# Patient Record
Sex: FEMALE | Race: WHITE | NOT HISPANIC OR LATINO
[De-identification: names, ages, dates, MRNs, and addresses within clinical notes are randomized per-mention and may not be internally consistent; named-entity substitution may affect disease eponyms.]

---

## 2017-09-07 ENCOUNTER — APPOINTMENT (OUTPATIENT)
Dept: PULMONOLOGY | Facility: CLINIC | Age: 82
End: 2017-09-07

## 2017-09-15 ENCOUNTER — EMERGENCY (EMERGENCY)
Facility: HOSPITAL | Age: 82
LOS: 1 days | Discharge: HOME | End: 2017-09-15
Attending: EMERGENCY MEDICINE | Admitting: EMERGENCY MEDICINE
Payer: MEDICARE

## 2017-09-15 VITALS
SYSTOLIC BLOOD PRESSURE: 151 MMHG | DIASTOLIC BLOOD PRESSURE: 73 MMHG | OXYGEN SATURATION: 100 % | HEART RATE: 59 BPM | RESPIRATION RATE: 16 BRPM

## 2017-09-15 VITALS
TEMPERATURE: 98 F | HEART RATE: 78 BPM | DIASTOLIC BLOOD PRESSURE: 80 MMHG | WEIGHT: 128.53 LBS | OXYGEN SATURATION: 99 % | SYSTOLIC BLOOD PRESSURE: 114 MMHG | RESPIRATION RATE: 20 BRPM
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
## 2017-09-15 DIAGNOSIS — M25.511 PAIN IN RIGHT SHOULDER: ICD-10-CM

## 2017-09-15 DIAGNOSIS — S40.021A CONTUSION OF RIGHT UPPER ARM, INITIAL ENCOUNTER: ICD-10-CM
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
## 2017-09-15 DIAGNOSIS — I10 ESSENTIAL (PRIMARY) HYPERTENSION: ICD-10-CM

## 2017-09-15 DIAGNOSIS — N39.0 URINARY TRACT INFECTION, SITE NOT SPECIFIED: ICD-10-CM

## 2017-09-15 DIAGNOSIS — E78.5 HYPERLIPIDEMIA, UNSPECIFIED: ICD-10-CM

## 2017-09-15 LAB
ALBUMIN SERPL ELPH-MCNC: 4.3 G/DL — SIGNIFICANT CHANGE UP (ref 3.3–5)
ALP SERPL-CCNC: 74 U/L — SIGNIFICANT CHANGE UP (ref 40–120)
ALT FLD-CCNC: 15 U/L — SIGNIFICANT CHANGE UP (ref 10–45)
ANION GAP SERPL CALC-SCNC: 13 MMOL/L — SIGNIFICANT CHANGE UP (ref 5–17)
APPEARANCE UR: CLEAR — SIGNIFICANT CHANGE UP
APTT BLD: 30.5 SEC — SIGNIFICANT CHANGE UP (ref 27.5–37.4)
AST SERPL-CCNC: 23 U/L — SIGNIFICANT CHANGE UP (ref 10–40)
BACTERIA # UR AUTO: PRESENT /HPF
BASOPHILS NFR BLD AUTO: 1.5 % — SIGNIFICANT CHANGE UP (ref 0–2)
BILIRUB SERPL-MCNC: 0.4 MG/DL — SIGNIFICANT CHANGE UP (ref 0.2–1.2)
BILIRUB UR-MCNC: NEGATIVE — SIGNIFICANT CHANGE UP
BUN SERPL-MCNC: 24 MG/DL — HIGH (ref 7–23)
CALCIUM SERPL-MCNC: 9.4 MG/DL — SIGNIFICANT CHANGE UP (ref 8.4–10.5)
CHLORIDE SERPL-SCNC: 95 MMOL/L — LOW (ref 96–108)
CK MB CFR SERPL CALC: 1.7 NG/ML — SIGNIFICANT CHANGE UP (ref 0–6.7)
CK SERPL-CCNC: 64 U/L — SIGNIFICANT CHANGE UP (ref 25–170)
CO2 SERPL-SCNC: 28 MMOL/L — SIGNIFICANT CHANGE UP (ref 22–31)
COLOR SPEC: YELLOW — SIGNIFICANT CHANGE UP
CREAT SERPL-MCNC: 0.97 MG/DL — SIGNIFICANT CHANGE UP (ref 0.5–1.3)
DIFF PNL FLD: NEGATIVE — SIGNIFICANT CHANGE UP
EOSINOPHIL NFR BLD AUTO: 5 % — SIGNIFICANT CHANGE UP (ref 0–6)
EPI CELLS # UR: SIGNIFICANT CHANGE UP /HPF
GLUCOSE SERPL-MCNC: 106 MG/DL — HIGH (ref 70–99)
GLUCOSE UR QL: NEGATIVE — SIGNIFICANT CHANGE UP
HCT VFR BLD CALC: 32.9 % — LOW (ref 34.5–45)
HGB BLD-MCNC: 11.2 G/DL — LOW (ref 11.5–15.5)
INR BLD: 1.08 — SIGNIFICANT CHANGE UP (ref 0.88–1.16)
KETONES UR-MCNC: NEGATIVE — SIGNIFICANT CHANGE UP
LEUKOCYTE ESTERASE UR-ACNC: (no result)
LYMPHOCYTES # BLD AUTO: 27.7 % — SIGNIFICANT CHANGE UP (ref 13–44)
MCHC RBC-ENTMCNC: 31.2 PG — SIGNIFICANT CHANGE UP (ref 27–34)
MCHC RBC-ENTMCNC: 34 G/DL — SIGNIFICANT CHANGE UP (ref 32–36)
MCV RBC AUTO: 91.6 FL — SIGNIFICANT CHANGE UP (ref 80–100)
MONOCYTES NFR BLD AUTO: 11.1 % — SIGNIFICANT CHANGE UP (ref 2–14)
NEUTROPHILS NFR BLD AUTO: 54.7 % — SIGNIFICANT CHANGE UP (ref 43–77)
NITRITE UR-MCNC: NEGATIVE — SIGNIFICANT CHANGE UP
PH UR: 6.5 — SIGNIFICANT CHANGE UP (ref 5–8)
PLATELET # BLD AUTO: 247 K/UL — SIGNIFICANT CHANGE UP (ref 150–400)
POTASSIUM SERPL-MCNC: 3.7 MMOL/L — SIGNIFICANT CHANGE UP (ref 3.5–5.3)
POTASSIUM SERPL-SCNC: 3.7 MMOL/L — SIGNIFICANT CHANGE UP (ref 3.5–5.3)
PROT SERPL-MCNC: 7.5 G/DL — SIGNIFICANT CHANGE UP (ref 6–8.3)
PROT UR-MCNC: NEGATIVE MG/DL — SIGNIFICANT CHANGE UP
PROTHROM AB SERPL-ACNC: 12 SEC — SIGNIFICANT CHANGE UP (ref 9.8–12.7)
RBC # BLD: 3.59 M/UL — LOW (ref 3.8–5.2)
RBC # FLD: 14 % — SIGNIFICANT CHANGE UP (ref 10.3–16.9)
RBC CASTS # UR COMP ASSIST: < 5 /HPF — SIGNIFICANT CHANGE UP
SODIUM SERPL-SCNC: 136 MMOL/L — SIGNIFICANT CHANGE UP (ref 135–145)
SP GR SPEC: <=1.005 — SIGNIFICANT CHANGE UP (ref 1–1.03)
TROPONIN T SERPL-MCNC: <0.01 NG/ML — SIGNIFICANT CHANGE UP (ref 0–0.01)
UROBILINOGEN FLD QL: 0.2 E.U./DL — SIGNIFICANT CHANGE UP
WBC # BLD: 4 K/UL — SIGNIFICANT CHANGE UP (ref 3.8–10.5)
WBC # FLD AUTO: 4 K/UL — SIGNIFICANT CHANGE UP (ref 3.8–10.5)
WBC UR QL: (no result) /HPF

## 2017-09-15 PROCEDURE — 80053 COMPREHEN METABOLIC PANEL: CPT

## 2017-09-15 PROCEDURE — 85025 COMPLETE CBC W/AUTO DIFF WBC: CPT

## 2017-09-15 PROCEDURE — 81001 URINALYSIS AUTO W/SCOPE: CPT

## 2017-09-15 PROCEDURE — 93005 ELECTROCARDIOGRAM TRACING: CPT

## 2017-09-15 PROCEDURE — 70450 CT HEAD/BRAIN W/O DYE: CPT | Mod: 26

## 2017-09-15 PROCEDURE — 99285 EMERGENCY DEPT VISIT HI MDM: CPT | Mod: 25

## 2017-09-15 PROCEDURE — 84484 ASSAY OF TROPONIN QUANT: CPT

## 2017-09-15 PROCEDURE — 85610 PROTHROMBIN TIME: CPT

## 2017-09-15 PROCEDURE — 99284 EMERGENCY DEPT VISIT MOD MDM: CPT | Mod: 25

## 2017-09-15 PROCEDURE — 85730 THROMBOPLASTIN TIME PARTIAL: CPT

## 2017-09-15 PROCEDURE — 82553 CREATINE MB FRACTION: CPT

## 2017-09-15 PROCEDURE — 93010 ELECTROCARDIOGRAM REPORT: CPT

## 2017-09-15 PROCEDURE — 82550 ASSAY OF CK (CPK): CPT

## 2017-09-15 PROCEDURE — 70450 CT HEAD/BRAIN W/O DYE: CPT

## 2017-09-15 PROCEDURE — 73030 X-RAY EXAM OF SHOULDER: CPT | Mod: 26,RT

## 2017-09-15 PROCEDURE — 73030 X-RAY EXAM OF SHOULDER: CPT

## 2017-09-15 RX ORDER — CEPHALEXIN 500 MG
1 CAPSULE ORAL
Qty: 14 | Refills: 0 | OUTPATIENT
Start: 2017-09-15 | End: 2017-09-22

## 2017-09-15 NOTE — ED ADULT NURSE NOTE - OBJECTIVE STATEMENT
fell last Saturday and hurt right upper arm ; family states baseline MS - oriented to name and birthday, not year; knows location and family, not date/time went to [place] and sent here for possible stroke from 3 days ago when family states her walking was unsteady - no slurred speech; no pronator drify or weakness; follows commands; converses easily - denies other symptoms

## 2017-09-15 NOTE — ED PROVIDER NOTE - PMH
Cerebral artery occlusion with cerebral infarction  CVA (cerebral infarction)  Essential hypertension  HTN (hypertension)  Fall  Falls  Hyperlipidemia  HLD (hyperlipidemia)  Memory loss  Memory loss

## 2017-09-15 NOTE — ED ADULT NURSE REASSESSMENT NOTE - COMFORT CARE
ambulated to bathroom/repositioned/warm blanket provided/warm packs to PRAVEENA/side rails up/wait time explained

## 2017-09-15 NOTE — ED PROVIDER NOTE - MEDICAL DECISION MAKING DETAILS
arm injury.  suspect occult fall.  xray without fracture.  ct head without acute process.  has 24 hour care per family.  labs wnl.  to f/u with pmd on monday for reassessment.  prn tylenol

## 2017-09-15 NOTE — ED ADULT NURSE NOTE - CHIEF COMPLAINT QUOTE
Patient was brought in for change in mental status  for 3 days , unsteady gait/slow gait since yesterday . Also s/p fall last week at the store , since yesterday c/o rt arm pain . Denies any loc . Was sent from Dr. meyer for r/o CVA . No facial droop nor slurred speech noted .

## 2017-09-15 NOTE — ED ADULT TRIAGE NOTE - CHIEF COMPLAINT QUOTE
Patient was brought in for change in mental status  for 3 days , unsteady gait/slow gait since yesterday . Also s/p fall last week at the store , since yesterday c/o rt arm pain . Denies any loc . Was sent from Dr. meyer for r/o CVA . Patient was brought in for change in mental status  for 3 days , unsteady gait/slow gait since yesterday . Also s/p fall last week at the store , since yesterday c/o rt arm pain . Denies any loc . Was sent from Dr. meyer for r/o CVA . No facial droop nor slurred speech noted .

## 2017-09-15 NOTE — ED PROVIDER NOTE - CARE PLAN
Principal Discharge DX:	Arm contusion, right, initial encounter Principal Discharge DX:	Arm contusion, right, initial encounter  Secondary Diagnosis:	UTI (urinary tract infection)

## 2017-09-15 NOTE — ED PROVIDER NOTE - OBJECTIVE STATEMENT
here with pain in right shoulder for past few days.  Per family, slid off a stool at the store 6 d ago but didn't hit arm.  Pain in arm started a few days after that.  Denies additional fall but patient with memory issues.  Saw pmd today and referred to ED.  No headache, neck pain, fever/chills.

## 2017-11-12 ENCOUNTER — TRANSCRIPTION ENCOUNTER (OUTPATIENT)
Age: 82
End: 2017-11-12

## 2017-12-11 ENCOUNTER — TRANSCRIPTION ENCOUNTER (OUTPATIENT)
Age: 82
End: 2017-12-11

## 2018-01-15 ENCOUNTER — INPATIENT (INPATIENT)
Facility: HOSPITAL | Age: 83
LOS: 1 days | Discharge: HOME | DRG: 312 | End: 2018-01-17
Attending: INTERNAL MEDICINE | Admitting: INTERNAL MEDICINE
Payer: MEDICARE

## 2018-01-15 VITALS
WEIGHT: 164.91 LBS | HEART RATE: 60 BPM | TEMPERATURE: 98 F | SYSTOLIC BLOOD PRESSURE: 122 MMHG | DIASTOLIC BLOOD PRESSURE: 78 MMHG | OXYGEN SATURATION: 97 % | RESPIRATION RATE: 16 BRPM

## 2018-01-15 LAB
ALBUMIN SERPL ELPH-MCNC: 3.7 G/DL — SIGNIFICANT CHANGE UP (ref 3.3–5)
ALP SERPL-CCNC: 67 U/L — SIGNIFICANT CHANGE UP (ref 40–120)
ALT FLD-CCNC: 17 U/L — SIGNIFICANT CHANGE UP (ref 10–45)
ANION GAP SERPL CALC-SCNC: 13 MMOL/L — SIGNIFICANT CHANGE UP (ref 5–17)
APPEARANCE UR: CLEAR — SIGNIFICANT CHANGE UP
APTT BLD: 28 SEC — SIGNIFICANT CHANGE UP (ref 27.5–37.4)
AST SERPL-CCNC: 23 U/L — SIGNIFICANT CHANGE UP (ref 10–40)
BASOPHILS NFR BLD AUTO: 1.2 % — SIGNIFICANT CHANGE UP (ref 0–2)
BILIRUB SERPL-MCNC: 0.2 MG/DL — SIGNIFICANT CHANGE UP (ref 0.2–1.2)
BILIRUB UR-MCNC: NEGATIVE — SIGNIFICANT CHANGE UP
BUN SERPL-MCNC: 22 MG/DL — SIGNIFICANT CHANGE UP (ref 7–23)
CALCIUM SERPL-MCNC: 8.9 MG/DL — SIGNIFICANT CHANGE UP (ref 8.4–10.5)
CHLORIDE SERPL-SCNC: 95 MMOL/L — LOW (ref 96–108)
CK MB CFR SERPL CALC: 1.5 NG/ML — SIGNIFICANT CHANGE UP (ref 0–6.7)
CK SERPL-CCNC: 50 U/L — SIGNIFICANT CHANGE UP (ref 25–170)
CO2 SERPL-SCNC: 26 MMOL/L — SIGNIFICANT CHANGE UP (ref 22–31)
COLOR SPEC: YELLOW — SIGNIFICANT CHANGE UP
CREAT SERPL-MCNC: 1.14 MG/DL — SIGNIFICANT CHANGE UP (ref 0.5–1.3)
DIFF PNL FLD: NEGATIVE — SIGNIFICANT CHANGE UP
EOSINOPHIL NFR BLD AUTO: 7.2 % — HIGH (ref 0–6)
GLUCOSE SERPL-MCNC: 188 MG/DL — HIGH (ref 70–99)
GLUCOSE UR QL: NEGATIVE — SIGNIFICANT CHANGE UP
HCT VFR BLD CALC: 32.2 % — LOW (ref 34.5–45)
HGB BLD-MCNC: 10.7 G/DL — LOW (ref 11.5–15.5)
INR BLD: 1.05 — SIGNIFICANT CHANGE UP (ref 0.88–1.16)
KETONES UR-MCNC: (no result) MG/DL
LEUKOCYTE ESTERASE UR-ACNC: (no result)
LYMPHOCYTES # BLD AUTO: 25.3 % — SIGNIFICANT CHANGE UP (ref 13–44)
MCHC RBC-ENTMCNC: 30.6 PG — SIGNIFICANT CHANGE UP (ref 27–34)
MCHC RBC-ENTMCNC: 33.2 G/DL — SIGNIFICANT CHANGE UP (ref 32–36)
MCV RBC AUTO: 92 FL — SIGNIFICANT CHANGE UP (ref 80–100)
MONOCYTES NFR BLD AUTO: 9.8 % — SIGNIFICANT CHANGE UP (ref 2–14)
NEUTROPHILS NFR BLD AUTO: 56.5 % — SIGNIFICANT CHANGE UP (ref 43–77)
NITRITE UR-MCNC: NEGATIVE — SIGNIFICANT CHANGE UP
PH UR: 6 — SIGNIFICANT CHANGE UP (ref 5–8)
PLATELET # BLD AUTO: 321 K/UL — SIGNIFICANT CHANGE UP (ref 150–400)
POTASSIUM SERPL-MCNC: 3.7 MMOL/L — SIGNIFICANT CHANGE UP (ref 3.5–5.3)
POTASSIUM SERPL-SCNC: 3.7 MMOL/L — SIGNIFICANT CHANGE UP (ref 3.5–5.3)
PROT SERPL-MCNC: 7 G/DL — SIGNIFICANT CHANGE UP (ref 6–8.3)
PROT UR-MCNC: NEGATIVE MG/DL — SIGNIFICANT CHANGE UP
PROTHROM AB SERPL-ACNC: 11.7 SEC — SIGNIFICANT CHANGE UP (ref 9.8–12.7)
RBC # BLD: 3.5 M/UL — LOW (ref 3.8–5.2)
RBC # FLD: 14.2 % — SIGNIFICANT CHANGE UP (ref 10.3–16.9)
SODIUM SERPL-SCNC: 134 MMOL/L — LOW (ref 135–145)
SP GR SPEC: 1.01 — SIGNIFICANT CHANGE UP (ref 1–1.03)
TROPONIN T SERPL-MCNC: <0.01 NG/ML — SIGNIFICANT CHANGE UP (ref 0–0.01)
UROBILINOGEN FLD QL: 0.2 E.U./DL — SIGNIFICANT CHANGE UP
WBC # BLD: 5.7 K/UL — SIGNIFICANT CHANGE UP (ref 3.8–10.5)
WBC # FLD AUTO: 5.7 K/UL — SIGNIFICANT CHANGE UP (ref 3.8–10.5)

## 2018-01-15 PROCEDURE — 93010 ELECTROCARDIOGRAM REPORT: CPT

## 2018-01-15 PROCEDURE — 70450 CT HEAD/BRAIN W/O DYE: CPT | Mod: 26

## 2018-01-15 PROCEDURE — 99223 1ST HOSP IP/OBS HIGH 75: CPT | Mod: AI

## 2018-01-15 PROCEDURE — 99285 EMERGENCY DEPT VISIT HI MDM: CPT | Mod: 25

## 2018-01-15 PROCEDURE — 71045 X-RAY EXAM CHEST 1 VIEW: CPT | Mod: 26

## 2018-01-15 RX ORDER — SODIUM CHLORIDE 9 MG/ML
1000 INJECTION INTRAMUSCULAR; INTRAVENOUS; SUBCUTANEOUS
Qty: 0 | Refills: 0 | Status: DISCONTINUED | OUTPATIENT
Start: 2018-01-15 | End: 2018-01-16

## 2018-01-15 RX ADMIN — SODIUM CHLORIDE 125 MILLILITER(S): 9 INJECTION INTRAMUSCULAR; INTRAVENOUS; SUBCUTANEOUS at 21:53

## 2018-01-15 NOTE — ED ADULT TRIAGE NOTE - CHIEF COMPLAINT QUOTE
per family member  almost passed out while having dinner tonight, was feeling shaky , with headache; finger stick by  per family member  almost passed out while having dinner tonight, was feeling shaky , with headache; finger stick by ; in triage pt states "Im fine"

## 2018-01-15 NOTE — ED ADULT NURSE NOTE - CHIEF COMPLAINT QUOTE
per family member  almost passed out while having dinner tonight, was feeling shaky , with headache; finger stick by ; in triage pt states "Im fine"

## 2018-01-15 NOTE — ED PROVIDER NOTE - OBJECTIVE STATEMENT
89 yo F with hx of PAF -no AC- recent admission in [place] for UTI/ pyelo x 4 days - [hosp was 2 weeks ago]-  she flew back today from [place] was feeling fine --was at a restaurant  with family having dinner suddenly slumped over- was shaking for a few seconds during event - awoke qucikly  no tongue bite or urinary or fecal incontinence  no antecedant chest pain or sob  no N/V no diaphoresis  no cough or fevers or chills- no current dysuria or freq   no headache  no prior hx of CVA or TIA or MI  no cardiac stents  no DM 89 yo F with hx of PAF -no AC- recent admission in [place] for UTI/ pyelo x 4 days - [hosp was 2 weeks ago]-  she flew back today from [place] was feeling fine --was at a restaurant  with family having dinner suddenly slumped over- was shaking for a few seconds during event - awoke quickly  no tongue bite or urinary or fecal incontinence  no antecedant chest pain or sob  no N/V no diaphoresis  no cough or fevers or chills- no current dysuria or freq   no headache  no prior hx of CVA or TIA or MI  no cardiac stents  no DM

## 2018-01-15 NOTE — ED ADULT NURSE NOTE - OBJECTIVE STATEMENT
as per pt family, pt had near syncopal episode while at dinner tonight.  denies fall.  pt is a&ox2 with dementia baseline, does not recall events.  denies pain at this moment. denies headache, dizziness, chest pain, palpitations, n/v.   ekg completed at bedside.

## 2018-01-15 NOTE — ED PROVIDER NOTE - MEDICAL DECISION MAKING DETAILS
89 yo F with syncopal event 1 hr ago  neuro intact  no head trauma  hx of PAF  no AC  currently in SR  eb  56  no ischemic changes

## 2018-01-16 DIAGNOSIS — I10 ESSENTIAL (PRIMARY) HYPERTENSION: ICD-10-CM

## 2018-01-16 DIAGNOSIS — N39.0 URINARY TRACT INFECTION, SITE NOT SPECIFIED: ICD-10-CM

## 2018-01-16 DIAGNOSIS — I35.0 NONRHEUMATIC AORTIC (VALVE) STENOSIS: ICD-10-CM

## 2018-01-16 DIAGNOSIS — F03.90 UNSPECIFIED DEMENTIA WITHOUT BEHAVIORAL DISTURBANCE: ICD-10-CM

## 2018-01-16 DIAGNOSIS — J32.9 CHRONIC SINUSITIS, UNSPECIFIED: ICD-10-CM

## 2018-01-16 DIAGNOSIS — E78.5 HYPERLIPIDEMIA, UNSPECIFIED: ICD-10-CM

## 2018-01-16 DIAGNOSIS — R55 SYNCOPE AND COLLAPSE: ICD-10-CM

## 2018-01-16 DIAGNOSIS — I48.91 UNSPECIFIED ATRIAL FIBRILLATION: ICD-10-CM

## 2018-01-16 DIAGNOSIS — I48.0 PAROXYSMAL ATRIAL FIBRILLATION: ICD-10-CM

## 2018-01-16 LAB
ALBUMIN SERPL ELPH-MCNC: 3.6 G/DL — SIGNIFICANT CHANGE UP (ref 3.3–5)
ALP SERPL-CCNC: 64 U/L — SIGNIFICANT CHANGE UP (ref 40–120)
ALT FLD-CCNC: 17 U/L — SIGNIFICANT CHANGE UP (ref 10–45)
ANION GAP SERPL CALC-SCNC: 9 MMOL/L — SIGNIFICANT CHANGE UP (ref 5–17)
APPEARANCE UR: CLEAR — SIGNIFICANT CHANGE UP
AST SERPL-CCNC: 22 U/L — SIGNIFICANT CHANGE UP (ref 10–40)
BACTERIA # UR AUTO: PRESENT /HPF
BASOPHILS NFR BLD AUTO: 1.1 % — SIGNIFICANT CHANGE UP (ref 0–2)
BILIRUB SERPL-MCNC: 0.3 MG/DL — SIGNIFICANT CHANGE UP (ref 0.2–1.2)
BILIRUB UR-MCNC: NEGATIVE — SIGNIFICANT CHANGE UP
BUN SERPL-MCNC: 19 MG/DL — SIGNIFICANT CHANGE UP (ref 7–23)
CALCIUM SERPL-MCNC: 9.2 MG/DL — SIGNIFICANT CHANGE UP (ref 8.4–10.5)
CHLORIDE SERPL-SCNC: 98 MMOL/L — SIGNIFICANT CHANGE UP (ref 96–108)
CHOLEST SERPL-MCNC: 176 MG/DL — SIGNIFICANT CHANGE UP (ref 10–199)
CK MB CFR SERPL CALC: 1.6 NG/ML — SIGNIFICANT CHANGE UP (ref 0–6.7)
CK SERPL-CCNC: 46 U/L — SIGNIFICANT CHANGE UP (ref 25–170)
CO2 SERPL-SCNC: 28 MMOL/L — SIGNIFICANT CHANGE UP (ref 22–31)
COLOR SPEC: YELLOW — SIGNIFICANT CHANGE UP
COMMENT - URINE: SIGNIFICANT CHANGE UP
CREAT SERPL-MCNC: 0.97 MG/DL — SIGNIFICANT CHANGE UP (ref 0.5–1.3)
DIFF PNL FLD: NEGATIVE — SIGNIFICANT CHANGE UP
EOSINOPHIL NFR BLD AUTO: 6.1 % — HIGH (ref 0–6)
EPI CELLS # UR: (no result) /HPF (ref 0–5)
GLUCOSE SERPL-MCNC: 111 MG/DL — HIGH (ref 70–99)
GLUCOSE UR QL: NEGATIVE — SIGNIFICANT CHANGE UP
HBA1C BLD-MCNC: 5.3 % — SIGNIFICANT CHANGE UP (ref 4–5.6)
HCT VFR BLD CALC: 30.1 % — LOW (ref 34.5–45)
HDLC SERPL-MCNC: 90 MG/DL — SIGNIFICANT CHANGE UP (ref 40–125)
HGB BLD-MCNC: 10.1 G/DL — LOW (ref 11.5–15.5)
KETONES UR-MCNC: NEGATIVE — SIGNIFICANT CHANGE UP
LEUKOCYTE ESTERASE UR-ACNC: (no result)
LIPID PNL WITH DIRECT LDL SERPL: 75 MG/DL — SIGNIFICANT CHANGE UP
LYMPHOCYTES # BLD AUTO: 27.8 % — SIGNIFICANT CHANGE UP (ref 13–44)
MAGNESIUM SERPL-MCNC: 2.1 MG/DL — SIGNIFICANT CHANGE UP (ref 1.6–2.6)
MCHC RBC-ENTMCNC: 30.3 PG — SIGNIFICANT CHANGE UP (ref 27–34)
MCHC RBC-ENTMCNC: 33.6 G/DL — SIGNIFICANT CHANGE UP (ref 32–36)
MCV RBC AUTO: 90.4 FL — SIGNIFICANT CHANGE UP (ref 80–100)
MONOCYTES NFR BLD AUTO: 9.4 % — SIGNIFICANT CHANGE UP (ref 2–14)
NEUTROPHILS NFR BLD AUTO: 55.6 % — SIGNIFICANT CHANGE UP (ref 43–77)
NITRITE UR-MCNC: NEGATIVE — SIGNIFICANT CHANGE UP
NT-PROBNP SERPL-SCNC: 291 PG/ML — SIGNIFICANT CHANGE UP (ref 0–300)
PH UR: 6.5 — SIGNIFICANT CHANGE UP (ref 5–8)
PLATELET # BLD AUTO: 294 K/UL — SIGNIFICANT CHANGE UP (ref 150–400)
POTASSIUM SERPL-MCNC: 3.6 MMOL/L — SIGNIFICANT CHANGE UP (ref 3.5–5.3)
POTASSIUM SERPL-SCNC: 3.6 MMOL/L — SIGNIFICANT CHANGE UP (ref 3.5–5.3)
PROT SERPL-MCNC: 6.8 G/DL — SIGNIFICANT CHANGE UP (ref 6–8.3)
PROT UR-MCNC: NEGATIVE MG/DL — SIGNIFICANT CHANGE UP
RBC # BLD: 3.33 M/UL — LOW (ref 3.8–5.2)
RBC # FLD: 14.2 % — SIGNIFICANT CHANGE UP (ref 10.3–16.9)
RBC CASTS # UR COMP ASSIST: < 5 /HPF — SIGNIFICANT CHANGE UP
SODIUM SERPL-SCNC: 135 MMOL/L — SIGNIFICANT CHANGE UP (ref 135–145)
SP GR SPEC: <=1.005 — SIGNIFICANT CHANGE UP (ref 1–1.03)
TOTAL CHOLESTEROL/HDL RATIO MEASUREMENT: 2 RATIO — LOW (ref 3.3–7.1)
TRIGL SERPL-MCNC: 55 MG/DL — SIGNIFICANT CHANGE UP (ref 10–149)
TROPONIN T SERPL-MCNC: <0.01 NG/ML — SIGNIFICANT CHANGE UP (ref 0–0.01)
TROPONIN T SERPL-MCNC: <0.01 NG/ML — SIGNIFICANT CHANGE UP (ref 0–0.01)
TSH SERPL-MCNC: 1.36 UIU/ML — SIGNIFICANT CHANGE UP (ref 0.35–4.94)
UROBILINOGEN FLD QL: 0.2 E.U./DL — SIGNIFICANT CHANGE UP
WBC # BLD: 5.4 K/UL — SIGNIFICANT CHANGE UP (ref 3.8–10.5)
WBC # FLD AUTO: 5.4 K/UL — SIGNIFICANT CHANGE UP (ref 3.8–10.5)
WBC UR QL: > 10 /HPF

## 2018-01-16 PROCEDURE — 93880 EXTRACRANIAL BILAT STUDY: CPT | Mod: 26

## 2018-01-16 PROCEDURE — 93306 TTE W/DOPPLER COMPLETE: CPT | Mod: 26

## 2018-01-16 PROCEDURE — 99232 SBSQ HOSP IP/OBS MODERATE 35: CPT

## 2018-01-16 PROCEDURE — 93010 ELECTROCARDIOGRAM REPORT: CPT

## 2018-01-16 RX ORDER — LOSARTAN POTASSIUM 100 MG/1
100 TABLET, FILM COATED ORAL DAILY
Qty: 0 | Refills: 0 | Status: DISCONTINUED | OUTPATIENT
Start: 2018-01-16 | End: 2018-01-17

## 2018-01-16 RX ORDER — ASPIRIN/CALCIUM CARB/MAGNESIUM 324 MG
81 TABLET ORAL DAILY
Qty: 0 | Refills: 0 | Status: DISCONTINUED | OUTPATIENT
Start: 2018-01-16 | End: 2018-01-17

## 2018-01-16 RX ORDER — CHOLECALCIFEROL (VITAMIN D3) 125 MCG
1000 CAPSULE ORAL DAILY
Qty: 0 | Refills: 0 | Status: DISCONTINUED | OUTPATIENT
Start: 2018-01-16 | End: 2018-01-17

## 2018-01-16 RX ORDER — HEPARIN SODIUM 5000 [USP'U]/ML
5000 INJECTION INTRAVENOUS; SUBCUTANEOUS EVERY 8 HOURS
Qty: 0 | Refills: 0 | Status: DISCONTINUED | OUTPATIENT
Start: 2018-01-16 | End: 2018-01-17

## 2018-01-16 RX ORDER — ATORVASTATIN CALCIUM 80 MG/1
10 TABLET, FILM COATED ORAL AT BEDTIME
Qty: 0 | Refills: 0 | Status: DISCONTINUED | OUTPATIENT
Start: 2018-01-16 | End: 2018-01-17

## 2018-01-16 RX ORDER — MEMANTINE HYDROCHLORIDE 10 MG/1
5 TABLET ORAL
Qty: 0 | Refills: 0 | Status: DISCONTINUED | OUTPATIENT
Start: 2018-01-16 | End: 2018-01-17

## 2018-01-16 RX ORDER — HYDROCHLOROTHIAZIDE 25 MG
25 TABLET ORAL DAILY
Qty: 0 | Refills: 0 | Status: DISCONTINUED | OUTPATIENT
Start: 2018-01-16 | End: 2018-01-17

## 2018-01-16 RX ORDER — SODIUM CHLORIDE 9 MG/ML
1000 INJECTION INTRAMUSCULAR; INTRAVENOUS; SUBCUTANEOUS
Qty: 0 | Refills: 0 | Status: DISCONTINUED | OUTPATIENT
Start: 2018-01-16 | End: 2018-01-17

## 2018-01-16 RX ORDER — POTASSIUM CHLORIDE 20 MEQ
40 PACKET (EA) ORAL ONCE
Qty: 0 | Refills: 0 | Status: COMPLETED | OUTPATIENT
Start: 2018-01-16 | End: 2018-01-16

## 2018-01-16 RX ORDER — PANTOPRAZOLE SODIUM 20 MG/1
40 TABLET, DELAYED RELEASE ORAL
Qty: 0 | Refills: 0 | Status: DISCONTINUED | OUTPATIENT
Start: 2018-01-16 | End: 2018-01-17

## 2018-01-16 RX ADMIN — MEMANTINE HYDROCHLORIDE 5 MILLIGRAM(S): 10 TABLET ORAL at 17:30

## 2018-01-16 RX ADMIN — SODIUM CHLORIDE 60 MILLILITER(S): 9 INJECTION INTRAMUSCULAR; INTRAVENOUS; SUBCUTANEOUS at 19:38

## 2018-01-16 RX ADMIN — LOSARTAN POTASSIUM 100 MILLIGRAM(S): 100 TABLET, FILM COATED ORAL at 06:40

## 2018-01-16 RX ADMIN — HEPARIN SODIUM 5000 UNIT(S): 5000 INJECTION INTRAVENOUS; SUBCUTANEOUS at 22:02

## 2018-01-16 RX ADMIN — Medication 25 MILLIGRAM(S): at 07:51

## 2018-01-16 RX ADMIN — MEMANTINE HYDROCHLORIDE 5 MILLIGRAM(S): 10 TABLET ORAL at 06:40

## 2018-01-16 RX ADMIN — Medication 1000 UNIT(S): at 14:56

## 2018-01-16 RX ADMIN — Medication 81 MILLIGRAM(S): at 11:36

## 2018-01-16 RX ADMIN — HEPARIN SODIUM 5000 UNIT(S): 5000 INJECTION INTRAVENOUS; SUBCUTANEOUS at 06:49

## 2018-01-16 RX ADMIN — Medication 40 MILLIEQUIVALENT(S): at 11:36

## 2018-01-16 RX ADMIN — ATORVASTATIN CALCIUM 10 MILLIGRAM(S): 80 TABLET, FILM COATED ORAL at 22:02

## 2018-01-16 RX ADMIN — HEPARIN SODIUM 5000 UNIT(S): 5000 INJECTION INTRAVENOUS; SUBCUTANEOUS at 14:56

## 2018-01-16 NOTE — PHYSICAL THERAPY INITIAL EVALUATION ADULT - ACTIVE RANGE OF MOTION EXAMINATION, REHAB EVAL
bilateral upper extremity Active ROM was WFL (within functional limits)/Tested throughout functional mobility/bilateral  lower extremity Active ROM was WFL (within functional limits)

## 2018-01-16 NOTE — H&P ADULT - NSHPREVIEWOFSYSTEMS_GEN_ALL_CORE
GENERAL, CONSTITUTIONAL : denies recent weight loss, fever, chills  EYES, VISION: denies changes in vision   EARS, NOSE, THROAT: denies hearing loss  HEART, CARDIOVASCULAR: denies chest pain, arrhythmia, palpitations  RESPIRATORY: Productive cough with greenish phlegm, denies SOB, wheezing, PND, orthopnea  GASTROINTESTINAL: Denies abdominal pain, heartburn, bloody stool, dark tarry stool  GENITOURINARY: Denies frequent urination, urgency; pt. was recently treated with UTI in [place] two weeks ago with successful completion of ABx  MUSCULOSKELETAL admits joint pain or swelling, restricted motion, musculoskeletal pain.  Pt. has arthritis  SKIN & INTEGUMENTARY Denies rashes, sores, blisters, blisters, growths.  NEUROLOGICAL: Denies numbness or tingling sensations, sensation loss, burning.  Pt. has early dementia  PSYCHIATRIC: Denies nervousness, anxiety, depression  ENDOCRINE Denies heat or cold intolerance, excessive thirst  HEMATOLOGIC/LYMPHATIC: Denies abnormal bleeding, bleeding of any kind

## 2018-01-16 NOTE — PHYSICAL THERAPY INITIAL EVALUATION ADULT - ADDITIONAL COMMENTS
Spoke to patient's daughter in law 'Jesika' who reported that patient lives alone with a 24 hour HHA. Patient lives in an apartment with no stairs to enter and an elevator.

## 2018-01-16 NOTE — H&P ADULT - PROBLEM SELECTOR PLAN 2
Pt. was recently hospitalized and treated for UTI/Pyelo with IV and sent home with oral.  Pt. recently finished coarse of ABx.  F/U repeat UA and Urine cx.  Pt. afebrile with normal WBC.

## 2018-01-16 NOTE — H&P ADULT - REASON FOR ADMISSION
Witness Syncope at restaurant after dinner this evening.  Pt. was recently treated as inpatient in [place] two weeks ago for UTI and Sinusitis w/ ABx.

## 2018-01-16 NOTE — PROGRESS NOTE ADULT - SUBJECTIVE AND OBJECTIVE BOX
Interventional Cardiology PA Adult Progress Note    Subjective Assessment: seen and examined at bedside, A&Ox1 to self, and recognizes family members. Denies any complaints- cp, sob, n/v/d, fever, chills, syncope, LE edema  	  MEDICATIONS:  hydrochlorothiazide 25 milliGRAM(s) Oral daily  losartan 100 milliGRAM(s) Oral daily  memantine 5 milliGRAM(s) Oral two times a day  atorvastatin 10 milliGRAM(s) Oral at bedtime  aspirin enteric coated 81 milliGRAM(s) Oral daily  cholecalciferol 1000 Unit(s) Oral daily  heparin  Injectable 5000 Unit(s) SubCutaneous every 8 hours	    [PHYSICAL EXAM:  TELEMETRY:  T(C): 35.9 (01-16-18 @ 09:35), Max: 36.6 (01-15-18 @ 20:59)  HR: 63 (01-16-18 @ 12:39) (59 - 64)  BP: 133/62 (01-16-18 @ 12:39) (122/78 - 139/67)  RR: 16 (01-16-18 @ 12:39) (16 - 16)  SpO2: 96% (01-16-18 @ 12:39) (95% - 97%)  Wt(kg): --  I&O's Summary    15 Brandon 2018 07:01  -  16 Jan 2018 07:00  --------------------------------------------------------  IN: 60 mL / OUT: 600 mL / NET: -540 mL    16 Jan 2018 07:01  -  16 Jan 2018 14:05  --------------------------------------------------------  IN: 30 mL / OUT: 400 mL / NET: -370 mL      Height (cm): 157.5 (01-16 @ 00:47)  Weight (kg): 60 (01-16 @ 00:47)  BMI (kg/m2): 24.2 (01-16 @ 00:47)  BSA (m2): 1.6 (01-16 @ 00:47)  [place]:  Central/PICC/Mid Line:                                         Appearance: Normal	  HEENT:   Normal oral mucosa, PERRL, EOMI	  Neck: Supple,  - JVD; Carotid Bruit   Cardiovascular: Normal S1 S2, No JVD, II/VI systolic murmur  Respiratory: Lungs clear to auscultation/No Rales, Rhonchi, Wheezing	  Gastrointestinal:  Soft, Non-tender  Skin: No rashes, No ecchymoses, No cyanosis  Extremities: Normal range of motion, No clubbing, cyanosis or edema  Vascular: Peripheral pulses palpable 2+ bilaterally  Neurologic: Non-focal  Psychiatry: A & O x 1  	    ECG:  	  RADIOLOGY:   DIAGNOSTIC TESTING:  [ ] Echocardiogram:   [ ]  Catheterization:  [ ] Stress Test:    [ ] LOPEZ  OTHER: 	    LABS:	 	  CARDIAC MARKERS:                        10.1   5.4   )-----------( 294      ( 16 Jan 2018 07:20 )             30.1     01-16    135  |  98  |  19  ----------------------------<  111<H>  3.6   |  28  |  0.97    Ca    9.2      16 Jan 2018 07:20  Mg     2.1     01-16    TPro  6.8  /  Alb  3.6  /  TBili  0.3  /  DBili  <0.2  /  AST  22  /  ALT  17  /  AlkPhos  64  01-16    proBNP: Serum Pro-Brain Natriuretic Peptide: 291 pg/mL (01-16 @ 07:20)    Lipid Profile:   HgA1c: Hemoglobin A1C, Whole Blood: 5.3 % (01-16 @ 07:20)  TSH: Thyroid Stimulating Hormone, Serum: 1.358 uIU/mL (01-16 @ 07:20)  PT/INR - ( 15 Brandon 2018 21:34 )   PT: 11.7 sec;   INR: 1.05     PTT - ( 15 Brandon 2018 21:34 )  PTT:28.0 sec    ASSESSMENT/PLAN: 	        DVT ppx:  Dispo:

## 2018-01-16 NOTE — PROGRESS NOTE ADULT - PROBLEM SELECTOR PLAN 1
- no events on tele o/n, no pauses or bradycardia  - CT head no acute IC findings, sm. air fluid level L maxillary sinus may represent acute sinusitis   - Continue telemetry overnight  - Echocardiogram ordered to evaluate severity of AS  - Carotid U/S  - light hydration  - Dr. Barahona to see patient in AM

## 2018-01-16 NOTE — PHYSICAL THERAPY INITIAL EVALUATION ADULT - CRITERIA FOR SKILLED THERAPEUTIC INTERVENTIONS
rehab potential/predicted duration of therapy intervention/functional limitations in following categories/risk reduction/prevention/impairments found/therapy frequency impairments found/therapy frequency/anticipated equipment needs at discharge/anticipated discharge recommendation/functional limitations in following categories/predicted duration of therapy intervention/risk reduction/prevention/rehab potential

## 2018-01-16 NOTE — H&P ADULT - PROBLEM SELECTOR PLAN 7
Pt. not on AC and currently in SR.  According to patient's son she had new onset Afib while hospitalized two weeks ago in [place] for UTI and Sinusitis.

## 2018-01-16 NOTE — PROGRESS NOTE ADULT - ASSESSMENT
89 yo active female PMHx HTN, HLD, dementia (A&Ox1), h/o cerebral artery occlusion, AS, pafib (during UTI tx in hospital last mo), BIBA for witnessed syncopal episode while out to dinner with family admitted for further work up.

## 2018-01-16 NOTE — H&P ADULT - ASSESSMENT
89 y/o active female (lives alone w/24hour home health aid) w/ PMHx of HTN, HPL, Dementia, hx Cerebral Artery Occlusion, AS, pAfib (no AC, pt. new onset two weeks ago 2/2 to UTI/Pyelo in [place]; currently SR) and hx of recent hospitalization in [place], [place] for UTI/Pyelo and Sinusitis X 4days (tx with Abx) two weeks ago while on vacation with family, BIBA to [facility] ER this evening, 1/15/17, after witness syncope at restaurant with family, no prodrome symptoms reported. Pt. admitted to [facility] for telemetry, ECG, serial CE, Echocardiogram, repeat UA and urine Cx,  TSH and syncope workup.  Discuss plan with Dr. Stalh in AM.

## 2018-01-16 NOTE — PROGRESS NOTE ADULT - PROBLEM SELECTOR PLAN 2
- h/o recent UTI in [place] (2 wks ago) treated with IV abx and concurrent sinusitis  - UA in ED contaminated, UA repeated reveals trace Leuks, otherwise negative, follow up urine culture  - CT head showed evidence of sinusitis, remains asymptomatic, afebrile

## 2018-01-16 NOTE — H&P ADULT - NSHPPHYSICALEXAM_GEN_ALL_CORE
Temp 98.6F, HR 70bpm, /80, RR 16, O2 sat 98% RA, Weight 200lbs, Height 5' 6"    T(C): 36.1 (01-16-18 @ 00:30), Max: 36.6 (01-15-18 @ 20:59)  HR: 64 (01-15-18 @ 23:06) (60 - 64)  BP: 130/68 (01-15-18 @ 23:06) (122/78 - 130/68)  RR: 16 (01-15-18 @ 23:06) (16 - 16)  SpO2: 97% (01-15-18 @ 23:06) (97% - 97%)  Wt(kg): --    Appearance: Normal	  HEENT:   Normal oral mucosa, PERRL, EOMI	  Neck: Supple,  - JVD; No Carotid Bruit and 2+ pulses B/L  Cardiovascular: Normal S1 S2,  2/6 systolic murmur 2ICS no radiation to carotids  Respiratory: Lungs clear to auscultation with mild left lower lobe crackles.  Gastrointestinal:  Soft, Non-tender, + BS	  Skin: No rashes, No ecchymoses, No cyanosis  Extremities: Normal range of motion, No clubbing, cyanosis  mild non pitting edema left foot.  Most likely due to tight socks  Vascular:  b/l without bruit, DP 1+ b/l, PT 1+ b/l  Neurologic: Non-focal  Psychiatry: A & O x 3, Mood & affect appropriate.  Pt. is with early dementia

## 2018-01-16 NOTE — PROGRESS NOTE ADULT - PROBLEM SELECTOR PLAN 5
- Memantine 5 mg orally BID, Galantamine 24 mg orally daily  - A&Ox1-2 .. recognizes/ speaks to family   - PT consult .. patient is very active  - HHA 24 hrs/day, private    GI PPX: protonix  DVT PPX: Hep 5000 u SC q8h  DISPO: PT consult, likely d/c in AM if echo, carotid US, tele NL overnight

## 2018-01-16 NOTE — H&P ADULT - PROBLEM SELECTOR PLAN 8
Pt. very active.  She recently stopped driving two years ago.  Pt. has 24 hour home health aid at home.  Currently takes Namenda 5mg PO bid and Galantamine 24mg PO daily.

## 2018-01-16 NOTE — H&P ADULT - NSHPSOCIALHISTORY_GEN_ALL_CORE
; lives alone with 24 hour home care.  Denies Tobacco, illicit drug and EtOH use.  Family very involved.

## 2018-01-16 NOTE — H&P ADULT - PMH
Atrial fibrillation  Pt. had new onset of Afib two weeks ago while hospitalized for UTI and Sinusitis.  No AC.  Pt. currently SR  Cerebral artery occlusion with cerebral infarction  CVA (cerebral infarction)  Essential hypertension  HTN (hypertension)  Fall  Falls  Hyperlipidemia  HLD (hyperlipidemia)  Memory loss  Memory loss  Sinusitis    UTI (urinary tract infection)  Pt. was recently hospitalized in [place] two weeks ago tx with ABx AS (aortic stenosis)    Atrial fibrillation  Pt. had new onset of Afib two weeks ago while hospitalized for UTI and Sinusitis.  No AC.  Pt. currently SR  Cerebral artery occlusion with cerebral infarction  CVA (cerebral infarction)  Essential hypertension  HTN (hypertension)  Fall  Falls  Hyperlipidemia  HLD (hyperlipidemia)  Memory loss  Memory loss  Sinusitis    UTI (urinary tract infection)  Pt. was recently hospitalized in [place] two weeks ago tx with ABx

## 2018-01-16 NOTE — PROGRESS NOTE ADULT - PROBLEM SELECTOR PLAN 3
- h/o paroxysmal afib new onset 2 wks ago in setting of UTI/sinusitis  - CHADSVASC 5.. however fall risk given dementia so no AC ordered. No official ECG documenting afib (hospital in [place]) and no further episodes on tele.   - continue telemetry, ASA 81 - h/o paroxysmal afib new onset 2 wks ago in setting of UTI/sinusitis  - CHADSVASC 5.. however fall risk given dementia so no AC indicated at this time. ASA 81. No official ECG documenting afib (hospital in [place]) and no further episodes on tele.   - continue telemetry

## 2018-01-17 ENCOUNTER — TRANSCRIPTION ENCOUNTER (OUTPATIENT)
Age: 83
End: 2018-01-17

## 2018-01-17 VITALS — TEMPERATURE: 97 F

## 2018-01-17 LAB
ANION GAP SERPL CALC-SCNC: 9 MMOL/L — SIGNIFICANT CHANGE UP (ref 5–17)
BUN SERPL-MCNC: 19 MG/DL — SIGNIFICANT CHANGE UP (ref 7–23)
CALCIUM SERPL-MCNC: 9.5 MG/DL — SIGNIFICANT CHANGE UP (ref 8.4–10.5)
CHLORIDE SERPL-SCNC: 100 MMOL/L — SIGNIFICANT CHANGE UP (ref 96–108)
CO2 SERPL-SCNC: 29 MMOL/L — SIGNIFICANT CHANGE UP (ref 22–31)
CREAT SERPL-MCNC: 0.99 MG/DL — SIGNIFICANT CHANGE UP (ref 0.5–1.3)
CULTURE RESULTS: SIGNIFICANT CHANGE UP
CULTURE RESULTS: SIGNIFICANT CHANGE UP
GLUCOSE SERPL-MCNC: 89 MG/DL — SIGNIFICANT CHANGE UP (ref 70–99)
HCT VFR BLD CALC: 34 % — LOW (ref 34.5–45)
HGB BLD-MCNC: 11.6 G/DL — SIGNIFICANT CHANGE UP (ref 11.5–15.5)
MAGNESIUM SERPL-MCNC: 2 MG/DL — SIGNIFICANT CHANGE UP (ref 1.6–2.6)
MCHC RBC-ENTMCNC: 31 PG — SIGNIFICANT CHANGE UP (ref 27–34)
MCHC RBC-ENTMCNC: 34.1 G/DL — SIGNIFICANT CHANGE UP (ref 32–36)
MCV RBC AUTO: 90.9 FL — SIGNIFICANT CHANGE UP (ref 80–100)
PLATELET # BLD AUTO: 333 K/UL — SIGNIFICANT CHANGE UP (ref 150–400)
POTASSIUM SERPL-MCNC: 4.3 MMOL/L — SIGNIFICANT CHANGE UP (ref 3.5–5.3)
POTASSIUM SERPL-SCNC: 4.3 MMOL/L — SIGNIFICANT CHANGE UP (ref 3.5–5.3)
RBC # BLD: 3.74 M/UL — LOW (ref 3.8–5.2)
RBC # FLD: 14.5 % — SIGNIFICANT CHANGE UP (ref 10.3–16.9)
SODIUM SERPL-SCNC: 138 MMOL/L — SIGNIFICANT CHANGE UP (ref 135–145)
SPECIMEN SOURCE: SIGNIFICANT CHANGE UP
SPECIMEN SOURCE: SIGNIFICANT CHANGE UP
WBC # BLD: 6.9 K/UL — SIGNIFICANT CHANGE UP (ref 3.8–10.5)
WBC # FLD AUTO: 6.9 K/UL — SIGNIFICANT CHANGE UP (ref 3.8–10.5)

## 2018-01-17 PROCEDURE — 84484 ASSAY OF TROPONIN QUANT: CPT

## 2018-01-17 PROCEDURE — 93306 TTE W/DOPPLER COMPLETE: CPT

## 2018-01-17 PROCEDURE — 80061 LIPID PANEL: CPT

## 2018-01-17 PROCEDURE — 85027 COMPLETE CBC AUTOMATED: CPT

## 2018-01-17 PROCEDURE — 71045 X-RAY EXAM CHEST 1 VIEW: CPT

## 2018-01-17 PROCEDURE — 99238 HOSP IP/OBS DSCHRG MGMT 30/<: CPT

## 2018-01-17 PROCEDURE — 82248 BILIRUBIN DIRECT: CPT

## 2018-01-17 PROCEDURE — 85730 THROMBOPLASTIN TIME PARTIAL: CPT

## 2018-01-17 PROCEDURE — 83735 ASSAY OF MAGNESIUM: CPT

## 2018-01-17 PROCEDURE — 84443 ASSAY THYROID STIM HORMONE: CPT

## 2018-01-17 PROCEDURE — 93005 ELECTROCARDIOGRAM TRACING: CPT

## 2018-01-17 PROCEDURE — 70450 CT HEAD/BRAIN W/O DYE: CPT

## 2018-01-17 PROCEDURE — 83880 ASSAY OF NATRIURETIC PEPTIDE: CPT

## 2018-01-17 PROCEDURE — 93880 EXTRACRANIAL BILAT STUDY: CPT

## 2018-01-17 PROCEDURE — 82553 CREATINE MB FRACTION: CPT

## 2018-01-17 PROCEDURE — 83036 HEMOGLOBIN GLYCOSYLATED A1C: CPT

## 2018-01-17 PROCEDURE — 99285 EMERGENCY DEPT VISIT HI MDM: CPT | Mod: 25

## 2018-01-17 PROCEDURE — 80053 COMPREHEN METABOLIC PANEL: CPT

## 2018-01-17 PROCEDURE — 85025 COMPLETE CBC W/AUTO DIFF WBC: CPT

## 2018-01-17 PROCEDURE — 87086 URINE CULTURE/COLONY COUNT: CPT

## 2018-01-17 PROCEDURE — 36415 COLL VENOUS BLD VENIPUNCTURE: CPT

## 2018-01-17 PROCEDURE — 81001 URINALYSIS AUTO W/SCOPE: CPT

## 2018-01-17 PROCEDURE — 82550 ASSAY OF CK (CPK): CPT

## 2018-01-17 PROCEDURE — 80048 BASIC METABOLIC PNL TOTAL CA: CPT

## 2018-01-17 PROCEDURE — 97162 PT EVAL MOD COMPLEX 30 MIN: CPT

## 2018-01-17 PROCEDURE — 85610 PROTHROMBIN TIME: CPT

## 2018-01-17 RX ORDER — ATORVASTATIN CALCIUM 80 MG/1
1 TABLET, FILM COATED ORAL
Qty: 30 | Refills: 3
Start: 2018-01-17 | End: 2018-05-16

## 2018-01-17 RX ORDER — FLUTICASONE PROPIONATE 50 MCG
1 SPRAY, SUSPENSION NASAL
Qty: 1 | Refills: 0 | OUTPATIENT
Start: 2018-01-17 | End: 2018-02-15

## 2018-01-17 RX ORDER — ATORVASTATIN CALCIUM 80 MG/1
1 TABLET, FILM COATED ORAL
Qty: 0 | Refills: 0 | COMMUNITY

## 2018-01-17 RX ORDER — LOSARTAN/HYDROCHLOROTHIAZIDE 100MG-25MG
1 TABLET ORAL
Qty: 30 | Refills: 3 | OUTPATIENT
Start: 2018-01-17 | End: 2018-05-16

## 2018-01-17 RX ORDER — ASPIRIN/CALCIUM CARB/MAGNESIUM 324 MG
1 TABLET ORAL
Qty: 0 | Refills: 0 | COMMUNITY

## 2018-01-17 RX ORDER — ASPIRIN/CALCIUM CARB/MAGNESIUM 324 MG
1 TABLET ORAL
Qty: 30 | Refills: 3 | OUTPATIENT
Start: 2018-01-17 | End: 2018-05-16

## 2018-01-17 RX ORDER — LOSARTAN/HYDROCHLOROTHIAZIDE 100MG-25MG
1 TABLET ORAL
Qty: 0 | Refills: 0 | COMMUNITY

## 2018-01-17 RX ADMIN — Medication 25 MILLIGRAM(S): at 06:08

## 2018-01-17 RX ADMIN — LOSARTAN POTASSIUM 100 MILLIGRAM(S): 100 TABLET, FILM COATED ORAL at 06:08

## 2018-01-17 RX ADMIN — PANTOPRAZOLE SODIUM 40 MILLIGRAM(S): 20 TABLET, DELAYED RELEASE ORAL at 06:07

## 2018-01-17 RX ADMIN — MEMANTINE HYDROCHLORIDE 5 MILLIGRAM(S): 10 TABLET ORAL at 06:10

## 2018-01-17 NOTE — DISCHARGE NOTE ADULT - CARE PROVIDERS DIRECT ADDRESSES
,DirectAddress_Unknown,[email or web address]

## 2018-01-17 NOTE — DISCHARGE NOTE ADULT - CARE PROVIDER_API CALL
Rah Barahona), Internal Medicine  [address] [address]  Phone: [phone]  Fax: [phone]    Mary Ann Stahl), Internal Medicine  [address] [id number]  Phone: [phone]  Fax: [phone]

## 2018-01-17 NOTE — PROGRESS NOTE ADULT - SUBJECTIVE AND OBJECTIVE BOX
I examined the patient today, reviewed the lab data, xrays and additional studies. Additionally I have reviewed the notes and assessments by the residents and consultants.   At this point I agree with the assessments and plan.  I would also advise close observation, and supportive care.  Follow up all problems

## 2018-01-17 NOTE — DISCHARGE NOTE ADULT - PATIENT PORTAL LINK FT
“You can access the [facility] Patient Portal, offered by [facility], by registering with the following website: [email or web address]”

## 2018-01-17 NOTE — DISCHARGE NOTE ADULT - ADDITIONAL INSTRUCTIONS
Please make sure that you return to the hospital if your symptoms worsen including not limited to chest pain, shortness of breath, lightheadedness, passing out. Please also make sure that you follow up with Dr. Barahona and Dr. Stahl cardiologist. Please make sure to stay hydrated and eat frequently throughout the day.

## 2018-01-17 NOTE — DISCHARGE NOTE ADULT - HOSPITAL COURSE
89 y/o active female (lives alone w/24hour home health aid) w/ PMHx of HTN, HPL, Dementia, hx Cerebral Artery Occlusion, AS, pAfib (no AC, pt. new onset two weeks ago 2/2 to UTI/Pyelo in [place]; currently SR) and hx of recent hospitalization in [place], [place] for UTI/Pyelo and Sinusitis X 4days (tx with Abx) two weeks ago while on vacation with family, BIBA to [facility] ER this evening, 1/15/17, after witness syncope at restaurant with family, no prodrome symptoms reported.  Pt. and her family flew back today from [place] feeling fine until this evening while having dinner at restaurant.  According to patient's son, they were eating desert when his mother slumped over in chair, staring with mild shaking of hands, pt. was unresponsive for approximately one minute.    Pt. denies urinary or bowel incontinence, AMS afterwards.   While on vacation in [place] this month pt. developed complicated UTI with Sinusitis requiring hospitalization for treatment with Abx two weeks ago.  Pt. was hospitalized for four days w/ IV abx  and discharged with oral Abx in which pt. recently finished.   Pt. son reports pt.'s Cardiologist, who is affiliated with  [facility], told his mother she has AS, and has been monitoring her carefully. Pt. denies fever, chills, cp, SOB, abdominal discomfort and N/V.  In ER ECG reveals Sinus Heri @59bpm with non specific ST-T wave changes, CE negative X1, UA reveals UTI (repeat ordered), WBC normal, Blood Glucose 188, CXR no acute pathology seen, CTA of head w/o contrast reveals no acute hemorrhage or ischemic changes; sinusitis is seen.  Fluids were started in ER (NS 1 Liter run @75cc hour; stopped once pt. admitted to floor).  Pt. admitted to [facility] for telemetry, ECG, serial CE, Echocardiogram, repeat UA and urine Cx,  TSH and syncope workup.  Discuss plan with Dr. Stahl in AM.     Upon admission patient had no events on tele, r/o ACS, ECG nonischemic, remained asymptomatic, and underwent echocardiogram 1/16 revealing Normal left ventricular size and wall thickness.The left ventricular wall motion is normal. The left ventricular ejection fraction is estimated to be 60-65%The left atrial size is normal. Right atrial size is normal. The right ventricle is mildly dilated. Structurally normal mitral valve. No mitral regurgitation noted. Structurally normal tricuspid valve. There is no echocardiographic evidence for pulmonary hypertension. Structurally normal pulmonic valve There is trace pulmonic regurgitation. No aortic root dilatation. The inferior vena cava is normal in size (<2.1 cm) with normal inspiratory collapse (>50%) consistent with normal right atrial pressure. There is no pericardial effusion.    Underwent carotid US revealing  UA was repeated which was negative for UTI and pt asymptomatic. UCx sent Dr. Barahona can f/u as outpatient. Remained afebrile WBC NL.   Was hydrated overnight 60 cc/8hrs. Patient ambulated with family and PT.   This AM 1/17 patient's labs WNL, lytes WNL, VSS, asymptomatic, denies cp, sob, meek, palpitations, lightheadedness, syncope. She has HHA 24 h / day private and will be reinstated by family. Patient seen and examined by Dr. Barahona and [facility] and deemed s table for d/c with follow up. Patient has h/o of pafib in [place] 2 wks ago during UTI-- no AC per Bhusri as pt is advanced age/dementia and fall risk. On ASA 81. Instructed to return if symptoms worsen including not limited to CP, SOB, MEEK, palpitations, LOC, syncope. Extensive conversations had with family about increasing PO intake and hydration. 89 y/o active female (lives alone w/24hour home health aid) w/ PMHx of HTN, HPL, Dementia, hx Cerebral Artery Occlusion, AS, pAfib (no AC, pt. new onset two weeks ago 2/2 to UTI/Pyelo in [place]; currently SR) and hx of recent hospitalization in [place], [place] for UTI/Pyelo and Sinusitis X 4days (tx with Abx) two weeks ago while on vacation with family, BIBA to [facility] ER this evening, 1/15/17, after witness syncope at restaurant with family, no prodrome symptoms reported.  Pt. and her family flew back today from [place] feeling fine until this evening while having dinner at restaurant.  According to patient's son, they were eating desert when his mother slumped over in chair, staring with mild shaking of hands, pt. was unresponsive for approximately one minute.    Pt. denies urinary or bowel incontinence, AMS afterwards.   While on vacation in [place] this month pt. developed complicated UTI with Sinusitis requiring hospitalization for treatment with Abx two weeks ago.  Pt. was hospitalized for four days w/ IV abx  and discharged with oral Abx in which pt. recently finished.   Pt. son reports pt.'s Cardiologist, who is affiliated with  [facility], told his mother she has AS, and has been monitoring her carefully. Pt. denies fever, chills, cp, SOB, abdominal discomfort and N/V.  In ER ECG reveals Sinus Heri @59bpm with non specific ST-T wave changes, CE negative X1, UA reveals UTI (repeat ordered), WBC normal, Blood Glucose 188, CXR no acute pathology seen, CTA of head w/o contrast reveals no acute hemorrhage or ischemic changes; sinusitis is seen.  Fluids were started in ER (NS 1 Liter run @75cc hour; stopped once pt. admitted to floor).  Pt. admitted to [facility] for telemetry, ECG, serial CE, Echocardiogram, repeat UA and urine Cx,  TSH and syncope workup.  Discuss plan with Dr. Stahl in AM.     Upon admission patient had no events on tele, r/o ACS, ECG nonischemic, remained asymptomatic, and underwent echocardiogram 1/16 revealing Normal left ventricular size and wall thickness.The left ventricular wall motion is normal. The left ventricular ejection fraction is estimated to be 60-65%The left atrial size is normal. Right atrial size is normal. The right ventricle is mildly dilated. Structurally normal mitral valve. No mitral regurgitation noted. Structurally normal tricuspid valve. There is no echocardiographic evidence for pulmonary hypertension. Structurally normal pulmonic valve There is trace pulmonic regurgitation. No aortic root dilatation. The inferior vena cava is normal in size (<2.1 cm) with normal inspiratory collapse (>50%) consistent with normal right atrial pressure. There is no pericardial effusion.    Underwent carotid US revealing < 50% plaque b/l carotid arteries as verbally read by radiologist.   UA was repeated which was negative for UTI and pt asymptomatic. UCx sent Dr. Barahona can f/u as outpatient-- insignificant amt of growth. Remained afebrile WBC NL.   Was hydrated overnight 60 cc/8hrs. Patient ambulated with family and PT.   This AM 1/17 patient's labs WNL, lytes WNL, VSS, asymptomatic, denies cp, sob, meek, palpitations, lightheadedness, syncope. She has HHA 24 h / day private and will be reinstated by family. Patient seen and examined by Dr. Barahona and Maribeth and deemed s table for d/c with follow up. Patient has h/o of pafib in [place] 2 wks ago during UTI-- no AC per Maribeth as pt is advanced age/dementia and fall risk. On ASA 81. Instructed to return if symptoms worsen including not limited to CP, SOB, MEEK, palpitations, LOC, syncope. Extensive conversations had with family about increasing PO intake and hydration. 89 y/o active female (lives alone w/24hour home health aid) w/ PMHx of HTN, HPL, Dementia, hx Cerebral Artery Occlusion, AS, pAfib (no AC, pt. new onset two weeks ago 2/2 to UTI/Pyelo in [place]; currently SR) and hx of recent hospitalization in [place], [place] for UTI/Pyelo and Sinusitis X 4days (tx with Abx) two weeks ago while on vacation with family, BIBA to [facility] ER this evening, 1/15/17, after witness syncope at restaurant with family, no prodrome symptoms reported.  Pt. and her family flew back today from [place] feeling fine until this evening while having dinner at restaurant.  According to patient's son, they were eating desert when his mother slumped over in chair, staring with mild shaking of hands, pt. was unresponsive for approximately one minute.    Pt. denies urinary or bowel incontinence, AMS afterwards.   While on vacation in [place] this month pt. developed complicated UTI with Sinusitis requiring hospitalization for treatment with Abx two weeks ago.  Pt. was hospitalized for four days w/ IV abx  and discharged with oral Abx in which pt. recently finished.   Pt. son reports pt.'s Cardiologist, who is affiliated with  [facility], told his mother she has AS, and has been monitoring her carefully. Pt. denies fever, chills, cp, SOB, abdominal discomfort and N/V.  In ER ECG reveals Sinus Heri @59bpm with non specific ST-T wave changes, CE negative X1, UA reveals UTI (repeat ordered), WBC normal, Blood Glucose 188, CXR no acute pathology seen, CTA of head w/o contrast reveals no acute hemorrhage or ischemic changes; sinusitis is seen.  Fluids were started in ER (NS 1 Liter run @75cc hour; stopped once pt. admitted to floor).  Pt. admitted to [facility] for telemetry, ECG, serial CE, Echocardiogram, repeat UA and urine Cx,  TSH and syncope workup.  Discuss plan with Dr. Stahl in AM.     Upon admission patient had no events on tele, r/o ACS, ECG nonischemic, remained asymptomatic, and underwent echocardiogram 1/16 revealing Normal left ventricular size and wall thickness.The left ventricular wall motion is normal. The left ventricular ejection fraction is estimated to be 60-65%The left atrial size is normal. Right atrial size is normal. The right ventricle is mildly dilated. Structurally normal mitral valve. No mitral regurgitation noted. Structurally normal tricuspid valve. There is no echocardiographic evidence for pulmonary hypertension. Structurally normal pulmonic valve There is trace pulmonic regurgitation. No aortic root dilatation. The inferior vena cava is normal in size (<2.1 cm) with normal inspiratory collapse (>50%) consistent with normal right atrial pressure. There is no pericardial effusion.    Underwent carotid US revealing < 50% plaque b/l carotid arteries as verbally read by radiologist.   UA was repeated which was negative for UTI and pt asymptomatic. UCx sent Dr. Barahona can f/u as outpatient-- insignificant amt of growth. Remained afebrile WBC NL.   Was hydrated overnight 60 cc/8hrs. Patient ambulated with family and PT.   This AM 1/17 patient's labs WNL, lytes WNL, VSS, (HR ranged from 49-70s o/n asymptomatic while sleeping, d/w attending no intervention @ this time), asymptomatic, denies cp, sob, meek, palpitations, lightheadedness, syncope. She has HHA 24 h / day private and will be reinstated by family. Patient seen and examined by Dr. Barahona and Maribeth and deemed s table for d/c with follow up. Patient has h/o of pafib in [place] 2 wks ago during UTI-- no AC per Maribeth as pt is advanced age/dementia and fall risk. On ASA 81. Instructed to return if symptoms worsen including not limited to CP, SOB, MEEK, palpitations, LOC, syncope. Extensive conversations had with family about increasing PO intake and hydration.   Requested mucinex and flonase as needed for congestion at home and outpatient PT. scripts given.

## 2018-01-17 NOTE — DISCHARGE NOTE ADULT - PLAN OF CARE
You presented to the hospital with an episode of syncope or passing out that was likely vasovagal in nature. You underwent testing including CT of the head, echocardiogram, ECG, carotid ultrasound, and bloodwork which was all normal. Please make sure that you continue your home medications and make sure to stand up slowly from a seated position. We treated you with normal saline IV fluids, and recommend that you eat frequently throughout the day and stay hydrated. If this episode occurs again, we recommend coming back into the hospital. You have a history of atrial fibrillation when you were in the hospital in [place] with your UTI. Please make sure that you continue a baby Aspirin (81 mg orally daily). We are not recommending that you start on blood thinners to prevent stroke because you are at risk for falling. The risks outweigh the benefits. You have been in normal rhythm on this hospital stay. You have a history of urinary tract infection and were treated with antibiotics. We repeated your urine testing and it was negative for a UTI. If your symptoms return, please make sure that you report your symptoms as you may need further antibiotics. We also sent a urine culture so Dr. Barahona can follow this up as an outpatient. You have a history of dementia, please continue your home medications. You have a history of high blood pressure, please Continue Losartan-Hydrochlorothiazide 100-25 mg orally daily. Please also continue Atorvastatin 10 mg orally daily for cholesterol. Please also continue Aspirin 81 mg orally daily. Please make sure that you continue your home medications and make sure to stand up slowly from a seated position. We treated you with normal saline IV fluids, and recommend that you eat frequently throughout the day and stay hydrated. If this episode occurs again, we recommend coming back into the hospital.  Carotid Ultrasound performed revealed < 50% plaque in both arteries. This is not significant.

## 2018-01-17 NOTE — DISCHARGE NOTE ADULT - CARE PLAN
Principal Discharge DX:	Syncope  Goal:	You presented to the hospital with an episode of syncope or passing out that was likely vasovagal in nature. You underwent testing including CT of the head, echocardiogram, ECG, carotid ultrasound, and bloodwork which was all normal.  Assessment and plan of treatment:	Please make sure that you continue your home medications and make sure to stand up slowly from a seated position. We treated you with normal saline IV fluids, and recommend that you eat frequently throughout the day and stay hydrated. If this episode occurs again, we recommend coming back into the hospital.  Secondary Diagnosis:	Paroxysmal atrial fibrillation  Goal:	You have a history of atrial fibrillation when you were in the hospital in [place] with your UTI. Please make sure that you continue a baby Aspirin (81 mg orally daily).  Assessment and plan of treatment:	We are not recommending that you start on blood thinners to prevent stroke because you are at risk for falling. The risks outweigh the benefits. You have been in normal rhythm on this hospital stay.  Secondary Diagnosis:	UTI (urinary tract infection)  Goal:	You have a history of urinary tract infection and were treated with antibiotics. We repeated your urine testing and it was negative for a UTI.  Assessment and plan of treatment:	If your symptoms return, please make sure that you report your symptoms as you may need further antibiotics. We also sent a urine culture so Dr. Barahona can follow this up as an outpatient.  Secondary Diagnosis:	Dementia  Goal:	You have a history of dementia, please continue your home medications.  Secondary Diagnosis:	Essential hypertension  Goal:	You have a history of high blood pressure, please Continue Losartan-Hydrochlorothiazide 100-25 mg orally daily. Please also continue Atorvastatin 10 mg orally daily for cholesterol. Please also continue Aspirin 81 mg orally daily. Principal Discharge DX:	Syncope  Goal:	You presented to the hospital with an episode of syncope or passing out that was likely vasovagal in nature. You underwent testing including CT of the head, echocardiogram, ECG, carotid ultrasound, and bloodwork which was all normal.  Assessment and plan of treatment:	Please make sure that you continue your home medications and make sure to stand up slowly from a seated position. We treated you with normal saline IV fluids, and recommend that you eat frequently throughout the day and stay hydrated. If this episode occurs again, we recommend coming back into the hospital.  Carotid Ultrasound performed revealed < 50% plaque in both arteries. This is not significant.  Secondary Diagnosis:	Paroxysmal atrial fibrillation  Goal:	You have a history of atrial fibrillation when you were in the hospital in [place] with your UTI. Please make sure that you continue a baby Aspirin (81 mg orally daily).  Assessment and plan of treatment:	We are not recommending that you start on blood thinners to prevent stroke because you are at risk for falling. The risks outweigh the benefits. You have been in normal rhythm on this hospital stay.  Secondary Diagnosis:	UTI (urinary tract infection)  Goal:	You have a history of urinary tract infection and were treated with antibiotics. We repeated your urine testing and it was negative for a UTI.  Assessment and plan of treatment:	If your symptoms return, please make sure that you report your symptoms as you may need further antibiotics. We also sent a urine culture so Dr. Barahona can follow this up as an outpatient.  Secondary Diagnosis:	Dementia  Goal:	You have a history of dementia, please continue your home medications.  Secondary Diagnosis:	Essential hypertension  Goal:	You have a history of high blood pressure, please Continue Losartan-Hydrochlorothiazide 100-25 mg orally daily. Please also continue Atorvastatin 10 mg orally daily for cholesterol. Please also continue Aspirin 81 mg orally daily.

## 2018-01-17 NOTE — DISCHARGE NOTE ADULT - MEDICATION SUMMARY - MEDICATIONS TO TAKE
I will START or STAY ON the medications listed below when I get home from the hospital:    Outpatient Physical Therapy  -- Indication: For Physical therapy    Aspirin Enteric Coated 81 mg oral delayed release tablet  -- 1 tab(s) by mouth once a day  -- Indication: For Prevention    atorvastatin 10 mg oral tablet  -- 1 tab(s) by mouth once a day  -- Indication: For cholesterol    losartan-hydroCHLOROthiazide 100 mg-25 mg oral tablet  -- 1 tab(s) by mouth once a day  -- Indication: For Hypertension    galantamine 24 mg oral capsule, extended release  -- 1 cap(s) by mouth once a day (in the morning)  -- Indication: For Dementia    guaiFENesin 200 mg oral tablet  -- 1 tab(s) by mouth 2 times a day, As Needed -for congestion   -- Medication should be taken with plenty of water.    -- Indication: For mucous     Namenda 5 mg oral tablet  -- 1 tab(s) by mouth 2 times a day  -- Indication: For Dementia    Flonase 50 mcg/inh nasal spray  -- 1 spray(s) intranasally once a day, As Needed -for congestion   -- For the nose.  It is very important that you take or use this exactly as directed.  Do not skip doses or discontinue unless directed by your doctor.    -- Indication: For congestion    Vitamin D3 1000 intl units oral tablet  -- 1 tab(s) by mouth once a day  -- Indication: For Supplement

## 2018-01-18 LAB
CULTURE RESULTS: SIGNIFICANT CHANGE UP
SPECIMEN SOURCE: SIGNIFICANT CHANGE UP

## 2018-01-19 DIAGNOSIS — I10 ESSENTIAL (PRIMARY) HYPERTENSION: ICD-10-CM

## 2018-01-19 DIAGNOSIS — F03.90 UNSPECIFIED DEMENTIA WITHOUT BEHAVIORAL DISTURBANCE: ICD-10-CM

## 2018-01-19 DIAGNOSIS — R55 SYNCOPE AND COLLAPSE: ICD-10-CM

## 2018-01-19 DIAGNOSIS — I48.0 PAROXYSMAL ATRIAL FIBRILLATION: ICD-10-CM

## 2018-01-19 DIAGNOSIS — I35.0 NONRHEUMATIC AORTIC (VALVE) STENOSIS: ICD-10-CM

## 2018-01-19 DIAGNOSIS — E78.5 HYPERLIPIDEMIA, UNSPECIFIED: ICD-10-CM

## 2018-01-19 DIAGNOSIS — J32.9 CHRONIC SINUSITIS, UNSPECIFIED: ICD-10-CM

## 2018-01-19 DIAGNOSIS — Z79.82 LONG TERM (CURRENT) USE OF ASPIRIN: ICD-10-CM

## 2018-01-19 DIAGNOSIS — Z88.1 ALLERGY STATUS TO OTHER ANTIBIOTIC AGENTS STATUS: ICD-10-CM

## 2018-01-19 DIAGNOSIS — Z86.73 PERSONAL HISTORY OF TRANSIENT ISCHEMIC ATTACK (TIA), AND CEREBRAL INFARCTION WITHOUT RESIDUAL DEFICITS: ICD-10-CM

## 2018-01-30 ENCOUNTER — APPOINTMENT (OUTPATIENT)
Dept: HEART AND VASCULAR | Facility: CLINIC | Age: 83
End: 2018-01-30
Payer: MEDICARE

## 2018-01-30 VITALS
HEART RATE: 59 BPM | WEIGHT: 129.98 LBS | SYSTOLIC BLOOD PRESSURE: 145 MMHG | TEMPERATURE: 97.3 F | DIASTOLIC BLOOD PRESSURE: 68 MMHG | BODY MASS INDEX: 23.92 KG/M2 | OXYGEN SATURATION: 96 % | HEIGHT: 62 IN

## 2018-01-30 PROCEDURE — 99214 OFFICE O/P EST MOD 30 MIN: CPT | Mod: 25

## 2018-01-30 PROCEDURE — 93000 ELECTROCARDIOGRAM COMPLETE: CPT

## 2018-02-06 ENCOUNTER — INPATIENT (INPATIENT)
Facility: HOSPITAL | Age: 83
LOS: 6 days | Discharge: HOME HEALTH | DRG: 177 | End: 2018-02-13
Attending: INTERNAL MEDICINE | Admitting: INTERNAL MEDICINE
Payer: MEDICARE

## 2018-02-06 VITALS
SYSTOLIC BLOOD PRESSURE: 118 MMHG | HEART RATE: 98 BPM | RESPIRATION RATE: 18 BRPM | WEIGHT: 134.48 LBS | DIASTOLIC BLOOD PRESSURE: 59 MMHG | TEMPERATURE: 98 F | OXYGEN SATURATION: 98 %

## 2018-02-06 DIAGNOSIS — I49.5 SICK SINUS SYNDROME: ICD-10-CM

## 2018-02-06 DIAGNOSIS — R41.82 ALTERED MENTAL STATUS, UNSPECIFIED: ICD-10-CM

## 2018-02-06 DIAGNOSIS — I10 ESSENTIAL (PRIMARY) HYPERTENSION: ICD-10-CM

## 2018-02-06 DIAGNOSIS — E78.5 HYPERLIPIDEMIA, UNSPECIFIED: ICD-10-CM

## 2018-02-06 DIAGNOSIS — E87.1 HYPO-OSMOLALITY AND HYPONATREMIA: ICD-10-CM

## 2018-02-06 LAB
ALBUMIN SERPL ELPH-MCNC: 3.5 G/DL — SIGNIFICANT CHANGE UP (ref 3.3–5)
ALP SERPL-CCNC: 69 U/L — SIGNIFICANT CHANGE UP (ref 40–120)
ALT FLD-CCNC: 16 U/L — SIGNIFICANT CHANGE UP (ref 10–45)
ANION GAP SERPL CALC-SCNC: 11 MMOL/L — SIGNIFICANT CHANGE UP (ref 5–17)
APPEARANCE UR: CLEAR — SIGNIFICANT CHANGE UP
AST SERPL-CCNC: 30 U/L — SIGNIFICANT CHANGE UP (ref 10–40)
BILIRUB SERPL-MCNC: 0.4 MG/DL — SIGNIFICANT CHANGE UP (ref 0.2–1.2)
BILIRUB UR-MCNC: NEGATIVE — SIGNIFICANT CHANGE UP
BUN SERPL-MCNC: 19 MG/DL — SIGNIFICANT CHANGE UP (ref 7–23)
CALCIUM SERPL-MCNC: 8.8 MG/DL — SIGNIFICANT CHANGE UP (ref 8.4–10.5)
CHLORIDE SERPL-SCNC: 90 MMOL/L — LOW (ref 96–108)
CO2 SERPL-SCNC: 25 MMOL/L — SIGNIFICANT CHANGE UP (ref 22–31)
COLOR SPEC: YELLOW — SIGNIFICANT CHANGE UP
CREAT ?TM UR-MCNC: 27 MG/DL — SIGNIFICANT CHANGE UP
CREAT SERPL-MCNC: 1.05 MG/DL — SIGNIFICANT CHANGE UP (ref 0.5–1.3)
DIFF PNL FLD: (no result)
GLUCOSE SERPL-MCNC: 122 MG/DL — HIGH (ref 70–99)
GLUCOSE UR QL: NEGATIVE — SIGNIFICANT CHANGE UP
HCT VFR BLD CALC: 29.4 % — LOW (ref 34.5–45)
HGB BLD-MCNC: 10.2 G/DL — LOW (ref 11.5–15.5)
KETONES UR-MCNC: NEGATIVE — SIGNIFICANT CHANGE UP
LEUKOCYTE ESTERASE UR-ACNC: (no result)
LYMPHOCYTES # BLD AUTO: 9 % — LOW (ref 13–44)
MAGNESIUM SERPL-MCNC: 1.8 MG/DL — SIGNIFICANT CHANGE UP (ref 1.6–2.6)
MCHC RBC-ENTMCNC: 31 PG — SIGNIFICANT CHANGE UP (ref 27–34)
MCHC RBC-ENTMCNC: 34.7 G/DL — SIGNIFICANT CHANGE UP (ref 32–36)
MCV RBC AUTO: 89.4 FL — SIGNIFICANT CHANGE UP (ref 80–100)
MONOCYTES NFR BLD AUTO: 7 % — SIGNIFICANT CHANGE UP (ref 2–14)
NEUTROPHILS NFR BLD AUTO: 82 % — HIGH (ref 43–77)
NITRITE UR-MCNC: NEGATIVE — SIGNIFICANT CHANGE UP
OSMOLALITY SERPL: 277 MOSM/KG — LOW (ref 280–301)
OSMOLALITY UR: 188 MOSMOL/KG — SIGNIFICANT CHANGE UP (ref 100–650)
PH UR: 6 — SIGNIFICANT CHANGE UP (ref 5–8)
PLATELET # BLD AUTO: 192 K/UL — SIGNIFICANT CHANGE UP (ref 150–400)
POTASSIUM SERPL-MCNC: 3.8 MMOL/L — SIGNIFICANT CHANGE UP (ref 3.5–5.3)
POTASSIUM SERPL-SCNC: 3.8 MMOL/L — SIGNIFICANT CHANGE UP (ref 3.5–5.3)
PROT SERPL-MCNC: 7 G/DL — SIGNIFICANT CHANGE UP (ref 6–8.3)
PROT UR-MCNC: NEGATIVE MG/DL — SIGNIFICANT CHANGE UP
RBC # BLD: 3.29 M/UL — LOW (ref 3.8–5.2)
RBC # FLD: 14 % — SIGNIFICANT CHANGE UP (ref 10.3–16.9)
SODIUM SERPL-SCNC: 126 MMOL/L — LOW (ref 135–145)
SODIUM UR-SCNC: 31 MMOL/L — SIGNIFICANT CHANGE UP
SP GR SPEC: <=1.005 — SIGNIFICANT CHANGE UP (ref 1–1.03)
TROPONIN T SERPL-MCNC: <0.01 NG/ML — SIGNIFICANT CHANGE UP (ref 0–0.01)
UROBILINOGEN FLD QL: 0.2 E.U./DL — SIGNIFICANT CHANGE UP
WBC # BLD: 7.2 K/UL — SIGNIFICANT CHANGE UP (ref 3.8–10.5)
WBC # FLD AUTO: 7.2 K/UL — SIGNIFICANT CHANGE UP (ref 3.8–10.5)

## 2018-02-06 PROCEDURE — 70450 CT HEAD/BRAIN W/O DYE: CPT | Mod: 26

## 2018-02-06 PROCEDURE — 99285 EMERGENCY DEPT VISIT HI MDM: CPT

## 2018-02-06 PROCEDURE — 99223 1ST HOSP IP/OBS HIGH 75: CPT | Mod: AI

## 2018-02-06 RX ORDER — HYDROCHLOROTHIAZIDE 25 MG
25 TABLET ORAL DAILY
Qty: 0 | Refills: 0 | Status: DISCONTINUED | OUTPATIENT
Start: 2018-02-06 | End: 2018-02-06

## 2018-02-06 RX ORDER — CHOLECALCIFEROL (VITAMIN D3) 125 MCG
1000 CAPSULE ORAL DAILY
Qty: 0 | Refills: 0 | Status: DISCONTINUED | OUTPATIENT
Start: 2018-02-06 | End: 2018-02-13

## 2018-02-06 RX ORDER — SODIUM CHLORIDE 9 MG/ML
1000 INJECTION INTRAMUSCULAR; INTRAVENOUS; SUBCUTANEOUS ONCE
Qty: 0 | Refills: 0 | Status: COMPLETED | OUTPATIENT
Start: 2018-02-06 | End: 2018-02-06

## 2018-02-06 RX ORDER — LOSARTAN POTASSIUM 100 MG/1
100 TABLET, FILM COATED ORAL DAILY
Qty: 0 | Refills: 0 | Status: DISCONTINUED | OUTPATIENT
Start: 2018-02-06 | End: 2018-02-13

## 2018-02-06 RX ORDER — SODIUM CHLORIDE 9 MG/ML
1000 INJECTION INTRAMUSCULAR; INTRAVENOUS; SUBCUTANEOUS
Qty: 0 | Refills: 0 | Status: DISCONTINUED | OUTPATIENT
Start: 2018-02-06 | End: 2018-02-07

## 2018-02-06 RX ORDER — MEMANTINE HYDROCHLORIDE 10 MG/1
5 TABLET ORAL
Qty: 0 | Refills: 0 | Status: DISCONTINUED | OUTPATIENT
Start: 2018-02-06 | End: 2018-02-13

## 2018-02-06 RX ORDER — HEPARIN SODIUM 5000 [USP'U]/ML
5000 INJECTION INTRAVENOUS; SUBCUTANEOUS EVERY 8 HOURS
Qty: 0 | Refills: 0 | Status: DISCONTINUED | OUTPATIENT
Start: 2018-02-06 | End: 2018-02-13

## 2018-02-06 RX ORDER — ATORVASTATIN CALCIUM 80 MG/1
10 TABLET, FILM COATED ORAL AT BEDTIME
Qty: 0 | Refills: 0 | Status: DISCONTINUED | OUTPATIENT
Start: 2018-02-06 | End: 2018-02-13

## 2018-02-06 RX ORDER — ASPIRIN/CALCIUM CARB/MAGNESIUM 324 MG
81 TABLET ORAL DAILY
Qty: 0 | Refills: 0 | Status: DISCONTINUED | OUTPATIENT
Start: 2018-02-06 | End: 2018-02-13

## 2018-02-06 RX ORDER — PANTOPRAZOLE SODIUM 20 MG/1
40 TABLET, DELAYED RELEASE ORAL
Qty: 0 | Refills: 0 | Status: DISCONTINUED | OUTPATIENT
Start: 2018-02-06 | End: 2018-02-13

## 2018-02-06 RX ADMIN — MEMANTINE HYDROCHLORIDE 5 MILLIGRAM(S): 10 TABLET ORAL at 23:25

## 2018-02-06 RX ADMIN — SODIUM CHLORIDE 75 MILLILITER(S): 9 INJECTION INTRAMUSCULAR; INTRAVENOUS; SUBCUTANEOUS at 17:16

## 2018-02-06 RX ADMIN — ATORVASTATIN CALCIUM 10 MILLIGRAM(S): 80 TABLET, FILM COATED ORAL at 23:26

## 2018-02-06 RX ADMIN — HEPARIN SODIUM 5000 UNIT(S): 5000 INJECTION INTRAVENOUS; SUBCUTANEOUS at 23:25

## 2018-02-06 RX ADMIN — SODIUM CHLORIDE 2000 MILLILITER(S): 9 INJECTION INTRAMUSCULAR; INTRAVENOUS; SUBCUTANEOUS at 11:44

## 2018-02-06 RX ADMIN — Medication 1000 UNIT(S): at 23:26

## 2018-02-06 NOTE — H&P ADULT - PROBLEM SELECTOR PLAN 3
Dr. Cates  - Na 126, K 3.8.. with h/o decreased PO intake over the past few days   - s/p 1 liter of fluid  - running NS 75 cc over 8 hours   - repeat BMP ordered Dr. Cates  - Na 126, K 3.8.. with h/o decreased PO intake over the past few days   - s/p 1 liter of fluid  - running NS 75 cc over 8 hours   - repeat BMP ordered, serum osm, cortisol, urine lytes  - HOLDING HCTZ per Dr. Cates

## 2018-02-06 NOTE — H&P ADULT - ASSESSMENT
89 y/o active female (lives alone w/24h HHA) w/ PMHx of HTN, HLD, Dementia (A&Ox1), hx Cerebral Artery Occlusion, pAfib (no AC, pt. new onset ~1mo ago 2/2 to UTI/Pyelo in [place]; currently SR) presented to [facility] ED 1/16 after change in functional status / lethargy per family. She was recently admitted to [facility] for syncopal work up which was negative. Patient will be admitted for monitoring overnight, IV hydration, and EP consult for possible SSS.

## 2018-02-06 NOTE — ED PROVIDER NOTE - CARE PLAN
Principal Discharge DX:	Altered mental status, unspecified altered mental status type  Secondary Diagnosis:	Hyponatremia

## 2018-02-06 NOTE — ED ADULT NURSE NOTE - OBJECTIVE STATEMENT
Pt is a 89y female brought in by her son because of new onset of weakness. "When I went to pick her up today she was hunched over and was unable to walk. She is normally more alert and able to walk." Pt is aware of person, not time or place. Pt denies being in pain. As per son pt  was hospitalized about a month ago for a UTI. As per son pt also had an episode of syncope a few weeks ago and was admitted. Pt denies pain, nausea, vomiting, chills. Safety precautions maintained. Will continue to monitor.

## 2018-02-06 NOTE — H&P ADULT - ATTENDING COMMENTS
CC: syncope  ROS: 12 point ROS otherwise negative except as stated above in subjective  Agree with above.

## 2018-02-06 NOTE — H&P ADULT - NSHPPHYSICALEXAM_GEN_ALL_CORE
Appearance: Normal	  HEENT:   Normal oral mucosa, PERRL, EOMI	  Neck: Supple, + JVD/ - JVD; No Carotid Bruit and 2+ pulses B/L  Cardiovascular: Normal S1 S2, No JVD, No murmurs  Respiratory: Lungs clear to auscultation/Decreased Breath Sounds/No Rales, Rhonchi, Wheezing	  Gastrointestinal:  Soft, Non-tender, + BS	  Skin: No rashes, No ecchymoses, No cyanosis  Extremities: Normal range of motion, No clubbing, cyanosis or edema  Vascular: Femoral pulses 2+ b/l without bruit, DP 1+ b/l, PT 1+ b/l  Neurologic: Non-focal  Psychiatry: A & O x 3, Mood & affect appropriate Appearance: Normal	  HEENT:   Normal oral mucosa, PERRL, EOMI	  Neck: Supple, - JVD; No Carotid Bruit   Cardiovascular: Normal S1 S2, No JVD, No murmurs  Respiratory: Lungs clear to auscultation/No Rales, Rhonchi, Wheezing	  Gastrointestinal:  Soft, Non-tender	  Skin: No rashes, No ecchymoses, No cyanosis  Extremities: Normal range of motion, No clubbing, cyanosis or edema  Neurologic: Non-focal  Psychiatry: A & O x 1

## 2018-02-06 NOTE — H&P ADULT - HISTORY OF PRESENT ILLNESS
89 y/o active female (lives alone w/24hour home health aid) w/ PMHx of HTN, HLD, Dementia, hx Cerebral Artery Occlusion, pAfib (no AC, pt. new onset ~1mo ago 2/2 to UTI/Pyelo in [place]; currently SR) presented to [facility] ED sent in by Dr. Barahona for     Of note, patient was recently hospitalized December 2017 in [place], [place] for UTI/Pyelo and sinusitis and treated with IV abx, and upon return to [place], had a syncopal episode and was admitted to [facility] with a negative work up including CTA brain, Echo 1/6 revealing NL LV wall motion, LVEF NL 60-65%, No AS. Carotid US revealed mild-mod dz, TSH WNL, and UA repeated which was negative for UTI and urine culture negative. Pt was given a Holter Monitor otpt which revealed no atrial fibrillation, but bradycardia to 40s.   In the ED today VSS, afebrile, ECG NSR @ 61BPM no acute STT changes, CT Head unchanged, labs sig for troponin negative x1, Na 126, K 3.8. Pt will be admitted to [facility] for further monitoring, EP consult, and work up for possible sick sinus syndrome. 89 y/o active female (lives alone w/24h HHA) w/ PMHx of HTN, HLD, Dementia (A&Ox1), hx Cerebral Artery Occlusion, pAfib (no AC, pt. new onset ~1mo ago 2/2 to UTI/Pyelo in [place]; currently SR) presented to [facility] ED 1/16 after change in functional status / lethargy per family. Pt's son stated that upon arriving to her apt this AM to take her to breakfast, she was slumped over and lethargic. Normally she walks on her own, but she needed her son and daughter-in-law to help her down the stairs. The aid reported that she had a decreased PO intake over the past few days but no new symptoms or complaints aside from mild back pain since her last admission to [facility] for syncope work up. Denied fever, chills, recent illness, sick contacts, chest pain, SOB, HAYDEN, orthopnea, LE edema, n/v/d, abd pain, dysuria, hematuria. Pt herself denies any symptoms however A&Ox1. Of note, patient was recently hospitalized December 2017 in [place], [place] for UTI/Pyelo and sinusitis and treated with IV abx, and upon return to [place], had a syncopal episode 1/15 and was admitted to [facility] with a negative work up including CTA brain, Echo 1/16 revealing NL LV wall motion, LVEF NL 60-65%, No AS. Carotid US revealed mild-mod dz, TSH WNL, and UA repeated which was negative for UTI and urine culture negative. Pt was given a Holter Monitor otpt by Dr. Stahl which revealed no atrial fibrillation, but bradycardia to 40s. In the ED today VSS, afebrile, ECG NSR @ 61 BPM no acute STT changes, CT Head unchanged, labs sig for troponin negative x1, Na 126, K 3.8. Pt will be admitted to [facility] for further monitoring, EP consult, and work up for possible sick sinus syndrome.

## 2018-02-06 NOTE — H&P ADULT - NSHPLABSRESULTS_GEN_ALL_CORE
ICU Vital Signs Last 24 Hrs  T(C): 36.9 (06 Feb 2018 12:54), Max: 36.9 (06 Feb 2018 10:23)  T(F): 98.4 (06 Feb 2018 12:54), Max: 98.5 (06 Feb 2018 11:52)  HR: 64 (06 Feb 2018 12:54) (61 - 98)  BP: 134/68 (06 Feb 2018 12:54) (118/59 - 137/79)  BP(mean): --  ABP: --  ABP(mean): --  RR: 18 (06 Feb 2018 12:54) (18 - 18)  SpO2: 99% (06 Feb 2018 12:54) (98% - 99%)                          10.2   7.2   )-----------( 192      ( 06 Feb 2018 11:04 )             29.4   02-06    126<L>  |  90<L>  |  19  ----------------------------<  122<H>  3.8   |  25  |  1.05    Ca    8.8      06 Feb 2018 11:04  Mg     1.8     02-06    TPro  7.0  /  Alb  3.5  /  TBili  0.4  /  DBili  x   /  AST  30  /  ALT  16  /  AlkPhos  69  02-06    Troponin T, Serum (02.06.18 @ 11:04)    Troponin T, Serum: <0.01: Reference interval for troponin T is </= 0.01 ng/mL which includes the  99th percentile of a healthy population. Troponin T results are not  interchangeable with troponin I results. ng/mL    < from: CT Head No Cont (02.06.18 @ 11:37) >    IMPRESSION:    1.  No acute intracranial hemorrhage or acute transcortical  2.  Age-appropriate volume loss with ex vacuo dilatation of the   ventricles. ICU Vital Signs Last 24 Hrs  T(C): 36.9 (06 Feb 2018 12:54), Max: 36.9 (06 Feb 2018 10:23)  T(F): 98.4 (06 Feb 2018 12:54), Max: 98.5 (06 Feb 2018 11:52)  HR: 64 (06 Feb 2018 12:54) (61 - 98)  BP: 134/68 (06 Feb 2018 12:54) (118/59 - 137/79)  BP(mean): --  ABP: --  ABP(mean): --  RR: 18 (06 Feb 2018 12:54) (18 - 18)  SpO2: 99% (06 Feb 2018 12:54) (98% - 99%)                          10.2   7.2   )-----------( 192      ( 06 Feb 2018 11:04 )             29.4   02-06    126<L>  |  90<L>  |  19  ----------------------------<  122<H>  3.8   |  25  |  1.05    Ca    8.8      06 Feb 2018 11:04  Mg     1.8     02-06    TPro  7.0  /  Alb  3.5  /  TBili  0.4  /  DBili  x   /  AST  30  /  ALT  16  /  AlkPhos  69  02-06    Troponin T, Serum (02.06.18 @ 11:04)    Troponin T, Serum: <0.01: Reference interval for troponin T is </= 0.01 ng/mL which includes the  99th percentile of a healthy population. Troponin T results are not  interchangeable with troponin I results. ng/mL    CT Head No Cont (02.06.18 @ 11:37)    IMPRESSION:    1.  No acute intracranial hemorrhage or acute transcortical  2.  Age-appropriate volume loss with ex vacuo dilatation of the   ventricles.

## 2018-02-06 NOTE — CONSULT NOTE ADULT - SUBJECTIVE AND OBJECTIVE BOX
Patient is a 89y old  Female who presents with a chief complaint of       HPI:  89 y/o active female (lives alone w/24h HHA) w/ PMHx of HTN, HLD, Dementia (A&Ox1), hx Cerebral Artery Occlusion, pAfib (no AC, pt. new onset ~1mo ago 2/2 to UTI/Pyelo in [place]; currently SR) presented to [facility] ED 1/16 after change in functional status / lethargy per family. Pt's son stated that upon arriving to her apt this AM to take her to breakfast, she was slumped over and lethargic. Normally she walks on her own, but she needed her son and daughter-in-law to help her down the stairs. The aid reported that she had a decreased PO intake over the past few days but no new symptoms or complaints aside from mild back pain since her last admission to [facility] for syncope work up. Denied fever, chills, recent illness, sick contacts, chest pain, SOB, HAYDEN, orthopnea, LE edema, n/v/d, abd pain, dysuria, hematuria. Pt herself denies any symptoms however A&Ox1. Of note, patient was recently hospitalized December 2017 in [place], [place] for UTI/Pyelo and sinusitis and treated with IV abx, and upon return to [place], had a syncopal episode 1/15 and was admitted to [facility] with a negative work up including CTA brain, Echo 1/16 revealing NL LV wall motion, LVEF NL 60-65%, No AS. Carotid US revealed mild-mod dz, TSH WNL, and UA repeated which was negative for UTI and urine culture negative. Pt was given a Holter Monitor otpt by Dr. Stahl which revealed no atrial fibrillation, but bradycardia to 40s. In the ED today VSS, afebrile, ECG NSR @ 61 BPM no acute STT changes, CT Head unchanged, labs sig for troponin negative x1, Na 126, K 3.8. Pt will be admitted to [facility] for further monitoring, EP consult, and work up for possible sick sinus syndrome. (06 Feb 2018 13:03)     Altered mental status- negative CT head      Allergies  doxycycline (Unknown)      Health Issues  ALTERED MENTAL STATUSHYPOANTREMIA  No h/o HF  Handoff  MEWS Score  AS (aortic stenosis)  Atrial fibrillation  Sinusitis  UTI (urinary tract infection)  Essential hypertension  Cerebral artery occlusion with cerebral infarction  Hyperlipidemia  Memory loss  Fall  Altered mental status, unspecified altered mental status type  Hyperlipidemia  Essential hypertension  Hyponatremia  Sick sinus syndrome  Altered mental status, unspecified altered mental status type  No significant past surgical history  CONFUSION  19  Hyponatremia        FAMILY HISTORY:      MEDICATIONS  (STANDING):  aspirin enteric coated 81 milliGRAM(s) Oral daily  atorvastatin 10 milliGRAM(s) Oral at bedtime  cholecalciferol 1000 Unit(s) Oral daily  heparin  Injectable 5000 Unit(s) SubCutaneous every 8 hours  losartan 100 milliGRAM(s) Oral daily  memantine 5 milliGRAM(s) Oral two times a day  pantoprazole    Tablet 40 milliGRAM(s) Oral before breakfast  sodium chloride 0.9%. 1000 milliLiter(s) (75 mL/Hr) IV Continuous <Continuous>    MEDICATIONS  (PRN):      PAST MEDICAL & SURGICAL HISTORY:  AS (aortic stenosis)  Atrial fibrillation: Pt. had new onset of Afib two weeks ago while hospitalized for UTI and Sinusitis.  No AC.  Pt. currently SR  Sinusitis  UTI (urinary tract infection): Pt. was recently hospitalized in [place] two weeks ago tx with ABx  Essential hypertension: HTN (hypertension)  Cerebral artery occlusion with cerebral infarction: CVA (cerebral infarction)  Hyperlipidemia: HLD (hyperlipidemia)  Memory loss: Memory loss  Fall: Falls  No significant past surgical history      Labs                          10.2   7.2   )-----------( 192      ( 06 Feb 2018 11:04 )             29.4     02-06    126<L>  |  90<L>  |  19  ----------------------------<  122<H>  3.8   |  25  |  1.05    Ca    8.8      06 Feb 2018 11:04  Mg     1.8     02-06    TPro  7.0  /  Alb  3.5  /  TBili  0.4  /  DBili  x   /  AST  30  /  ALT  16  /  AlkPhos  69  02-06      Radiology:    Physical Exam    MENTAL STATUS  -Level of Consciousness-lethargic    Orientation- person  Language- aphasia/ dysarthria n/a  Memory- recent and remoten/a      Cranial Nerve 1- 12  Pupils- equal and reactive  Eye movements- full EOM  Facial - no asymmetry   Lower CN-nl    Gait and Station-n/a    MOTOR  Upper-no drift   Lower-no foot drop    Reflexes- decreased    Sensation- n/a    Cerebellar- no tremors    vascular -no bruits    Assessment- No evidence of new infarc    Plan As per medicine

## 2018-02-06 NOTE — H&P ADULT - PROBLEM SELECTOR PLAN 2
Concern for SSS, EP (service)   - Holter monitor @ home with eb to 40s  - no runs of afib noted  - concern for possible SSS  - Echo 1/16/18 with NL EF 60-65%, NL wall motion, No HD sig valvular disease  - continue tele, replete lytes Concern for SSS, EP (service)   - Holter monitor @ home with eb to 40s  - no runs of afib noted  - concern for possible SSS  - Echo 1/16/18 with NL EF 60-65%, NL wall motion, No HD sig valvular disease  - continue tele, replete lytes  - R/o ACS. trop neg x1.

## 2018-02-06 NOTE — H&P ADULT - PROBLEM SELECTOR PLAN 1
Change in Functional Status (Dr. Mcrae)  - patient apparently found at home acutely lethargic... has h/o syncopal episode with negative work up 1/16/18 @ [facility]  - CT head/ CTA head with no abnormalities, Echo NL.  - TSH WNL  - Carotid US no HD sig stenosis.   - F/U neurology recommendations  - Continue dementia medication -- Namenda, Galantamine  - PT consult  - enhanced supervision/ fall risk protocol

## 2018-02-06 NOTE — H&P ADULT - PROBLEM SELECTOR PLAN 4
- Normotensive currently, monitor.  - Losartan- HCTZ with holding parameters - Normotensive currently, monitor.  - Losartan with holding parameters, holding HCTZ 2/2 hyponatremia

## 2018-02-06 NOTE — ED PROVIDER NOTE - MEDICAL DECISION MAKING DETAILS
here w/ AMS since saturday, waxing and waning with no clear focality. Holter w/ drop in HR to 46, EP consulted to eval for sick sinus. New hyponatremia noted, pt with ?decreased po, will try 1L IVF and repeat to see if improves.

## 2018-02-06 NOTE — ED ADULT NURSE REASSESSMENT NOTE - NS ED NURSE REASSESS COMMENT FT1
Pt placed on cardiac monitor. Iv fluids running. Pt's son at bedside. Will continue to monitor.
pt in no acute distress. safety maintained. family at bedside. will continue to monitor.

## 2018-02-06 NOTE — ED PROVIDER NOTE - PHYSICAL EXAMINATION
CONSTITUTIONAL: Well-appearing; well-nourished; in no apparent distress.   HEAD: Normocephalic; atraumatic.   EYES:  conjunctiva and sclera clear  ENT: normal nose; no rhinorrhea; normal pharynx with no erythema or lesions.   NECK: Supple; non-tender;   CARDIOVASCULAR: Normal S1, S2; no murmurs, rubs, or gallops. Regular rate and rhythm.   RESPIRATORY: Breathing easily; breath sounds clear and equal bilaterally; no wheezes, rhonchi, or rales.  GI: Soft; non-distended; non-tender; no palpable organomegaly.   EXT: No cyanosis or edema; N/V intact  SKIN: Normal for age and race; warm; dry; good turgor; no apparent lesions or rash.   NEURO: face symmetric; grossly unremarkable. moving all extremities equally.  PSYCHOLOGICAL: The patient’s mood and manner are appropriate.

## 2018-02-06 NOTE — ED PROVIDER NOTE - OBJECTIVE STATEMENT
89 y/o active female (lives alone w/24h HHA) w/ PMHx of HTN, HLD, Dementia (A&Ox1), hx Cerebral Artery Occlusion, pAfib (no AC, pt. new onset ~1mo ago 2/2 to UTI/Pyelo in [place]; currently SR) presents today w/ family for concern for confusion and decreased po intake. has not been eating/drinking much x 3 days and family feels that she is altered. pt is confused and and cannot give a history.

## 2018-02-06 NOTE — H&P ADULT - PROBLEM SELECTOR PLAN 5
- continue Lipitor 10 mg PO daily    DVT PPX: hep SC  GI PPX: protonix  DISPO: pending consult from EP, neurology, renal

## 2018-02-06 NOTE — ED ADULT TRIAGE NOTE - CHIEF COMPLAINT QUOTE
As per son, pt with increased confusion. Last seen normal was yesterday. Pt's son quotes "Last time she was here, she had a UTI."

## 2018-02-06 NOTE — ED ADULT NURSE NOTE - PMH
AS (aortic stenosis)    Atrial fibrillation  Pt. had new onset of Afib two weeks ago while hospitalized for UTI and Sinusitis.  No AC.  Pt. currently SR  Cerebral artery occlusion with cerebral infarction  CVA (cerebral infarction)  Essential hypertension  HTN (hypertension)  Fall  Falls  Hyperlipidemia  HLD (hyperlipidemia)  Memory loss  Memory loss  Sinusitis    UTI (urinary tract infection)  Pt. was recently hospitalized in [place] two weeks ago tx with ABx

## 2018-02-07 DIAGNOSIS — J69.0 PNEUMONITIS DUE TO INHALATION OF FOOD AND VOMIT: ICD-10-CM

## 2018-02-07 DIAGNOSIS — I35.0 NONRHEUMATIC AORTIC (VALVE) STENOSIS: ICD-10-CM

## 2018-02-07 LAB
ANION GAP SERPL CALC-SCNC: 13 MMOL/L — SIGNIFICANT CHANGE UP (ref 5–17)
BUN SERPL-MCNC: 15 MG/DL — SIGNIFICANT CHANGE UP (ref 7–23)
CALCIUM SERPL-MCNC: 8.8 MG/DL — SIGNIFICANT CHANGE UP (ref 8.4–10.5)
CHLORIDE SERPL-SCNC: 92 MMOL/L — LOW (ref 96–108)
CO2 SERPL-SCNC: 26 MMOL/L — SIGNIFICANT CHANGE UP (ref 22–31)
CREAT SERPL-MCNC: 0.9 MG/DL — SIGNIFICANT CHANGE UP (ref 0.5–1.3)
GLUCOSE SERPL-MCNC: 100 MG/DL — HIGH (ref 70–99)
HCT VFR BLD CALC: 29.2 % — LOW (ref 34.5–45)
HGB BLD-MCNC: 9.8 G/DL — LOW (ref 11.5–15.5)
MAGNESIUM SERPL-MCNC: 1.7 MG/DL — SIGNIFICANT CHANGE UP (ref 1.6–2.6)
MCHC RBC-ENTMCNC: 30.1 PG — SIGNIFICANT CHANGE UP (ref 27–34)
MCHC RBC-ENTMCNC: 33.6 G/DL — SIGNIFICANT CHANGE UP (ref 32–36)
MCV RBC AUTO: 89.6 FL — SIGNIFICANT CHANGE UP (ref 80–100)
PLATELET # BLD AUTO: 211 K/UL — SIGNIFICANT CHANGE UP (ref 150–400)
POTASSIUM SERPL-MCNC: 3.4 MMOL/L — LOW (ref 3.5–5.3)
POTASSIUM SERPL-SCNC: 3.4 MMOL/L — LOW (ref 3.5–5.3)
RAPID RVP RESULT: SIGNIFICANT CHANGE UP
RBC # BLD: 3.26 M/UL — LOW (ref 3.8–5.2)
RBC # FLD: 14.1 % — SIGNIFICANT CHANGE UP (ref 10.3–16.9)
SODIUM SERPL-SCNC: 131 MMOL/L — LOW (ref 135–145)
WBC # BLD: 5.8 K/UL — SIGNIFICANT CHANGE UP (ref 3.8–10.5)
WBC # FLD AUTO: 5.8 K/UL — SIGNIFICANT CHANGE UP (ref 3.8–10.5)

## 2018-02-07 PROCEDURE — 71045 X-RAY EXAM CHEST 1 VIEW: CPT | Mod: 26

## 2018-02-07 PROCEDURE — 99223 1ST HOSP IP/OBS HIGH 75: CPT

## 2018-02-07 PROCEDURE — 71250 CT THORAX DX C-: CPT | Mod: 26

## 2018-02-07 PROCEDURE — 70220 X-RAY EXAM OF SINUSES: CPT | Mod: 26

## 2018-02-07 PROCEDURE — 93010 ELECTROCARDIOGRAM REPORT: CPT

## 2018-02-07 PROCEDURE — 99233 SBSQ HOSP IP/OBS HIGH 50: CPT

## 2018-02-07 RX ORDER — MAGNESIUM OXIDE 400 MG ORAL TABLET 241.3 MG
800 TABLET ORAL ONCE
Qty: 0 | Refills: 0 | Status: COMPLETED | OUTPATIENT
Start: 2018-02-07 | End: 2018-02-07

## 2018-02-07 RX ORDER — AMPICILLIN SODIUM AND SULBACTAM SODIUM 250; 125 MG/ML; MG/ML
3 INJECTION, POWDER, FOR SUSPENSION INTRAMUSCULAR; INTRAVENOUS ONCE
Qty: 0 | Refills: 0 | Status: COMPLETED | OUTPATIENT
Start: 2018-02-07 | End: 2018-02-07

## 2018-02-07 RX ORDER — POTASSIUM CHLORIDE 20 MEQ
40 PACKET (EA) ORAL ONCE
Qty: 0 | Refills: 0 | Status: COMPLETED | OUTPATIENT
Start: 2018-02-07 | End: 2018-02-07

## 2018-02-07 RX ORDER — SODIUM CHLORIDE 9 MG/ML
1000 INJECTION INTRAMUSCULAR; INTRAVENOUS; SUBCUTANEOUS
Qty: 0 | Refills: 0 | Status: DISCONTINUED | OUTPATIENT
Start: 2018-02-07 | End: 2018-02-07

## 2018-02-07 RX ORDER — SODIUM CHLORIDE 9 MG/ML
1000 INJECTION INTRAMUSCULAR; INTRAVENOUS; SUBCUTANEOUS
Qty: 0 | Refills: 0 | Status: DISCONTINUED | OUTPATIENT
Start: 2018-02-07 | End: 2018-02-08

## 2018-02-07 RX ORDER — ACETAMINOPHEN 500 MG
650 TABLET ORAL EVERY 6 HOURS
Qty: 0 | Refills: 0 | Status: DISCONTINUED | OUTPATIENT
Start: 2018-02-07 | End: 2018-02-13

## 2018-02-07 RX ORDER — POTASSIUM CHLORIDE 20 MEQ
40 PACKET (EA) ORAL ONCE
Qty: 0 | Refills: 0 | Status: DISCONTINUED | OUTPATIENT
Start: 2018-02-07 | End: 2018-02-07

## 2018-02-07 RX ORDER — AMPICILLIN SODIUM AND SULBACTAM SODIUM 250; 125 MG/ML; MG/ML
INJECTION, POWDER, FOR SUSPENSION INTRAMUSCULAR; INTRAVENOUS
Qty: 0 | Refills: 0 | Status: DISCONTINUED | OUTPATIENT
Start: 2018-02-07 | End: 2018-02-08

## 2018-02-07 RX ORDER — AMPICILLIN SODIUM AND SULBACTAM SODIUM 250; 125 MG/ML; MG/ML
3 INJECTION, POWDER, FOR SUSPENSION INTRAMUSCULAR; INTRAVENOUS EVERY 12 HOURS
Qty: 0 | Refills: 0 | Status: DISCONTINUED | OUTPATIENT
Start: 2018-02-08 | End: 2018-02-08

## 2018-02-07 RX ADMIN — MEMANTINE HYDROCHLORIDE 5 MILLIGRAM(S): 10 TABLET ORAL at 07:04

## 2018-02-07 RX ADMIN — HEPARIN SODIUM 5000 UNIT(S): 5000 INJECTION INTRAVENOUS; SUBCUTANEOUS at 21:55

## 2018-02-07 RX ADMIN — MAGNESIUM OXIDE 400 MG ORAL TABLET 800 MILLIGRAM(S): 241.3 TABLET ORAL at 10:08

## 2018-02-07 RX ADMIN — ATORVASTATIN CALCIUM 10 MILLIGRAM(S): 80 TABLET, FILM COATED ORAL at 21:55

## 2018-02-07 RX ADMIN — Medication 81 MILLIGRAM(S): at 12:29

## 2018-02-07 RX ADMIN — Medication 40 MILLIEQUIVALENT(S): at 10:08

## 2018-02-07 RX ADMIN — HEPARIN SODIUM 5000 UNIT(S): 5000 INJECTION INTRAVENOUS; SUBCUTANEOUS at 14:56

## 2018-02-07 RX ADMIN — AMPICILLIN SODIUM AND SULBACTAM SODIUM 200 GRAM(S): 250; 125 INJECTION, POWDER, FOR SUSPENSION INTRAMUSCULAR; INTRAVENOUS at 13:14

## 2018-02-07 RX ADMIN — SODIUM CHLORIDE 75 MILLILITER(S): 9 INJECTION INTRAMUSCULAR; INTRAVENOUS; SUBCUTANEOUS at 10:19

## 2018-02-07 RX ADMIN — PANTOPRAZOLE SODIUM 40 MILLIGRAM(S): 20 TABLET, DELAYED RELEASE ORAL at 07:03

## 2018-02-07 RX ADMIN — MEMANTINE HYDROCHLORIDE 5 MILLIGRAM(S): 10 TABLET ORAL at 18:36

## 2018-02-07 RX ADMIN — Medication 1000 UNIT(S): at 12:29

## 2018-02-07 RX ADMIN — LOSARTAN POTASSIUM 100 MILLIGRAM(S): 100 TABLET, FILM COATED ORAL at 07:03

## 2018-02-07 RX ADMIN — HEPARIN SODIUM 5000 UNIT(S): 5000 INJECTION INTRAVENOUS; SUBCUTANEOUS at 07:03

## 2018-02-07 RX ADMIN — SODIUM CHLORIDE 40 MILLILITER(S): 9 INJECTION INTRAMUSCULAR; INTRAVENOUS; SUBCUTANEOUS at 11:30

## 2018-02-07 RX ADMIN — Medication 650 MILLIGRAM(S): at 16:51

## 2018-02-07 NOTE — PROGRESS NOTE ADULT - PROBLEM SELECTOR PLAN 3
Dr. Cates  - Na 126, K 3.8.. with h/o decreased PO intake over the past few days   - s/p 1 liter of fluid  - running NS 75 cc over 8 hours   - repeat BMP ordered, serum osm, cortisol, urine lytes  - HOLDING HCTZ per Dr. Cates Dr. Cates  - Na 126, K 3.8 with h/o decreased PO intake over the past few days. Repeat Na 131 on 2/7 AM after 1L IVF. Continue hydration with NS 40cc/hr x 6 hours  - repeat BMP ordered, serum osm, cortisol, urine lytes  - HOLDING HCTZ per Dr. Cates. This was likely cause of hyponatremia.

## 2018-02-07 NOTE — CONSULT NOTE ADULT - SUBJECTIVE AND OBJECTIVE BOX
CHIEF COMPLAINT: AMS    HISTORY OF PRESENT ILLNESS: Patient is demented, history provided by her family member ( daughter) who is by the bedside and chart.   89 yo F with history of HTN, HLD, dementia, paroxysmal atrial fibrillation, s/p CVA was brought to ED for evaluation of her altered mental status, lethargy and decreased po intake. Prior to hospitalization patient had syncopal episode and was evaluated with Holter monitor, which showed sinus bradycardia with HR in the 40s.  No correlating symptoms at the time of monitoring.   EPS called to evaluate for PPM.      PAST MEDICAL & SURGICAL HISTORY:  AS (aortic stenosis)  Atrial fibrillation: Pt. had new onset of Afib two weeks ago while hospitalized for UTI and Sinusitis.  No AC.  Pt. currently SR  Sinusitis  UTI (urinary tract infection): Pt. was recently hospitalized in [place] two weeks ago tx with ABx  Essential hypertension: HTN (hypertension)  Cerebral artery occlusion with cerebral infarction: CVA (cerebral infarction)  Hyperlipidemia: HLD (hyperlipidemia)  Memory loss: Memory loss  Fall: Falls  No significant past surgical history      PERTINENT DIAGNOSTIC TESTING:    [ ] Echocardiogram: < from: Echocardiogram (01.16.18 @ 13:44) >  Normal left ventricular size and wall thickness.The left ventricular wall   motion is normal.The left ventricular ejection fraction is estimated to   be   60-65%The left atrial size is normal.Right atrial size is normal.The   right   ventricle is mildly dilated.Structurally normal mitral valve.No mitral   regurgitation noted.Structurally normal tricuspid valve.There is no   echocardiographic evidence for pulmonary hypertension.Structurally normal   pulmonic valve.There is trace pulmonic regurgitation.No aortic root   dilatation.The inferior vena cava is normal in size (<2.1 cm) with normal   inspiratory collapse (>50%) consistent with normal right atrial pressure.   There is no pericardial effusion.      Allergies    doxycycline (Unknown)  	    MEDICATIONS:  losartan 100 milliGRAM(s) Oral daily  ampicillin/sulbactam  IVPB      memantine 5 milliGRAM(s) Oral two times a day  pantoprazole    Tablet 40 milliGRAM(s) Oral before breakfast  atorvastatin 10 milliGRAM(s) Oral at bedtime  aspirin enteric coated 81 milliGRAM(s) Oral daily  cholecalciferol 1000 Unit(s) Oral daily  heparin  Injectable 5000 Unit(s) SubCutaneous every 8 hours  sodium chloride 0.9%. 1000 milliLiter(s) IV Continuous <Continuous>      FAMILY HISTORY:      SOCIAL HISTORY:    [x Non-smoker      REVIEW OF SYSTEMS:    CONSTITUTIONAL: No fever, weight loss, or fatigue  EYES: No eye pain, visual disturbances, or discharge  ENMT:  No difficulty hearing, tinnitus, vertigo; No sinus or throat pain  NECK: No pain or stiffness  RESPIRATORY: No cough, wheezing, chills or hemoptysis; No Shortness of Breath  CARDIOVASCULAR: No chest pain, palpitations, dizziness, or leg swelling  GASTROINTESTINAL: No abdominal or epigastric pain. No nausea, vomiting, or hematemesis; No diarrhea or constipation. No melena or hematochezia.  GENITOURINARY: No dysuria, frequency, hematuria, or incontinence    [x ] Unable to obtain. Patient is demented, history provided by her family ( daughter who is by the bedside    PHYSICAL EXAM:  T(C): 37.9 (02-07-18 @ 05:37), Max: 37.9 (02-07-18 @ 05:37)  HR: 70 (02-07-18 @ 09:24) (63 - 82)  BP: 126/60 (02-07-18 @ 09:24) (118/62 - 152/68)  RR: 16 (02-07-18 @ 09:24) (16 - 18)  SpO2: 94% (02-07-18 @ 09:24) (94% - 99%)  Wt(kg): --  I&O's Summary      TELEMETRY: 	sinus rhythm     ECG: < from: 12 Lead ECG (02.06.18 @ 11:45) >  Ventricular Rate 61 BPM    Atrial Rate 115 BPM    QRS Duration 72 ms    Q-T Interval 406 ms    QTC Calculation(Bezet) 408 ms    R Axis 16 degrees    T Axis 12 degrees    Diagnosis Line Normal sinus rhythm  EKG within normal limits  Confirmed by RACHAEL PUENTES NEIL ([id number]) on 2/6/2018 3:46:08 PM    	  HEENT:   PERRL, EOMI	  Cardiovascular:  S1 S2, no edema  Respiratory: Lungs clear to auscultation	  Gastrointestinal:  Soft, Non-tender, + BS	  Neurologic: A&O x 1  Extremities: no edema    	  LABS:	 	    CARDIAC MARKERS:                          9.8    5.8   )-----------( 211      ( 07 Feb 2018 06:02 )             29.2     02-07    131<L>  |  92<L>  |  15  ----------------------------<  100<H>  3.4<L>   |  26  |  0.90    Ca    8.8      07 Feb 2018 06:02  Mg     1.7     02-07    TPro  7.0  /  Alb  3.5  /  TBili  0.4  /  DBili  x   /  AST  30  /  ALT  16  /  AlkPhos  69  02-06    proBNP:   Lipid Profile:   HgA1c:   TSH:     ASSESSMENT/PLAN: 	  89 yo F with history of HTN, HLD, dementia, paroxysmal atrial fibrillation, s/p CVA admitted  for evaluation of her altered mental status, lethargy and decreased po intake. Episode of sinus bradycardia on outpatient Holter monitor.   Will monitor telemetry for episodes of bradycardia, correct electrolyte imbalance.   Would consider starting anticoagulation,  if there are no contraindications.

## 2018-02-07 NOTE — PROGRESS NOTE ADULT - PROBLEM SELECTOR PLAN 4
- Normotensive currently, monitor.  - Losartan with holding parameters, holding HCTZ 2/2 hyponatremia Dr Bernabe consulted  - pt has cough x 4 days. CXR positive for right hilar infiltrate concerning for aspiration pneumonia.   - Unasyn 3mg BID and Levaquin 750mg every 48 hours started  - Sputum culture ordered  - Legionella ordered Dr Bernabe consulted  - pt has cough x 4 days. CXR positive for right hilar infiltrate concerning for aspiration pneumonia.   - Unasyn 3mg BID and Levaquin 750mg every 48 hours started  - Temp 101 430pm, relieved with Tylenol 650mg x 1.   - Sputum culture ordered, CT chest noncontrast performed. Xray sinuses ordered.   - Legionella ordered  - Speech and swallow cleared patient for regular diet.   - Aspiration precautions ordered

## 2018-02-07 NOTE — SWALLOW BEDSIDE ASSESSMENT ADULT - SWALLOW EVAL: RECOMMENDED FEEDING/EATING TECHNIQUES
position upright (90 degrees)/small sips/bites/maintain upright posture during/after eating for 30 mins

## 2018-02-07 NOTE — DIETITIAN INITIAL EVALUATION ADULT. - PROBLEM SELECTOR PLAN 3
Dr. Cates  - Na 126, K 3.8.. with h/o decreased PO intake over the past few days   - s/p 1 liter of fluid  - running NS 75 cc over 8 hours   - repeat BMP ordered, serum osm, cortisol, urine lytes  - HOLDING HCTZ per Dr. Cates

## 2018-02-07 NOTE — PROGRESS NOTE ADULT - ASSESSMENT
ASSESSMENT/PLAN 89y/o female pt c respiratory insufficiency, pneumonia, possible aspiration, As,AF, hypoNa    1. O2 use 2LNC humidified art this time  2. Bronchodilators:  Atrovent/ albuterol q 4 – 6 hours as needed  3. Corticosteroids: off  4. ID/Antibiotics: Unasyn, Levaquin concern for legionella, sputum cx, Legionella Ag, BCX,UCX  5. Cardiac/HTN: optimize HR  6. GI: Rx/ prophylaxis c PPI/H2B  7. Heme: Rx/VT prophylaxis c SQH/SCD/AC  8. Aspiration precautions at all times  9.obtain CT chest  Discussed with managing team ASSESSMENT/PLAN 89y/o female pt c respiratory insufficiency, pneumonia, possible aspiration, As,AF, hypoNa    1. O2 use 2LNC humidified art this time  2. Bronchodilators:  Atrovent/ albuterol q 4 – 6 hours as needed  3. Corticosteroids: off  4. ID/Antibiotics: Unasyn, Levaquin concern for legionella, sputum cx, Legionella Ag, BCX,UCX, RVP  5. Cardiac/HTN: optimize HR  6. GI: Rx/ prophylaxis c PPI/H2B  7. Heme: Rx/VT prophylaxis c SQH/SCD/AC  8. Aspiration precautions at all times  9.obtain CT chest  Discussed with managing team

## 2018-02-07 NOTE — PHYSICAL THERAPY INITIAL EVALUATION ADULT - SENSORY TESTS
Impairments found: strength, impaired ability to follow commands, gait/locomotion/balance. Therapy frequency 2-3x/week Planned interventions: strength and gait Recommended discharge: Impairments found: strength, impaired ability to follow commands, gait/locomotion/balance. Therapy frequency 2-3x/week Planned interventions: strength and gait Recommended discharge: home with continued 24/7 assistance Impairments found: strength, impaired ability to follow commands, gait/locomotion/balance. Therapy frequency 2-3x/week Planned interventions: strength and gait Recommended discharge: outpatient PT

## 2018-02-07 NOTE — PROGRESS NOTE ADULT - PROBLEM SELECTOR PLAN 1
Change in Functional Status (Dr. Mcrae)  - patient apparently found at home acutely lethargic... has h/o syncopal episode with negative work up 1/16/18 @ [facility]  - CT head/ CTA head with no abnormalities, Echo NL.  - TSH WNL  - Carotid US no HD sig stenosis.   - F/U neurology recommendations  - Continue dementia medication -- Namenda, Galantamine  - PT consult  - enhanced supervision/ fall risk protocol Change in Functional Status (Dr. Mcrae)  - patient apparently found at home acutely lethargic, likely 2/2 hyponatremia and aspiration pneumonia. UA shows blood but no RBCs (likely rhabdomyolysis which was tx with IVF).   as h/o syncopal episode with negative work up 1/16/18 @ [facility]  - CT head negative on this admission. Echo NL.  - TSH WNL  - Carotid US no HD sig stenosis.   - Dr Mcrae has no further neuro recommendations  - Continue dementia medication -- Namenda, Galantamine  - PT consult recommends home with outpatient physical therapy. Script in front of chart.   - enhanced supervision/ fall risk protocol

## 2018-02-07 NOTE — DIETITIAN INITIAL EVALUATION ADULT. - PROBLEM SELECTOR PLAN 2
Concern for SSS, EP (service)   - Holter monitor @ home with eb to 40s  - no runs of afib noted  - concern for possible SSS  - Echo 1/16/18 with NL EF 60-65%, NL wall motion, No HD sig valvular disease  - continue tele, replete lytes  - R/o ACS. trop neg x1.

## 2018-02-07 NOTE — DIETITIAN INITIAL EVALUATION ADULT. - PROBLEM SELECTOR PLAN 4
- Normotensive currently, monitor.  - Losartan with holding parameters, holding HCTZ 2/2 hyponatremia

## 2018-02-07 NOTE — SWALLOW BEDSIDE ASSESSMENT ADULT - SWALLOW EVAL: FEEDING ASSISTANCE
minimal assistance required/Pt benefits from physical assist with meals due to poor hand-to-mouth coordination.

## 2018-02-07 NOTE — PROGRESS NOTE ADULT - SUBJECTIVE AND OBJECTIVE BOX
Neurology Follow up note    Name  SAVANAH ISAAC    HPI:  89 y/o active female (lives alone w/24h HHA) w/ PMHx of HTN, HLD, Dementia (A&Ox1), hx Cerebral Artery Occlusion, pAfib (no AC, pt. new onset ~1mo ago 2/2 to UTI/Pyelo in [place]; currently SR) presented to [facility] ED 1/16 after change in functional status / lethargy per family. Pt's son stated that upon arriving to her apt this AM to take her to breakfast, she was slumped over and lethargic. Normally she walks on her own, but she needed her son and daughter-in-law to help her down the stairs. The aid reported that she had a decreased PO intake over the past few days but no new symptoms or complaints aside from mild back pain since her last admission to [facility] for syncope work up. Denied fever, chills, recent illness, sick contacts, chest pain, SOB, HAYDEN, orthopnea, LE edema, n/v/d, abd pain, dysuria, hematuria. Pt herself denies any symptoms however A&Ox1. Of note, patient was recently hospitalized December 2017 in [place], [place] for UTI/Pyelo and sinusitis and treated with IV abx, and upon return to [place], had a syncopal episode 1/15 and was admitted to [facility] with a negative work up including CTA brain, Echo 1/16 revealing NL LV wall motion, LVEF NL 60-65%, No AS. Carotid US revealed mild-mod dz, TSH WNL, and UA repeated which was negative for UTI and urine culture negative. Pt was given a Holter Monitor otpt by Dr. Stahl which revealed no atrial fibrillation, but bradycardia to 40s. In the ED today VSS, afebrile, ECG NSR @ 61 BPM no acute STT changes, CT Head unchanged, labs sig for troponin negative x1, Na 126, K 3.8. Pt will be admitted to [facility] for further monitoring, EP consult, and work up for possible sick sinus syndrome. (06 Feb 2018 13:03)      Interval History - no new change in neuro status - awake        REVIEW OF SYSTEMS    Vital Signs Last 24 Hrs  T(C): 37.9 (07 Feb 2018 05:37), Max: 37.9 (07 Feb 2018 05:37)  T(F): 100.3 (07 Feb 2018 05:37), Max: 100.3 (07 Feb 2018 05:37)  HR: 69 (07 Feb 2018 07:02) (61 - 98)  BP: 131/84 (07 Feb 2018 07:02) (118/59 - 152/68)  BP(mean): --  RR: 16 (07 Feb 2018 07:02) (16 - 18)  SpO2: 94% (07 Feb 2018 07:02) (94% - 99%)    Physical Exam-     Mental Status- awake and responsive    Cranial Nerves- full EOM    Gait and station-n/a    Motor- moves all 4 extremities    Reflexes- decreased    Sensation-no sensory level    Coordination-no tremors    Vascular -no bruits    Medications  aspirin enteric coated 81 milliGRAM(s) Oral daily  atorvastatin 10 milliGRAM(s) Oral at bedtime  cholecalciferol 1000 Unit(s) Oral daily  heparin  Injectable 5000 Unit(s) SubCutaneous every 8 hours  losartan 100 milliGRAM(s) Oral daily  magnesium oxide 800 milliGRAM(s) Oral once  memantine 5 milliGRAM(s) Oral two times a day  pantoprazole    Tablet 40 milliGRAM(s) Oral before breakfast  potassium chloride   Powder 40 milliEquivalent(s) Oral once  sodium chloride 0.9%. 1000 milliLiter(s) IV Continuous <Continuous>      Lab      Radiology    Assessment- Mental status change     Plan no neuro w/o needed

## 2018-02-07 NOTE — DIETITIAN INITIAL EVALUATION ADULT. - ENERGY NEEDS
Height 62"; #; #; 115%IBW  BMI 23  ABW used for calculations as pt between % of IBW. Needs adjusted per age

## 2018-02-07 NOTE — PHYSICAL THERAPY INITIAL EVALUATION ADULT - ACTIVE RANGE OF MOTION EXAMINATION, REHAB EVAL
assessed throughout mobility/bilateral  lower extremity Active ROM was WFL (within functional limits)/bilateral upper extremity Active ROM was WFL (within functional limits)

## 2018-02-07 NOTE — DIETITIAN INITIAL EVALUATION ADULT. - OTHER INFO
89 yo/female with PMHx HTN, HLD, dementia, afib, admitted with lethargy, decreased PO intake, possible SSS. Pt seen in room, awake, alert, but intermittently confused. Daughter at bedside. Pt w/good intake PTA, well-balanced diet, until 1 day PTA w/decreased intake 2/2 lethargy. Currently w/good intake as well-~60% intake reported by daughter. No N/V/C/D reported at this time. NKFA. Pt cleared by SLP on 2/7 for regular texture w/regular liquids, as long as patient is alert while eating. Skin intact, jillian 15. Endorsed possibly trying ONS, pt and daughter agreeable.

## 2018-02-07 NOTE — SWALLOW BEDSIDE ASSESSMENT ADULT - COMMENTS
Received awake, but sleepy. Dtr-in-law present at bedside and reports that Pt had been feeding herself and indep PTA. Pt makes basic wants/needs known, but follows simple commands inconsistently and responses to simple questions are tangential/unrelated to topic. For example, when asked, "Do you want potatoes or soup?" the patient responded, "It's the bed."

## 2018-02-07 NOTE — PROGRESS NOTE ADULT - SUBJECTIVE AND OBJECTIVE BOX
I examined the patient today, reviewed the lab data, xrays and additional studies. Additionally I have reviewed the notes and assessments by the residents and consultants.   At this point I agree with the assessments and plan.  I would also advise close observation, and supportive care.  Check new chest xray   also dropping HB   needs anemia work yp

## 2018-02-07 NOTE — PROGRESS NOTE ADULT - SUBJECTIVE AND OBJECTIVE BOX
Interventional, Pulmonary, Critical, Chest Special Procedures.    Pt was seen and fully examined by myself.     Time spent with patient in minutes:77    Patient is a 89y old  Female who presents with a chief complaint of AMS. Events leading to this admission reviewed. The patient now ill appearing, somnolent, arousable, following commands, tangential. Inspiration triggered cough.  HPI:  89 y/o active female (lives alone w/24h HHA) w/ PMHx of HTN, HLD, Dementia (A&Ox1), hx Cerebral Artery Occlusion, pAfib (no AC, pt. new onset ~1mo ago 2/2 to UTI/Pyelo in [place]; currently SR) presented to [facility] ED 1/16 after change in functional status / lethargy per family. Pt's son stated that upon arriving to her apt this AM to take her to breakfast, she was slumped over and lethargic. Normally she walks on her own, but she needed her son and daughter-in-law to help her down the stairs. The aid reported that she had a decreased PO intake over the past few days but no new symptoms or complaints aside from mild back pain since her last admission to [facility] for syncope work up. Denied fever, chills, recent illness, sick contacts, chest pain, SOB, HAYDEN, orthopnea, LE edema, n/v/d, abd pain, dysuria, hematuria. Pt herself denies any symptoms however A&Ox1. Of note, patient was recently hospitalized December 2017 in [place], [place] for UTI/Pyelo and sinusitis and treated with IV abx, and upon return to [place], had a syncopal episode 1/15 and was admitted to [facility] with a negative work up including CTA brain, Echo 1/16 revealing NL LV wall motion, LVEF NL 60-65%, No AS. Carotid US revealed mild-mod dz, TSH WNL, and UA repeated which was negative for UTI and urine culture negative. Pt was given a Holter Monitor otpt by Dr. Stahl which revealed no atrial fibrillation, but bradycardia to 40s. In the ED today VSS, afebrile, ECG NSR @ 61 BPM no acute STT changes, CT Head unchanged, labs sig for troponin negative x1, Na 126, K 3.8. Pt will be admitted to [facility] for further monitoring, EP consult, and work up for possible sick sinus syndrome. (06 Feb 2018 13:03)    REVIEW OF SYSTEMS:  as above  possibiliy of aspiration as per family member  Recent stool incontinence    PAST MEDICAL & SURGICAL HISTORY:  AS (aortic stenosis)  Atrial fibrillation: Pt. had new onset of Afib two weeks ago while hospitalized for UTI and Sinusitis.  No AC.  Pt. currently SR  Sinusitis  UTI (urinary tract infection): Pt. was recently hospitalized in [place] two weeks ago tx with ABx  Essential hypertension: HTN (hypertension)  Cerebral artery occlusion with cerebral infarction: CVA (cerebral infarction)  Hyperlipidemia: HLD (hyperlipidemia)  Memory loss: Memory loss  Fall: Falls  No significant past surgical history    FAMILY HISTORY:    SOCIAL HISTORY:      - Tobacco     - ETOH    Allergies    doxycycline (Unknown)    Intolerances      Vital Signs Last 24 Hrs  T(C): 37.9 (07 Feb 2018 05:37), Max: 37.9 (07 Feb 2018 05:37)  T(F): 100.3 (07 Feb 2018 05:37), Max: 100.3 (07 Feb 2018 05:37)  HR: 70 (07 Feb 2018 09:24) (63 - 82)  BP: 126/60 (07 Feb 2018 09:24) (118/62 - 152/68)  BP(mean): --  RR: 16 (07 Feb 2018 09:24) (16 - 18)  SpO2: 94% (07 Feb 2018 09:24) (94% - 99%)      PHYSICAL EXAM:  Un Comfortable, cough induced distress  Eyes: PERRL, EOM intact; conjunctiva and sclera clear  Head: Normocephalic;  No Trauma  ENMT: +nasal discharge, hoarseness, cough no  hemoptysis.    Neck: Supple; non tender; no masses or deformities.    No JVD  Respiratory:  - WHEEZING   + R RHONCHI  - RALES  + CRACKLES.  Diminished breath sounds  BILATERAL  RIGHT > LEFT base  Cardiovascular: IRRegular rate and rhythm. S1 and S2 Normal; No murmurs, gallops or rubs     - PPM/AICD  Gastrointestinal: Soft non-tender, non-distended; Normal bowel sounds; No hepatosplenomegaly.     -PEG    -  GT   - SHEPARD  Genitourinary: No costovertebral angle tenderness. No dysuria  Extremities: AROM, No clubbing, cyanosis or edema    Vascular: Peripheral pulses palpable 2+ bilaterally  Neurological: somnolent  and responisve to stimuli   Skin: Warm and dry. No obvious rash  Lymph Nodes: No acute cervical or supraclavicular adenopathy  Psychiatric: Cooperative and appropriate mood, tangential    DEVICES:  - DENTURES   +IV R / L     - ETUBE   -TRACH   -CTUBE  R / L      LABS:                          9.8    5.8   )-----------( 211      ( 07 Feb 2018 06:02 )             29.2     02-07    131<L>  |  92<L>  |  15  ----------------------------<  100<H>  3.4<L>   |  26  |  0.90    Ca    8.8      07 Feb 2018 06:02  Mg     1.7     02-07    TPro  7.0  /  Alb  3.5  /  TBili  0.4  /  DBili  x   /  AST  30  /  ALT  16  /  AlkPhos  69  02-06      < from: CT Head No Cont (02.06.18 @ 11:37) >    EXAM:  CT BRAIN                          PROCEDURE DATE:  02/06/2018                     INTERPRETATION:  IJoey MD, have reviewed the images and   the report and agree with the findings.    RESIDENT PRELIMINARY REPORT    PROCEDURE:CT head without intravenous contrast    INDICATIONS: Altered mental status and inability to walk unassisted.    TECHNIQUE:  Serial axial images were obtained from the skull base to the   vertex without the use of intravenous contrast. Imaging is performed   using helical low-dose technique, and sagittal and coronal reformations   are provided.    COMPARISON EXAMINATION: None.    FINDINGS:    VENTRICLES AND SULCI: There is age-appropriate parenchymal volume loss   with prominence of the ventricles, likely secondary to ex vacuo   dilatation. No definite hydrocephalus.  INTRA-AXIAL: No acute intracranial hemorrhage or midline shift is   present. No acute transcortical infarct. There are patchy periventricular   hypodensities, which is nonspecific, and may represent the sequela of   chronic microangiopathic ischemic disease.  EXTRA-AXIAL: No extra-axial fluid collection is present.   VISUALIZED SINUSES: The visualized paranasal sinuses are predominantly   clear.  VISUALIZED MASTOIDS:  Clear.  CALVARIUM:  Normal.  MISCELLANEOUS:  None.    IMPRESSION:    1.  No acute intracranial hemorrhage or acute transcortical  2.  Age-appropriate volume loss with ex vacuo dilatation of the   ventricles.      < end of copied text >  RADIOLOGY & ADDITIONAL STUDIES (The following images were personally reviewed):CXR series

## 2018-02-07 NOTE — PROGRESS NOTE ADULT - ASSESSMENT
89 y/o active female (lives alone w/24h HHA) w/ PMHx of HTN, HLD, Dementia (A&Ox1), hx Cerebral Artery Occlusion, pAfib (no AC, pt. new onset ~1mo ago 2/2 to UTI/Pyelo in [place]; currently SR) presented to [facility] ED 1/16 after change in functional status / lethargy per family. She was recently admitted to [facility] for syncopal work up which was negative. Patient will be admitted for monitoring overnight, IV hydration, and EP consult for possible SSS.

## 2018-02-07 NOTE — SWALLOW BEDSIDE ASSESSMENT ADULT - SWALLOW EVAL: DIAGNOSIS
Functional oropharyngeal swallow with no overt signs of aspiration or other swallowing impairment at this time.

## 2018-02-07 NOTE — DIETITIAN INITIAL EVALUATION ADULT. - PROBLEM SELECTOR PLAN 1
Change in Functional Status (Dr. Mcrae)  - patient apparently found at home acutely lethargic... has h/o syncopal episode with negative work up 1/16/18 @ [facility]  - CT head/ CTA head with no abnormalities, Echo NL.  - TSH WNL  - Carotid US no HD sig stenosis.   - F/U neurology recommendations  - Continue dementia medication -- Namenda, Galantamine  - PT consult  - enhanced supervision/ fall risk protocol

## 2018-02-07 NOTE — CONSULT NOTE ADULT - ASSESSMENT
89 yo lady with generally preserved capabilities-- lives at home with home help, has had uti and syncope over past months.  will continue cautious saline at decreased rate, and pt to eat and drink in response to thirst without arbitray supplelmental free water.   na up to 131, continue current rx.  chest exam-- few rales at left base , would repeat cxr and chk sinus films.

## 2018-02-07 NOTE — SWALLOW BEDSIDE ASSESSMENT ADULT - PHARYNGEAL PHASE
Hyolaryngeal excursion appears WFL and swallow trigger is brisk & timely to palpation./Within functional limits

## 2018-02-07 NOTE — CONSULT NOTE ADULT - SUBJECTIVE AND OBJECTIVE BOX
HPI:  89 y/o active female (lives alone w/24h HHA) w/ PMHx of HTN, HLD, Dementia (A&Ox1), hx Cerebral Artery Occlusion, pAfib (no AC, pt. new onset ~1mo ago 2/2 to UTI/Pyelo in [place]; currently SR) presented to [facility] ED 1/16 after change in functional status / lethargy per family. Pt's son stated that upon arriving to her apt this AM to take her to breakfast, she was slumped over and lethargic. Normally she walks on her own, but she needed her son and daughter-in-law to help her down the stairs. The aid reported that she had a decreased PO intake over the past few days but no new symptoms or complaints aside from mild back pain since her last admission to [facility] for syncope work up. Denied fever, chills, recent illness, sick contacts, chest pain, SOB, HAYDEN, orthopnea, LE edema, n/v/d, abd pain, dysuria, hematuria. Pt herself denies any symptoms however A&Ox1. Of note, patient was recently hospitalized December 2017 in [place], [place] for UTI/Pyelo and sinusitis and treated with IV abx, and upon return to [place], had a syncopal episode 1/15 and was admitted to [facility] with a negative work up including CTA brain, Echo 1/16 revealing NL LV wall motion, LVEF NL 60-65%, No AS. Carotid US revealed mild-mod dz, TSH WNL, and UA repeated which was negative for UTI and urine culture negative. Pt was given a Holter Monitor otpt by Dr. Stahl which revealed no atrial fibrillation, but bradycardia to 40s. In the ED today VSS, afebrile, ECG NSR @ 61 BPM no acute STT changes, CT Head unchanged, labs sig for troponin negative x1, Na 126, K 3.8. Pt will be admitted to [facility] for further monitoring, EP consult, and work up for possible sick sinus syndrome. (06 Feb 2018 13:03)      PAST MEDICAL & SURGICAL HISTORY:  AS (aortic stenosis)  Atrial fibrillation: Pt. had new onset of Afib two weeks ago while hospitalized for UTI and Sinusitis.  No AC.  Pt. currently SR  Sinusitis  UTI (urinary tract infection): Pt. was recently hospitalized in [place] two weeks ago tx with ABx  Essential hypertension: HTN (hypertension)  Cerebral artery occlusion with cerebral infarction: CVA (cerebral infarction)  Hyperlipidemia: HLD (hyperlipidemia)  Memory loss: Memory loss  Fall: Falls  No significant past surgical history      MEDICATIONS:  acetaminophen   Tablet 650 milliGRAM(s) Oral every 6 hours PRN  ampicillin/sulbactam  IVPB      aspirin enteric coated 81 milliGRAM(s) Oral daily  atorvastatin 10 milliGRAM(s) Oral at bedtime  cholecalciferol 1000 Unit(s) Oral daily  heparin  Injectable 5000 Unit(s) SubCutaneous every 8 hours  levoFLOXacin  Tablet 750 milliGRAM(s) Oral every 48 hours  losartan 100 milliGRAM(s) Oral daily  memantine 5 milliGRAM(s) Oral two times a day  pantoprazole    Tablet 40 milliGRAM(s) Oral before breakfast  sodium chloride 0.9%. 1000 milliLiter(s) IV Continuous <Continuous>          SOCIAL HISTORY:    REVIEW OF SYSTEMS:  Constitutional: No fevers.   Card: No chest pain.   Resp: No SOB.  GI: No nausea or vomiting. No abdominal pain.  : No dysuria. Neuro: No focal weakness or sensory loss.  Eyes: No visual symptoms. MS: No joint swelling.  Skin: No rashes. Psych: No psychosis.    PHYSICAL EXAM:    02-07 @ 07:01  -  02-07 @ 23:28  --------------------------------------------------------  IN: 240 mL / OUT: 0 mL / NET: 240 mL      Constitutional: T(C): 37 (02-07-18 @ 21:56), Max: 38.3 (02-07-18 @ 16:32)  HR: 78 (02-07-18 @ 18:43) (69 - 82)  BP: 105/68 (02-07-18 @ 18:43) (105/68 - 133/65)  RR: 16 (02-07-18 @ 18:43) (16 - 16)  SpO2: 96% (02-07-18 @ 18:43) (94% - 97%)  Wt(kg): --  Appearance: Alert, pleasant, INAD.  Eyes: Conjunctivae pink, moist. Pupils equal and reactive.  ENT: External ears/nose normal appearing. Lips normal, dentition good.  Lymphatic: No cervical lymphadenopathy. No supraclavicular lymphadenopathy.  Cardiac: No rubs. NO MURMURS. Extremities: NO LE EDEMA.  Respiratory: Effort no accessory muscle use. Lungs: CLEAR TO AUSCULTATION.  Abdomen: Soft. No masses. Nontender. Liver: Not palpable. Spleen: Not palpable.  Musculoskeltal digits: No clubbing or cyanosis. Tone normal. Moves all four extremities.  Skin inspection: No rashes. Palpation: No abnormalities.  Neuro: Cranial nerves: no facial assymetry or deficits noted. Sensation: LE intact to light touch.  Psych: Oriented to person, place, situation. Mood/affect: Not depressed.     DATA:  131<L>  |  92<L>  |  15     ----------------------------<  100<H>  Ca:8.8   (07 Feb 2018 06:02)  3.4<L>   |  26     |  0.90       eGFR if Non : 56 <L>  eGFR if : 65    TPro  7.0    /  Alb  3.5    /  TBili  0.4    /  DBili  x      /  AST  30     /  ALT  16     /  AlkPhos  69     06 Feb 2018 11:04                        9.8<L>  5.8   )-----------( 211      ( 07 Feb 2018 06:02 )             29.2<L>    Urinalysis Basic - ( 06 Feb 2018 15:27 )  Color: Yellow / Appearance: Clear / SG: <=1.005 / pH: x  Gluc: x / Ketone: NEGATIVE  / Bili: Negative / Urobili: 0.2 E.U./dL   Blood: x / Protein: NEGATIVE mg/dL / Nitrite: NEGATIVE   Leuk Esterase: Small<!!> / RBC: < 5 /HPF / WBC < 5 /HPF   Sq Epi: x / Non Sq Epi: 0-5 /HPF / Bacteria: Present /HPF<!!>

## 2018-02-07 NOTE — PROGRESS NOTE ADULT - PROBLEM SELECTOR PLAN 2
Concern for SSS, EP (service)   - Holter monitor @ home with eb to 40s  - no runs of afib noted  - concern for possible SSS  - Echo 1/16/18 with NL EF 60-65%, NL wall motion, No HD sig valvular disease  - continue tele, replete lytes  - R/o ACS. trop neg x1. Concern for SSS, EP (service)   - Holter monitor @ home with eb to 40s, no runs of afib noted  - Echo 1/16/18 with NL EF 60-65%, NL wall motion, No HD sig valvular disease  - Tele shows NSR 60s-70s, no pauses or afib while at [facility]  - R/o ACS. trop neg x3. f/u EP recommendations. Note mentions wanting to start AC, please discuss further

## 2018-02-07 NOTE — PHYSICAL THERAPY INITIAL EVALUATION ADULT - PERTINENT HX OF CURRENT PROBLEM, REHAB EVAL
89 y/o active female (lives alone w/24h HHA) w/ PMHx of HTN, HLD, Dementia (A&Ox1), hx Cerebral Artery Occlusion, pAfib (no AC, pt. new onset ~1mo ago 2/2 to UTI/Pyelo in [place]; currently SR) presented to [facility] ED 1/16 after change in functional status / lethargy per family. She was recently admitted to [facility] for syncopal work up which was negative. Patient will be admitted for monitoring overnight, IV hydration, and EP consult for possible SSS.

## 2018-02-07 NOTE — PROGRESS NOTE ADULT - SUBJECTIVE AND OBJECTIVE BOX
Interventional Cardiology PA Adult Progress Note    Chief complaint: cough, tiredness.   Subjective Assessment: endorsing cough and fatigue x 4 days. Denies chest pain, sob, dizziness, palpitations, nausea.     12 point review of systems  otherwise negative except for subjective and HPI.     MEDICATIONS:  losartan 100 milliGRAM(s) Oral daily  ampicillin/sulbactam  IVPB      levoFLOXacin  Tablet 750 milliGRAM(s) Oral every 48 hours  acetaminophen   Tablet 650 milliGRAM(s) Oral every 6 hours PRN  memantine 5 milliGRAM(s) Oral two times a day  pantoprazole    Tablet 40 milliGRAM(s) Oral before breakfast  atorvastatin 10 milliGRAM(s) Oral at bedtime  aspirin enteric coated 81 milliGRAM(s) Oral daily  cholecalciferol 1000 Unit(s) Oral daily  heparin  Injectable 5000 Unit(s) SubCutaneous every 8 hours  sodium chloride 0.9%. 1000 milliLiter(s) IV Continuous <Continuous>    PHYSICAL EXAM:  TELEMETRY:   T(C): 38.3 (02-07-18 @ 16:32), Max: 38.3 (02-07-18 @ 16:32)  HR: 75 (02-07-18 @ 12:33) (63 - 82)  BP: 118/63 (02-07-18 @ 12:33) (118/62 - 152/68)  RR: 16 (02-07-18 @ 12:33) (16 - 17)  SpO2: 95% (02-07-18 @ 12:33) (94% - 99%)  Wt(kg): --  I&O's Summary    07 Feb 2018 07:01  -  07 Feb 2018 17:47  --------------------------------------------------------  IN: 240 mL / OUT: 0 mL / NET: 240 mL      Height (cm): 157.48 (02-06 @ 18:51)  Weight (kg): 57.1 (02-06 @ 18:51)  BMI (kg/m2): 23 (02-06 @ 18:51)  BSA (m2): 1.57 (02-06 @ 18:51)  Garrido: none  Central/PICC/Mid Line: none                                        Appearance: Normal	  HEENT:   Normal oral mucosa, PERRL, EOMI	  Neck: Supple, - JVD; Carotid Bruit   Cardiovascular: Normal S1 S2, No JVD, No murmurs,   Respiratory: crackles on right middle lobe and b/l bases   Gastrointestinal:  Soft, Non-tender, + BS	  Skin: No rashes, No ecchymoses, No cyanosis  Extremities: Normal range of motion, No clubbing, cyanosis or edema  Vascular: Peripheral pulses palpable 2+ bilaterally  Neurologic: Non-focal  Psychiatry: A & O x 3, Mood & affect appropriate    LABS:                        9.8    5.8   )-----------( 211      ( 07 Feb 2018 06:02 )             29.2     02-07    131<L>  |  92<L>  |  15  ----------------------------<  100<H>  3.4<L>   |  26  |  0.90    Ca    8.8      07 Feb 2018 06:02  Mg     1.7     02-07    TPro  7.0  /  Alb  3.5  /  TBili  0.4  /  DBili  x   /  AST  30  /  ALT  16  /  AlkPhos  69  02-06      ASSESSMENT/PLAN: Interventional Cardiology PA Adult Progress Note    Chief complaint: cough, tiredness.   Subjective Assessment: endorsing cough and fatigue x 4 days. Denies chest pain, sob, dizziness, palpitations, nausea.     12 point review of systems  otherwise negative except for subjective and HPI.     MEDICATIONS:  losartan 100 milliGRAM(s) Oral daily  ampicillin/sulbactam  IVPB      levoFLOXacin  Tablet 750 milliGRAM(s) Oral every 48 hours  acetaminophen   Tablet 650 milliGRAM(s) Oral every 6 hours PRN  memantine 5 milliGRAM(s) Oral two times a day  pantoprazole    Tablet 40 milliGRAM(s) Oral before breakfast  atorvastatin 10 milliGRAM(s) Oral at bedtime  aspirin enteric coated 81 milliGRAM(s) Oral daily  cholecalciferol 1000 Unit(s) Oral daily  heparin  Injectable 5000 Unit(s) SubCutaneous every 8 hours  sodium chloride 0.9%. 1000 milliLiter(s) IV Continuous <Continuous>    PHYSICAL EXAM:  TELEMETRY: no events on telemetry  T(C): 38.3 (02-07-18 @ 16:32), Max: 38.3 (02-07-18 @ 16:32)  HR: 75 (02-07-18 @ 12:33) (63 - 82)  BP: 118/63 (02-07-18 @ 12:33) (118/62 - 152/68)  RR: 16 (02-07-18 @ 12:33) (16 - 17)  SpO2: 95% (02-07-18 @ 12:33) (94% - 99%)  Wt(kg): --  I&O's Summary    07 Feb 2018 07:01  -  07 Feb 2018 17:47  --------------------------------------------------------  IN: 240 mL / OUT: 0 mL / NET: 240 mL      Height (cm): 157.48 (02-06 @ 18:51)  Weight (kg): 57.1 (02-06 @ 18:51)  BMI (kg/m2): 23 (02-06 @ 18:51)  BSA (m2): 1.57 (02-06 @ 18:51)  Garrido: none  Central/PICC/Mid Line: none                                        Appearance: Normal	  HEENT:   Normal oral mucosa, PERRL, EOMI	  Neck: Supple, - JVD; Carotid Bruit   Cardiovascular: Normal S1 S2, No JVD, No murmurs,   Respiratory: crackles on right middle lobe and b/l bases   Gastrointestinal:  Soft, Non-tender, + BS	  Skin: No rashes, No ecchymoses, No cyanosis  Extremities: Normal range of motion, No clubbing, cyanosis or edema  Vascular: Peripheral pulses palpable 2+ bilaterally  Neurologic: Non-focal  Psychiatry: A & O x 3, Mood & affect appropriate    LABS:                        9.8    5.8   )-----------( 211      ( 07 Feb 2018 06:02 )             29.2     02-07    131<L>  |  92<L>  |  15  ----------------------------<  100<H>  3.4<L>   |  26  |  0.90    Ca    8.8      07 Feb 2018 06:02  Mg     1.7     02-07    TPro  7.0  /  Alb  3.5  /  TBili  0.4  /  DBili  x   /  AST  30  /  ALT  16  /  AlkPhos  69  02-06      ASSESSMENT/PLAN:

## 2018-02-08 DIAGNOSIS — Z02.9 ENCOUNTER FOR ADMINISTRATIVE EXAMINATIONS, UNSPECIFIED: ICD-10-CM

## 2018-02-08 LAB
ALBUMIN SERPL ELPH-MCNC: 3.2 G/DL — LOW (ref 3.3–5)
ALP SERPL-CCNC: 60 U/L — SIGNIFICANT CHANGE UP (ref 40–120)
ALT FLD-CCNC: 18 U/L — SIGNIFICANT CHANGE UP (ref 10–45)
ANION GAP SERPL CALC-SCNC: 9 MMOL/L — SIGNIFICANT CHANGE UP (ref 5–17)
APTT BLD: 28.8 SEC — SIGNIFICANT CHANGE UP (ref 27.5–37.4)
AST SERPL-CCNC: 35 U/L — SIGNIFICANT CHANGE UP (ref 10–40)
BILIRUB SERPL-MCNC: 0.3 MG/DL — SIGNIFICANT CHANGE UP (ref 0.2–1.2)
BUN SERPL-MCNC: 13 MG/DL — SIGNIFICANT CHANGE UP (ref 7–23)
CALCIUM SERPL-MCNC: 8.8 MG/DL — SIGNIFICANT CHANGE UP (ref 8.4–10.5)
CHLORIDE SERPL-SCNC: 94 MMOL/L — LOW (ref 96–108)
CO2 SERPL-SCNC: 28 MMOL/L — SIGNIFICANT CHANGE UP (ref 22–31)
CREAT SERPL-MCNC: 1.05 MG/DL — SIGNIFICANT CHANGE UP (ref 0.5–1.3)
GLUCOSE SERPL-MCNC: 100 MG/DL — HIGH (ref 70–99)
HCT VFR BLD CALC: 28 % — LOW (ref 34.5–45)
HGB BLD-MCNC: 9.6 G/DL — LOW (ref 11.5–15.5)
INR BLD: 1.2 — HIGH (ref 0.88–1.16)
MAGNESIUM SERPL-MCNC: 1.6 MG/DL — SIGNIFICANT CHANGE UP (ref 1.6–2.6)
MCHC RBC-ENTMCNC: 30.8 PG — SIGNIFICANT CHANGE UP (ref 27–34)
MCHC RBC-ENTMCNC: 34.3 G/DL — SIGNIFICANT CHANGE UP (ref 32–36)
MCV RBC AUTO: 89.7 FL — SIGNIFICANT CHANGE UP (ref 80–100)
PLATELET # BLD AUTO: 213 K/UL — SIGNIFICANT CHANGE UP (ref 150–400)
POTASSIUM SERPL-MCNC: 3.8 MMOL/L — SIGNIFICANT CHANGE UP (ref 3.5–5.3)
POTASSIUM SERPL-SCNC: 3.8 MMOL/L — SIGNIFICANT CHANGE UP (ref 3.5–5.3)
PROT SERPL-MCNC: 6.3 G/DL — SIGNIFICANT CHANGE UP (ref 6–8.3)
PROTHROM AB SERPL-ACNC: 13.4 SEC — HIGH (ref 9.8–12.7)
RBC # BLD: 3.12 M/UL — LOW (ref 3.8–5.2)
RBC # FLD: 13.9 % — SIGNIFICANT CHANGE UP (ref 10.3–16.9)
SODIUM SERPL-SCNC: 131 MMOL/L — LOW (ref 135–145)
WBC # BLD: 5.6 K/UL — SIGNIFICANT CHANGE UP (ref 3.8–10.5)
WBC # FLD AUTO: 5.6 K/UL — SIGNIFICANT CHANGE UP (ref 3.8–10.5)

## 2018-02-08 PROCEDURE — 99233 SBSQ HOSP IP/OBS HIGH 50: CPT

## 2018-02-08 RX ORDER — POTASSIUM CHLORIDE 20 MEQ
20 PACKET (EA) ORAL ONCE
Qty: 0 | Refills: 0 | Status: COMPLETED | OUTPATIENT
Start: 2018-02-08 | End: 2018-02-08

## 2018-02-08 RX ORDER — GALANTAMINE HYDROBROMIDE 4 MG/1
24 TABLET, FILM COATED ORAL
Qty: 0 | Refills: 0 | Status: DISCONTINUED | OUTPATIENT
Start: 2018-02-08 | End: 2018-02-13

## 2018-02-08 RX ORDER — SODIUM CHLORIDE 9 MG/ML
1000 INJECTION INTRAMUSCULAR; INTRAVENOUS; SUBCUTANEOUS
Qty: 0 | Refills: 0 | Status: DISCONTINUED | OUTPATIENT
Start: 2018-02-08 | End: 2018-02-09

## 2018-02-08 RX ORDER — AMPICILLIN SODIUM AND SULBACTAM SODIUM 250; 125 MG/ML; MG/ML
3 INJECTION, POWDER, FOR SUSPENSION INTRAMUSCULAR; INTRAVENOUS EVERY 6 HOURS
Qty: 0 | Refills: 0 | Status: DISCONTINUED | OUTPATIENT
Start: 2018-02-08 | End: 2018-02-09

## 2018-02-08 RX ORDER — MAGNESIUM SULFATE 500 MG/ML
2 VIAL (ML) INJECTION ONCE
Qty: 0 | Refills: 0 | Status: COMPLETED | OUTPATIENT
Start: 2018-02-08 | End: 2018-02-08

## 2018-02-08 RX ORDER — IPRATROPIUM/ALBUTEROL SULFATE 18-103MCG
3 AEROSOL WITH ADAPTER (GRAM) INHALATION EVERY 6 HOURS
Qty: 0 | Refills: 0 | Status: DISCONTINUED | OUTPATIENT
Start: 2018-02-08 | End: 2018-02-13

## 2018-02-08 RX ADMIN — AMPICILLIN SODIUM AND SULBACTAM SODIUM 200 GRAM(S): 250; 125 INJECTION, POWDER, FOR SUSPENSION INTRAMUSCULAR; INTRAVENOUS at 18:02

## 2018-02-08 RX ADMIN — Medication 81 MILLIGRAM(S): at 11:20

## 2018-02-08 RX ADMIN — AMPICILLIN SODIUM AND SULBACTAM SODIUM 200 GRAM(S): 250; 125 INJECTION, POWDER, FOR SUSPENSION INTRAMUSCULAR; INTRAVENOUS at 05:46

## 2018-02-08 RX ADMIN — Medication 20 MILLIEQUIVALENT(S): at 09:15

## 2018-02-08 RX ADMIN — MEMANTINE HYDROCHLORIDE 5 MILLIGRAM(S): 10 TABLET ORAL at 05:49

## 2018-02-08 RX ADMIN — Medication 50 GRAM(S): at 09:15

## 2018-02-08 RX ADMIN — HEPARIN SODIUM 5000 UNIT(S): 5000 INJECTION INTRAVENOUS; SUBCUTANEOUS at 13:18

## 2018-02-08 RX ADMIN — PANTOPRAZOLE SODIUM 40 MILLIGRAM(S): 20 TABLET, DELAYED RELEASE ORAL at 07:32

## 2018-02-08 RX ADMIN — Medication 1000 UNIT(S): at 11:20

## 2018-02-08 RX ADMIN — SODIUM CHLORIDE 40 MILLILITER(S): 9 INJECTION INTRAMUSCULAR; INTRAVENOUS; SUBCUTANEOUS at 11:31

## 2018-02-08 RX ADMIN — HEPARIN SODIUM 5000 UNIT(S): 5000 INJECTION INTRAVENOUS; SUBCUTANEOUS at 05:49

## 2018-02-08 RX ADMIN — MEMANTINE HYDROCHLORIDE 5 MILLIGRAM(S): 10 TABLET ORAL at 18:02

## 2018-02-08 RX ADMIN — LOSARTAN POTASSIUM 100 MILLIGRAM(S): 100 TABLET, FILM COATED ORAL at 05:47

## 2018-02-08 RX ADMIN — HEPARIN SODIUM 5000 UNIT(S): 5000 INJECTION INTRAVENOUS; SUBCUTANEOUS at 21:22

## 2018-02-08 RX ADMIN — ATORVASTATIN CALCIUM 10 MILLIGRAM(S): 80 TABLET, FILM COATED ORAL at 21:22

## 2018-02-08 NOTE — PROGRESS NOTE ADULT - PROBLEM SELECTOR PLAN 1
Change in Functional Status / Found acutely lethargic @ home. likely 2/2 hyponatremia and aspiration pneumonia.   - UA shows blood but no RBCs (likely rhabdomyolysis which was tx with IVF).   - h/o syncopal episode with negative work up 1/16/18 @ [facility]  - CT head negative on this admission. Echo NL.  - TSH WNL  - Carotid US no HD sig stenosis.   - Dr Mcrae has no further neuro recommendations  - Psychiatry (Dr. Shaikh) consulted for altered mental status/Demenita.  f/u Recs.  - Continue dementia medication.  Namenda 5mg BID, Galantamine (family to bring)  - Continue Enhanced supervision/fall risk.

## 2018-02-08 NOTE — PROGRESS NOTE ADULT - PROBLEM SELECTOR PLAN 7
DVT PPX: Hep SC  Enhanced supervision.  PT/SW: recommending home w/ home PT and 24hr Aid.  Given recent change in function status Dr. Kramer consulted.  f/u Recs.

## 2018-02-08 NOTE — PROGRESS NOTE ADULT - PROBLEM SELECTOR PLAN 3
Dr. Cates  - Na 126, K 3.8 with h/o decreased PO intake over the past few days. Repeat Na 131 on 2/7 AM after 1L IVF. Continue hydration with NS 40cc/hr x 6 hours  - repeat BMP ordered, serum osm, cortisol, urine lytes  - HOLDING HCTZ per Dr. Cates. This was likely cause of hyponatremia. Dr Bernabe consulted (Dr. Gan following today), continued productive cough, denies SOB, satting well RA.  no leukocytosis, afebrile today  - CXR positive for right hilar infiltrate concerning for aspiration pneumonia.   - CT Chest 2/7: Right middle lobe and right lower lobe consolidation consistent with   multifocal pneumonia, Small right pleural effusion.  14 mm groundglass opacity right upper lobe stable since 1/21/2016 (f/u CT 1yr).   - Continue Unasyn 3mg BID and Levaquin 750mg every 48 hours started   - Sputum culture ordered.  - Recent sinus infection, Xray sinuses performed, f/u results.    - Speech and swallow consulted and cleared patient for regular diet.   - Continue Aspiration precautions, incentive spirometry.

## 2018-02-08 NOTE — PROGRESS NOTE ADULT - SUBJECTIVE AND OBJECTIVE BOX
CC/ HPI 89 y/o active female with hypertension, dementia,  paroxysmal atrial fibrillation,  admitted with altered mental status, c/o cough, suspected pneumonia, seen this morning without acute respiratory complaint.    PAST MEDICAL & SURGICAL HISTORY:  AS (aortic stenosis)  Atrial fibrillation: Pt. had new onset of Afib two weeks ago while hospitalized for UTI and Sinusitis.  No AC.  Pt. currently SR  Sinusitis  UTI (urinary tract infection): Pt. was recently hospitalized in [place] two weeks ago tx with ABx  Essential hypertension: HTN (hypertension)  Cerebral artery occlusion with cerebral infarction: CVA (cerebral infarction)  Hyperlipidemia: HLD (hyperlipidemia)  Memory loss: Memory loss  Fall: Falls  No significant past surgical history    SOCHX:  -  alcohol    FMHX: FA/MO  - contributory     ROS reviewed below with positive findings marked (+) :  GEN:  fever, chills ENT: tracheostomy,   epistaxis,  sinusitis COR: CAD, CHF,  +HTN, +dysrhythmia PUL: COPD, ILD, asthma, pneumonia GI: PEG, dysphagia, hemorrhage, other PAULA: kidney disease, electrolyte disorder HEM:  anemia, thrombus, coagulopathy, cancer ENDO:  thyroid disease, diabetes mellitus CNS:  +dementia, +stroke, seizure, PSY:  depression, anxiety, other      MEDICATIONS  (STANDING):  ampicillin/sulbactam  IVPB      ampicillin/sulbactam  IVPB 3 Gram(s) IV Intermittent every 12 hours  aspirin enteric coated 81 milliGRAM(s) Oral daily  atorvastatin 10 milliGRAM(s) Oral at bedtime  cholecalciferol 1000 Unit(s) Oral daily  heparin  Injectable 5000 Unit(s) SubCutaneous every 8 hours  levoFLOXacin  Tablet 750 milliGRAM(s) Oral every 48 hours  losartan 100 milliGRAM(s) Oral daily  memantine 5 milliGRAM(s) Oral two times a day  pantoprazole    Tablet 40 milliGRAM(s) Oral before breakfast  sodium chloride 0.9%. 1000 milliLiter(s) (40 mL/Hr) IV Continuous <Continuous>    MEDICATIONS  (PRN):  acetaminophen   Tablet 650 milliGRAM(s) Oral every 6 hours PRN For Temp greater than 38 C (100.4 F)      Vital Signs Last 24 Hrs  T(C): 37.3 (08 Feb 2018 06:02), Max: 38.3 (07 Feb 2018 16:32)  T(F): 99.2 (08 Feb 2018 06:02), Max: 101 (07 Feb 2018 16:32)  HR: 71 (08 Feb 2018 08:40) (67 - 78)  BP: 125/70 (08 Feb 2018 08:40) (105/68 - 144/74)  BP(mean): 91 (08 Feb 2018 06:30) (88 - 98)  RR: 16 (08 Feb 2018 08:40) (16 - 18)  SpO2: 97% (08 Feb 2018 08:40) (95% - 97%)    GENERAL:         comfortable,  - distress.  HEENT:            - trauma,  - icterus,  - injection,  - nasal discharge.  NECK:              - jugular venous distention, - thyromegaly.  LYMPH:           - lymphadenopathy, - masses.  RESP:               + crackles,   + rhonchi,   - wheezes.   COR:                S1S2   - gallops,  - rubs.  ABD:                bowel sounds,   soft, - tender, - distended, - organomegaly.  EXT/MSC:         - cyanosis,  - clubbing,  - edema.    NEURO:             alert,   responds to stimuli.                        9.6    5.6   )-----------( 213      ( 08 Feb 2018 06:24 )             28.0     02-08    131<L>  |  94<L>  |  13  ----------------------------<  100<H>  3.8   |  28  |  1.05        Xray Chest-Urgent (02.07.18)  New airspace consolidation right base medially.        ASSESSMENT/PLAN    1) Altered mental status  2) Pneumonia  3) Hyponatremia  4) Hypertension    Oxygen as needed  Follow up metabolic panel  Bronchodilators:  Atrovent/ albuterol q 4 – 6 hours as needed  ID/Antibiotics: Levaquin/Unasyn, follow up CT chest, Cx's  Cardiac/HTN: Losartan  GI: Rx/ prophylaxis c PPI/H2B  Heme: Rx/VT prophylaxis c SQH  Discussed with Dr. Bernabe CC/ HPI 89 y/o active female with hypertension, dementia,  paroxysmal atrial fibrillation,  admitted with altered mental status, c/o cough, suspected pneumonia, seen this morning without acute respiratory complaint.    PAST MEDICAL & SURGICAL HISTORY:  AS (aortic stenosis)  Atrial fibrillation: Pt. had new onset of Afib two weeks ago while hospitalized for UTI and Sinusitis.  No AC.  Pt. currently SR  Sinusitis  UTI (urinary tract infection): Pt. was recently hospitalized in [place] two weeks ago tx with ABx  Essential hypertension: HTN (hypertension)  Cerebral artery occlusion with cerebral infarction: CVA (cerebral infarction)  Hyperlipidemia: HLD (hyperlipidemia)  Memory loss: Memory loss  Fall: Falls  No significant past surgical history    SOCHX:  -  alcohol    FMHX: FA/MO  - contributory     ROS reviewed below with positive findings marked (+) :  GEN:  fever, chills ENT: tracheostomy,   epistaxis,  sinusitis COR: CAD, CHF,  +HTN, +dysrhythmia PUL: COPD, ILD, asthma, pneumonia GI: PEG, dysphagia, hemorrhage, other PAULA: kidney disease, electrolyte disorder HEM:  anemia, thrombus, coagulopathy, cancer ENDO:  thyroid disease, diabetes mellitus CNS:  +dementia, +stroke, seizure, PSY:  depression, anxiety, other      MEDICATIONS  (STANDING):  ampicillin/sulbactam  IVPB      ampicillin/sulbactam  IVPB 3 Gram(s) IV Intermittent every 12 hours  aspirin enteric coated 81 milliGRAM(s) Oral daily  atorvastatin 10 milliGRAM(s) Oral at bedtime  cholecalciferol 1000 Unit(s) Oral daily  heparin  Injectable 5000 Unit(s) SubCutaneous every 8 hours  levoFLOXacin  Tablet 750 milliGRAM(s) Oral every 48 hours  losartan 100 milliGRAM(s) Oral daily  memantine 5 milliGRAM(s) Oral two times a day  pantoprazole    Tablet 40 milliGRAM(s) Oral before breakfast  sodium chloride 0.9%. 1000 milliLiter(s) (40 mL/Hr) IV Continuous <Continuous>    MEDICATIONS  (PRN):  acetaminophen   Tablet 650 milliGRAM(s) Oral every 6 hours PRN For Temp greater than 38 C (100.4 F)      Vital Signs Last 24 Hrs  T(C): 37.3 (08 Feb 2018 06:02), Max: 38.3 (07 Feb 2018 16:32)  T(F): 99.2 (08 Feb 2018 06:02), Max: 101 (07 Feb 2018 16:32)  HR: 71 (08 Feb 2018 08:40) (67 - 78)  BP: 125/70 (08 Feb 2018 08:40) (105/68 - 144/74)  BP(mean): 91 (08 Feb 2018 06:30) (88 - 98)  RR: 16 (08 Feb 2018 08:40) (16 - 18)  SpO2: 97% (08 Feb 2018 08:40) (95% - 97%)    GENERAL:         comfortable,  - distress.  HEENT:            - trauma,  - icterus,  - injection,  - nasal discharge.  NECK:              - jugular venous distention, - thyromegaly.  LYMPH:           - lymphadenopathy, - masses.  RESP:               + crackles,   + rhonchi,   - wheezes.   COR:                S1S2   - gallops,  - rubs.  ABD:                bowel sounds,   soft, - tender, - distended, - organomegaly.  EXT/MSC:         - cyanosis,  - clubbing,  - edema.    NEURO:             alert,   responds to stimuli.                        9.6    5.6   )-----------( 213      ( 08 Feb 2018 06:24 )             28.0     02-08    131<L>  |  94<L>  |  13  ----------------------------<  100<H>  3.8   |  28  |  1.05        CT Chest No Cont (02.07.18) Right middle lobe and right lower lobe consolidation consistent with multifocal pneumonia.  2. Small right pleural effusion.  3. 14 mm groundglass opacity right upper lobe stable since 1/21/2016.   Recommend follow-up CT chest in 24 months to confirm stability.      Xray Chest-Urgent (02.07.18)  New airspace consolidation right base medially.    ASSESSMENT/PLAN    1) Altered mental status  2) Pneumonia  3) Pulmonary nodule  4) Hypertension    Oxygen as needed  Follow up metabolic panel  Bronchodilators:  Atrovent/ albuterol q 4 – 6 hours as needed  ID/Antibiotics: Levaquin/Unasyn  Cardiac/HTN: Losartan  GI: Rx/ prophylaxis c PPI/H2B  Heme: Rx/VT prophylaxis c SQH  Discussed with Dr. Bernabe

## 2018-02-08 NOTE — CONSULT NOTE ADULT - ASSESSMENT
89 y/o active female (lives alone w/24h HHA) w/ PMHx of HTN, HLD, Dementia (A&Ox1), hx Cerebral Artery Occlusion, pAfib (no AC, pt. new onset ~1mo ago 2/2 to UTI/Pyelo in [place]; currently SR), Prior Holter monitor w/ Bradycardia who presented to [facility] ED 1/16 after change in functional status / lethargy per family and admitted to Tele for possible Sick Sinus syndrome and found to have PNA.     Problem/Plan - 1:  ·  Problem: Altered mental status, unspecified altered mental status type.  Plan: Change in Functional Status / Found acutely lethargic @ home. likely 2/2 hyponatremia and aspiration pneumonia.   - UA shows blood but no RBCs (likely rhabdomyolysis which was tx with IVF).   - h/o syncopal episode with negative work up 1/16/18 @ [facility]  - CT head negative on this admission. Echo NL.  - TSH WNL  - Carotid US no HD sig stenosis.   - Dr Mcrae has no further neuro recommendations  - Psychiatry (Dr. Shaikh) consulted for altered mental status/Demenita.  f/u Recs.  - Continue dementia medication.  Namenda 5mg BID, Galantamine (family to bring)  - Continue Enhanced supervision/fall risk.     Problem/Plan - 2:  ·  Problem: Sick sinus syndrome.  Plan: Concern for SSS, EP service consulted.   - Holter monitor @ home with eb to 40s, no runs of afib noted  - Echo 1/16/18 with NL EF 60-65%, NL wall motion, No HD sig valvular disease  - Tele shows NSR 60s-70s, no pauses or afib while at [facility]  - Per EP Recs consider starting A/C (Continue to hold for now as per Dr. Stahl).  f/u Recs regarding loop recorder.    Problem/Plan - 3:  ·  Problem: Pneumonia, aspiration. Plan: Dr Bernabe consulted (Dr. Gan following today), continued productive cough, denies SOB, satting well RA.  no leukocytosis, afebrile today  - CXR positive for right hilar infiltrate concerning for aspiration pneumonia.   - CT Chest 2/7: Right middle lobe and right lower lobe consolidation consistent with   multifocal pneumonia, Small right pleural effusion.  14 mm groundglass opacity right upper lobe stable since 1/21/2016 (f/u CT 1yr).   - Continue Unasyn 3mg BID and Levaquin 750mg every 48 hours started   - Sputum culture ordered.  - Recent sinus infection, Xray sinuses performed, f/u results.    - Speech and swallow consulted and cleared patient for regular diet.   - Continue Aspiration precautions, incentive spirometry.    Problem/Plan - 4:  ·  Problem: Hyponatremia. Plan: Dr. Cates (Dr. Fox covering)  - Na 126 on admit w/ h/o decreased PO intake over the past few days and taking HCTZ at home.  - NA remains stable 131 today.    - Continue Gentle hydration NS @ 40cc/hr today  - Continue holding HCTZ.

## 2018-02-08 NOTE — PROGRESS NOTE ADULT - PROBLEM SELECTOR PLAN 6
- continue Lipitor 10 mg PO daily    DVT PPX: hep SC  GI PPX: protonix  DISPO: pending consult from EP, neurology, renal - continue Lipitor 10 mg PO daily

## 2018-02-08 NOTE — PROGRESS NOTE ADULT - ASSESSMENT
89 y/o active female (lives alone w/24h HHA) w/ PMHx of HTN, HLD, Dementia (A&Ox1), hx Cerebral Artery Occlusion, pAfib (no AC, pt. new onset ~1mo ago 2/2 to UTI/Pyelo in [place]; currently SR), Prior Holter monitor w/ Bradycardia who presented to [facility] ED 1/16 after change in functional status / lethargy per family and admitted to Tele for possible Sick Sinus syndrome and found to have PNA.

## 2018-02-08 NOTE — PROGRESS NOTE ADULT - PROBLEM SELECTOR PLAN 4
Dr Bernabe consulted  - pt has cough x 4 days. CXR positive for right hilar infiltrate concerning for aspiration pneumonia.   - Unasyn 3mg BID and Levaquin 750mg every 48 hours started  - Temp 101 430pm, relieved with Tylenol 650mg x 1.   - Sputum culture ordered, CT chest noncontrast performed. Xray sinuses ordered.   - Legionella ordered  - Speech and swallow cleared patient for regular diet.   - Aspiration precautions ordered Dr. Cates (Dr. Fox covering)  - Na 126 on admit w/ h/o decreased PO intake over the past few days and taking HCTZ at home.  - NA remains stable 131 today.    - Continue Gentle hydration NS @ 40cc/hr today  - Continue holding HCTZ.

## 2018-02-08 NOTE — CONSULT NOTE ADULT - SUBJECTIVE AND OBJECTIVE BOX
Patient is a 89y old  Female who presents with a chief complaint of      HPI:  89 y/o active female (lives alone w/24h HHA) w/ PMHx of HTN, HLD, Dementia (A&Ox1), hx Cerebral Artery Occlusion, pAfib (no AC, pt. new onset ~1mo ago 2/2 to UTI/Pyelo in [place]; currently SR) presented to [facility] ED 1/16 after change in functional status / lethargy per family. Pt's son stated that upon arriving to her apt this AM to take her to breakfast, she was slumped over and lethargic. Normally she walks on her own, but she needed her son and daughter-in-law to help her down the stairs. The aid reported that she had a decreased PO intake over the past few days but no new symptoms or complaints aside from mild back pain since her last admission to [facility] for syncope work up. Denied fever, chills, recent illness, sick contacts, chest pain, SOB, HAYDEN, orthopnea, LE edema, n/v/d, abd pain, dysuria, hematuria. Pt herself denies any symptoms however A&Ox1. Of note, patient was recently hospitalized December 2017 in [place], [place] for UTI/Pyelo and sinusitis and treated with IV abx, and upon return to [place], had a syncopal episode 1/15 and was admitted to [facility] with a negative work up including CTA brain, Echo 1/16 revealing NL LV wall motion, LVEF NL 60-65%, No AS. Carotid US revealed mild-mod dz, TSH WNL, and UA repeated which was negative for UTI and urine culture negative. Pt was given a Holter Monitor otpt by Dr. Stahl which revealed no atrial fibrillation, but bradycardia to 40s. In the ED today VSS, afebrile, ECG NSR @ 61 BPM no acute STT changes, CT Head unchanged, labs sig for troponin negative x1, Na 126, K 3.8. Pt will be admitted to [facility] for further monitoring, EP consult, and work up for possible sick sinus syndrome. (06 Feb 2018 13:03)      PAST MEDICAL & SURGICAL HISTORY:  AS (aortic stenosis)  Atrial fibrillation: Pt. had new onset of Afib two weeks ago while hospitalized for UTI and Sinusitis.  No AC.  Pt. currently SR  Sinusitis  UTI (urinary tract infection): Pt. was recently hospitalized in [place] two weeks ago tx with ABx  Essential hypertension: HTN (hypertension)  Cerebral artery occlusion with cerebral infarction: CVA (cerebral infarction)  Hyperlipidemia: HLD (hyperlipidemia)  Memory loss: Memory loss  Fall: Falls  No significant past surgical history      MEDICATIONS  (STANDING):  ampicillin/sulbactam  IVPB      ampicillin/sulbactam  IVPB 3 Gram(s) IV Intermittent every 12 hours  aspirin enteric coated 81 milliGRAM(s) Oral daily  atorvastatin 10 milliGRAM(s) Oral at bedtime  cholecalciferol 1000 Unit(s) Oral daily  heparin  Injectable 5000 Unit(s) SubCutaneous every 8 hours  levoFLOXacin  Tablet 750 milliGRAM(s) Oral every 48 hours  losartan 100 milliGRAM(s) Oral daily  memantine 5 milliGRAM(s) Oral two times a day  pantoprazole    Tablet 40 milliGRAM(s) Oral before breakfast  sodium chloride 0.9%. 1000 milliLiter(s) (40 mL/Hr) IV Continuous <Continuous>    MEDICATIONS  (PRN):  acetaminophen   Tablet 650 milliGRAM(s) Oral every 6 hours PRN For Temp greater than 38 C (100.4 F)  ALBUTerol/ipratropium for Nebulization 3 milliLiter(s) Nebulizer every 6 hours PRN Shortness of Breath and/or Wheezing      Social History: , has a son who lives in [place], primary residence is in [facility], stays at the [facility] in [facility], has private HHA 24 hrs x 7 days     Functional Level Prior to Admission: daughter in law (present at bedside) reports she requires supervision with bathing, walked without assistive devices    FAMILY HISTORY:      CBC Full  -  ( 08 Feb 2018 06:24 )  WBC Count : 5.6 K/uL  Hemoglobin : 9.6 g/dL  Hematocrit : 28.0 %  Platelet Count - Automated : 213 K/uL  Mean Cell Volume : 89.7 fL  Mean Cell Hemoglobin : 30.8 pg  Mean Cell Hemoglobin Concentration : 34.3 g/dL  Auto Neutrophil # : x  Auto Lymphocyte # : x  Auto Monocyte # : x  Auto Eosinophil # : x  Auto Basophil # : x  Auto Neutrophil % : x  Auto Lymphocyte % : x  Auto Monocyte % : x  Auto Eosinophil % : x  Auto Basophil % : x      02-08    131<L>  |  94<L>  |  13  ----------------------------<  100<H>  3.8   |  28  |  1.05    Ca    8.8      08 Feb 2018 06:23  Mg     1.6     02-08    TPro  6.3  /  Alb  3.2<L>  /  TBili  0.3  /  DBili  x   /  AST  35  /  ALT  18  /  AlkPhos  60  02-08            Radiology:    < from: Xray Chest 1 View- PORTABLE-Urgent (02.07.18 @ 11:02) >  EXAM:  XR CHEST PORTABLE URGENT 1V                          PROCEDURE DATE:  02/07/2018                     INTERPRETATION:  PORTABLE CHEST X-RAY     HISTORY: assess current lung status    PRIOR STUDIES: 1/20/2018.    FINDINGS: New airspace consolidation right medial base. The rest of the   lungs are clear. There are no pleural effusions.  The cardiomediastinal   silhouette, bones and soft tissues are unremarkable.    IMPRESSION:  New airspace consolidation right base medially.        < from: CT Head No Cont (02.06.18 @ 11:37) >  EXAM:  CT BRAIN                          PROCEDURE DATE:  02/06/2018                     INTERPRETATION:  Joey ANTON MD, have reviewed the images and   the report and agree with the findings.    RESIDENT PRELIMINARY REPORT    PROCEDURE:CT head without intravenous contrast    INDICATIONS: Altered mental status and inability to walk unassisted.    TECHNIQUE:  Serial axial images were obtained from the skull base to the   vertex without the use of intravenous contrast. Imaging is performed   using helical low-dose technique, and sagittal and coronal reformations   are provided.    COMPARISON EXAMINATION: None.    FINDINGS:    VENTRICLES AND SULCI: There is age-appropriate parenchymal volume loss   with prominence of the ventricles, likely secondary to ex vacuo   dilatation. No definite hydrocephalus.  INTRA-AXIAL: No acute intracranial hemorrhage or midline shift is   present. No acute transcortical infarct. There are patchy periventricular   hypodensities, which is nonspecific, and may represent the sequela of   chronic microangiopathic ischemic disease.  EXTRA-AXIAL: No extra-axial fluid collection is present.   VISUALIZED SINUSES: The visualized paranasal sinuses are predominantly   clear.  VISUALIZED MASTOIDS:  Clear.  CALVARIUM:  Normal.  MISCELLANEOUS:  None.    IMPRESSION:    1.  No acute intracranial hemorrhage or acute transcortical  2.  Age-appropriate volume loss with ex vacuo dilatation of the   ventricles.        < from: CT Chest No Cont (02.07.18 @ 18:48) >  EXAM:  CT CHEST                          PROCEDURE DATE:  02/07/2018                     INTERPRETATION:  CT of the CHEST without intravenous contrast dated   2/7/2018 6:48 PM    INDICATION: New right hilar infiltrates    Technique:Ct scan of chestwas performed from the lung apices through the   lung. Axial, coronal and sagittal reformatted images and axial maximum   intensity projection images (MIPS) and reviewed. Intravenous contrast was   not administered as requested.    PRIOR STUDIES: Chest x-ray 2/7/2028, imported outside CT chest dated 620 7/17 and 1/21/2016.    FINDINGS: The heart is normal in size.  Moderate coronary artery   calcification. Mild mitral valve and aortic valve calcification. No   pericardial effusion is seen. Mildcalcified plaque thoracic aorta.   Ascending thoracic aorta 4.0 cm which is top normal limits. Descending   thoracic aorta measures 3.0 cm, and the aortic arch measures 3.3 cm in   diameter. No mediastinal or left hilar lymphadenopathy is seen. Prominent   right hilum likely representing right hilar adenopathy. Bilateral breast   calcifications.    A small right pleural effusion is seen. Evaluation of the pulmonary   parenchyma is limited due to respiratory motion artifact. There is   consolidation involving right middle lobe and to lesser extent posterior   basal segment of the right lower lobe. There is 14 mm groundglass opacity   apical segment of the right upper lobe, unchanged since 1/21/2016. The   numerous bilateral micronodules seen on the prior outside CT of the chest   cannot be evaluated at this time due to extensive respiratory motion   artifact.    Limited evaluation of the upper abdomen demonstrates tiny calcified   granuloma in the liver. A 1.5 cm accessory spleen is present. There is   1.4 cm left adrenal mass with density of 12 Hounsfield unit which   probably represent adenoma, unchanged compared to 1/21/2016    Evaluation of the osseous structures demonstrates degenerative changes of   the spines and shoulders.      IMPRESSION:  1. Right middle lobe and right lower lobe consolidation consistent with   multifocal pneumonia.    2. Small right pleural effusion.    3. 14 mm groundglass opacity right upper lobe stable since 1/21/2016.   Recommend follow-up CT chest in 24 months to confirm stability.              Vital Signs Last 24 Hrs  T(C): 36.2 (08 Feb 2018 13:55), Max: 38.3 (07 Feb 2018 16:32)  T(F): 97.1 (08 Feb 2018 13:55), Max: 101 (07 Feb 2018 16:32)  HR: 76 (08 Feb 2018 12:51) (67 - 78)  BP: 111/64 (08 Feb 2018 12:51) (105/68 - 144/74)  BP(mean): 91 (08 Feb 2018 06:30) (88 - 98)  RR: 16 (08 Feb 2018 12:51) (16 - 18)  SpO2: 97% (08 Feb 2018 12:51) (96% - 97%)    REVIEW OF SYSTEMS:    CONSTITUTIONAL: fatigue  EYES: No eye pain, visual disturbances, or discharge  ENMT:  No difficulty hearing, tinnitus, vertigo; No sinus or throat pain  NECK: No pain or stiffness  BREASTS: No pain, masses, or nipple discharge  RESPIRATORY: No cough, wheezing, chills or hemoptysis; No shortness of breath  CARDIOVASCULAR: No chest pain, palpitations, dizziness, or leg swelling  GASTROINTESTINAL: No abdominal or epigastric pain. No nausea, vomiting, or hematemesis; No diarrhea or constipation. No melena or hematochezia.  GENITOURINARY: No dysuria, frequency, hematuria, or incontinence  NEUROLOGICAL: No headaches, memory loss, loss of strength, numbness, or tremors  SKIN: No itching, burning, rashes, or lesions   LYMPH NODES: No enlarged glands  ENDOCRINE: No heat or cold intolerance; No hair loss  MUSCULOSKELETAL: No joint pain or swelling; No muscle, back, or extremity pain  PSYCHIATRIC: No depression, anxiety, mood swings, or difficulty sleeping  HEME/LYMPH: No easy bruising, or bleeding gums  ALLERGY AND IMMUNOLOGIC: No hives or eczema      Physical Exam: WDWN , younger than stated age, lying in bed in semi Fowlers position, c/o "feeling tired", daughter in law and granddaughter present at bedside    HEENT: normocephalic,  anicteric,  atraumatic    Neck: supple, negative JVD, negative carotid bruits,    Chest: + rhonchi at R LL    Cardiovascular: regular rate and rhythm, neg murmurs/rubs/gallops    Abdomen: soft, non distended, non tender, negative rebound/guarding, normal bowel sounds, neg hepatosplenomegaly    Extremities: WWP, neg cyanosis/clubbing/edema, negative calf tenderness to palpation, negative Anu's sign    :     Neurologic Exam:    somnolent, easily arousable, oriented to person, "hospital" place,  2010 date/year, speech fluent w/o dysarthria, follows commands    Cranial Nerves:     II:                       pupils equal, round and reactive to light, visual fields intact   III/ IV/VI:             extraocular movements intact, neg nystagmus, ptosis  V:                      facial sensation intact, V1-3 normal  VII:                     face symmetric, no droop, normal eye closure and smile  VIII:                    hearing intact to finger rub bilaterally  IX/ X:                  soft palate rise symmetrical  XI:                      head turning, shoulder shrug normal  XII:                     tongue midline    Motor Exam:    Bilateral UE:         4+/5 /intrinsics                            5/5 biceps/triceps/wrist extensors-flexors/deltoid                            negative pronator drift      Bilateral LE:         4/5 hip flexors/adductors/abductors                            4+/5 quadriceps/hamstrings                            5/5 dorsiflexors/plantar flexors/invertors-evertors    Sensory:             intact to LT/PP in all UE/LE dermatomes    DTR:                   = biceps/     triceps/     brachioradialis                            = patella/   medial hamstring/    ankle                            neg clonus                            neg Babinski                            neg Hoffmans    Finger to Nose: wnl    Heel to Shin:      wnl    Rapid Alternating movements:  wnl    Joint Position Sense:  intact    Romberg: NT    Tandem Walking: NT    Gait:  NT        PM&R Impression:    1) deconditioned  2) no focal weakness      Recommendations:    1) Physical therapy focusing on therapeutic exercises, bed mobility/transfer out of bed evaluation, progressive ambulation with assistive devices.    2) Disposition Plan: current recommendation is d/c home with home physical therapy, home physical therapy, 24 hr x 7 day home care services. For functional reevaluation secondary to patient's somnolence/lethargy. May require subacute rehab placement. Will follow.

## 2018-02-08 NOTE — PROGRESS NOTE ADULT - ASSESSMENT
Pneumonia, hyponatremia, lethargy.     Suggest:    1. Continue abx.  2. Follow SNa (consistent with SIADH).  3. Follow mental status.    Please call with any questions.    Rakesh Conklin MD, FACP, FASN | kidney.[facility]  Nephrology, Hypertension, and Internal Medicine  Mobile: [phone] (Daytime Hours Only)  Office/Answering Service: [phone]  Asst. Prof. of Medicine, [facility] - Nephrology at [address]  [address], [address], [place], [place]

## 2018-02-08 NOTE — PROGRESS NOTE ADULT - SUBJECTIVE AND OBJECTIVE BOX
Interventional Cardiology PA Adult Progress Note    Subjective Assessment: Patient seen and examined today, appears more lethargic but also did not sleep overnight.  Arousable on exam.  Alert and oreinted x2  	  MEDICATIONS:  losartan 100 milliGRAM(s) Oral daily  ampicillin/sulbactam  IVPB      ampicillin/sulbactam  IVPB 3 Gram(s) IV Intermittent every 12 hours  levoFLOXacin  Tablet 750 milliGRAM(s) Oral every 48 hours  ALBUTerol/ipratropium for Nebulization 3 milliLiter(s) Nebulizer every 6 hours PRN  acetaminophen   Tablet 650 milliGRAM(s) Oral every 6 hours PRN  memantine 5 milliGRAM(s) Oral two times a day  pantoprazole    Tablet 40 milliGRAM(s) Oral before breakfast  atorvastatin 10 milliGRAM(s) Oral at bedtime  aspirin enteric coated 81 milliGRAM(s) Oral daily  cholecalciferol 1000 Unit(s) Oral daily  heparin  Injectable 5000 Unit(s) SubCutaneous every 8 hours  sodium chloride 0.9%. 1000 milliLiter(s) IV Continuous <Continuous>	    [PHYSICAL EXAM:  TELEMETRY:  T(C): 36.2 (02-08-18 @ 13:55), Max: 38.3 (02-07-18 @ 16:32)  HR: 76 (02-08-18 @ 12:51) (67 - 78)  BP: 111/64 (02-08-18 @ 12:51) (105/68 - 144/74)  RR: 16 (02-08-18 @ 12:51) (16 - 18)  SpO2: 97% (02-08-18 @ 12:51) (96% - 97%)    I&O's Summary    07 Feb 2018 07:01  -  08 Feb 2018 07:00  --------------------------------------------------------  IN: 510 mL / OUT: 600 mL / NET: -90 mL    08 Feb 2018 07:01  -  08 Feb 2018 15:50  --------------------------------------------------------  IN: 190 mL / OUT: 0 mL / NET: 190 mL    Garrido: No  Central/PICC/Mid Line: No                                         Appearance: Normal, NAD	  HEENT:   Normal oral mucosa, PERRL, EOMI	  Neck: Supple,- JVD;   Cardiovascular: Normal S1 S2, No JVD, No murmurs,   Respiratory: Lungs clear to auscultation/No Rales, Wheezing, mild rochi RLL	  Gastrointestinal:  Soft, Non-tender, + BS	  Skin: No rashes, No ecchymoses, No cyanosis  Extremities: Normal range of motion, No clubbing, cyanosis or edema  Vascular: Peripheral pulses palpable 2+ bilaterally  Neurologic: Non-focal  Psychiatry: A & O x 3, Mood & affect appropriate   	    LABS:	 	  CARDIAC MARKERS:                        9.6    5.6   )-----------( 213      ( 08 Feb 2018 06:24 )             28.0     02-08    131<L>  |  94<L>  |  13  ----------------------------<  100<H>  3.8   |  28  |  1.05    Ca    8.8      08 Feb 2018 06:23  Mg     1.6     02-08    TPro  6.3  /  Alb  3.2<L>  /  TBili  0.3  /  DBili  x   /  AST  35  /  ALT  18  /  AlkPhos  60  02-08    PT/INR - ( 08 Feb 2018 06:24 )   PT: 13.4 sec;   INR: 1.20     PTT - ( 08 Feb 2018 06:24 )  PTT:28.8 sec    ASSESSMENT/PLAN: 	  DVT ppx: Hep Sub Q

## 2018-02-08 NOTE — PROGRESS NOTE ADULT - SUBJECTIVE AND OBJECTIVE BOX
CC: ALTERED MENTAL STATUSHYPOANTREMIA    INTERVAL HISTORY:    * Lethargic but arousable. Reportedly did not sleep last night. * HTN borderline controlled. *     ROS: No chest pain. No shortness of breath. No nausea.    PAST MEDICAL & SURGICAL HISTORY:  AS (aortic stenosis)  Atrial fibrillation: Pt. had new onset of Afib two weeks ago while hospitalized for UTI and Sinusitis.  No AC.  Pt. currently SR  Sinusitis  UTI (urinary tract infection): Pt. was recently hospitalized in [place] two weeks ago tx with ABx  Essential hypertension: HTN (hypertension)  Cerebral artery occlusion with cerebral infarction: CVA (cerebral infarction)  Hyperlipidemia: HLD (hyperlipidemia)  Memory loss: Memory loss  Fall: Falls  No significant past surgical history    PHYSICAL EXAM:  T(C): 36.2 (2018 13:55), Max: 37.3 (2018 06:02)  HR: 63 (2018 20:32)  BP: 145/92 (2018 20:32) (95/55 - 145/92)  RR: 16 (2018 20:32)  SpO2: 94% (2018 20:32)      2018 07:01  -  2018 07:00  --------------------------------------------------------  IN:    IV PiggyBack: 50 mL    Oral Fluid: 220 mL    sodium chloride 0.9%: 240 mL  Total IN: 510 mL    OUT:    Voided: 600 mL  Total OUT: 600 mL    Total NET: -90 mL      2018 07:01  -  2018 21:38  --------------------------------------------------------  IN:    IV PiggyBack: 50 mL    Oral Fluid: 300 mL    sodium chloride 0.9%.: 80 mL  Total IN: 430 mL    OUT:  Total OUT: 0 mL    Total NET: 430 mL        Weight 57.1 (2018 18:51)    Appearance: alert, pleasant, INAD.  ENT: oral mucosa moist, no pallor/cyanosis.  Neck: no JVD visible.  Cardiac: no rubs. no murmurs. Extremities: NO EDEMA.  Skin: no rashes.  Extremities (digits): no clubbing or cyanosis.  Respratory effort: no access muscle use. Lungs: CLEAR TO AUSCULTATION.  Abdomen: soft. nontender. no masses.  Psych affect: not depressed. Orientation: person, place, situation.     MEDICATIONS  (STANDING):  ampicillin/sulbactam  IVPB 3 Gram(s) IV Intermittent every 6 hours  aspirin enteric coated 81 milliGRAM(s) Oral daily  atorvastatin 10 milliGRAM(s) Oral at bedtime  cholecalciferol 1000 Unit(s) Oral daily  galantamine ER 24 milliGRAM(s) Oral with breakfast  heparin  Injectable 5000 Unit(s) SubCutaneous every 8 hours  levoFLOXacin  Tablet 750 milliGRAM(s) Oral every 48 hours  losartan 100 milliGRAM(s) Oral daily  memantine 5 milliGRAM(s) Oral two times a day  pantoprazole    Tablet 40 milliGRAM(s) Oral before breakfast  sodium chloride 0.9%. 1000 milliLiter(s) (40 mL/Hr) IV Continuous <Continuous>    MEDICATIONS  (PRN):  acetaminophen   Tablet 650 milliGRAM(s) Oral every 6 hours PRN For Temp greater than 38 C (100.4 F)  ALBUTerol/ipratropium for Nebulization 3 milliLiter(s) Nebulizer every 6 hours PRN Shortness of Breath and/or Wheezing    DATA:  131<L>  |  94<L>  |  13     ----------------------------<  100<H>  Ca:8.8   (2018 06:23)  3.8     |  28     |  1.05       eGFR if Non : 46 <L>  eGFR if : 54 <L>    TPro  6.3    /  Alb  3.2<L>  /  TBili  0.3    /  DBili  x      /  AST  35     /  ALT  18     /  AlkPhos  60     2018 06:23    SCr 1.05 [2018 06:23]  SCr 0.90 [2018 06:02]  SCr 1.05 [2018 11:04]  SCr 0.99 [2018 06:41]  SCr 0.97 [2018 07:20]                          9.6<L>  5.6   )-----------( 213      ( 2018 06:24 )             28.0<L>    Phos:-- M.6 mg/dL PTH:-- Uric acid:-- Serum Osm:--  Ferritin:-- Iron:-- TIBC:-- Tsat:--  B12:-- TSH:-- (2018 06:23)    Urinalysis Basic - ( 2018 15:27 )  Color: Yellow / Appearance: Clear / SG: <=1.005 / pH: x  Gluc: x / Ketone: NEGATIVE  / Bili: Negative / Urobili: 0.2 E.U./dL   Blood: x / Protein: NEGATIVE mg/dL / Nitrite: NEGATIVE   Leuk Esterase: Small<!!> / RBC: < 5 /HPF / WBC < 5 /HPF   Sq Epi: x / Non Sq Epi: 0-5 /HPF / Bacteria: Present /HPF<!!>      UProt:-- UCr:27 mg/dL P/C Ratio:-- 24 hour Prot:-- UVol:-- CrCl:--  Anisa:31 mmol/L UOsm:188 mosmol/kg UVol:-- UCl:-- UK:-- (2018 15:27) CC: ALTERED MENTAL STATUSHYPOANTREMIA    INTERVAL HISTORY:    * Lethargic but arousable. Reportedly did not sleep last night. * HTN borderline controlled. * Hyponatremia improved.    ROS: unobtainable lethargic.    PAST MEDICAL & SURGICAL HISTORY:  AS (aortic stenosis)  Atrial fibrillation: Pt. had new onset of Afib two weeks ago while hospitalized for UTI and Sinusitis.  No AC.  Pt. currently SR  Sinusitis  UTI (urinary tract infection): Pt. was recently hospitalized in [place] two weeks ago tx with ABx  Essential hypertension: HTN (hypertension)  Cerebral artery occlusion with cerebral infarction: CVA (cerebral infarction)  Hyperlipidemia: HLD (hyperlipidemia)  Memory loss: Memory loss  Fall: Falls  No significant past surgical history    PHYSICAL EXAM:  T(C): 36.2 (2018 13:55), Max: 37.3 (2018 06:02)  HR: 63 (2018 20:32)  BP: 145/92 (2018 20:32) (95/55 - 145/92)  RR: 16 (2018 20:32)  SpO2: 94% (2018 20:32)      2018 07:01  -  2018 07:00  --------------------------------------------------------  IN:    IV PiggyBack: 50 mL    Oral Fluid: 220 mL    sodium chloride 0.9%: 240 mL  Total IN: 510 mL    OUT:    Voided: 600 mL  Total OUT: 600 mL    Total NET: -90 mL      2018 07:01  -  2018 21:38  --------------------------------------------------------  IN:    IV PiggyBack: 50 mL    Oral Fluid: 300 mL    sodium chloride 0.9%.: 80 mL  Total IN: 430 mL    OUT:  Total OUT: 0 mL    Total NET: 430 mL        Weight 57.1 (2018 18:51)    Appearance: lethargic  ENT: oral mucosa moist, no pallor/cyanosis.  Neck: no JVD visible.  Cardiac: no rubs. no murmurs. Extremities: NO EDEMA.  Skin: no rashes.  Extremities (digits): no clubbing or cyanosis.  Respratory effort: no access muscle use. Lungs: poor po intake.  Abdomen: soft. nontender. no masses.  Psych affect: depressed    MEDICATIONS  (STANDING):  ampicillin/sulbactam  IVPB 3 Gram(s) IV Intermittent every 6 hours  aspirin enteric coated 81 milliGRAM(s) Oral daily  atorvastatin 10 milliGRAM(s) Oral at bedtime  cholecalciferol 1000 Unit(s) Oral daily  galantamine ER 24 milliGRAM(s) Oral with breakfast  heparin  Injectable 5000 Unit(s) SubCutaneous every 8 hours  levoFLOXacin  Tablet 750 milliGRAM(s) Oral every 48 hours  losartan 100 milliGRAM(s) Oral daily  memantine 5 milliGRAM(s) Oral two times a day  pantoprazole    Tablet 40 milliGRAM(s) Oral before breakfast  sodium chloride 0.9%. 1000 milliLiter(s) (40 mL/Hr) IV Continuous <Continuous>    MEDICATIONS  (PRN):  acetaminophen   Tablet 650 milliGRAM(s) Oral every 6 hours PRN For Temp greater than 38 C (100.4 F)  ALBUTerol/ipratropium for Nebulization 3 milliLiter(s) Nebulizer every 6 hours PRN Shortness of Breath and/or Wheezing    DATA:  131<L>  |  94<L>  |  13     ----------------------------<  100<H>  Ca:8.8   (2018 06:23)  3.8     |  28     |  1.05       eGFR if Non : 46 <L>  eGFR if : 54 <L>    TPro  6.3    /  Alb  3.2<L>  /  TBili  0.3    /  DBili  x      /  AST  35     /  ALT  18     /  AlkPhos  60     2018 06:23    SCr 1.05 [2018 06:23]  SCr 0.90 [2018 06:02]  SCr 1.05 [2018 11:04]  SCr 0.99 [2018 06:41]  SCr 0.97 [2018 07:20]                          9.6<L>  5.6   )-----------( 213      ( 2018 06:24 )             28.0<L>    Phos:-- M.6 mg/dL PTH:-- Uric acid:-- Serum Osm:--  Ferritin:-- Iron:-- TIBC:-- Tsat:--  B12:-- TSH:-- (2018 06:23)    Urinalysis Basic - ( 2018 15:27 )  Color: Yellow / Appearance: Clear / SG: <=1.005 / pH: x  Gluc: x / Ketone: NEGATIVE  / Bili: Negative / Urobili: 0.2 E.U./dL   Blood: x / Protein: NEGATIVE mg/dL / Nitrite: NEGATIVE   Leuk Esterase: Small<!!> / RBC: < 5 /HPF / WBC < 5 /HPF   Sq Epi: x / Non Sq Epi: 0-5 /HPF / Bacteria: Present /HPF<!!>      UProt:-- UCr:27 mg/dL P/C Ratio:-- 24 hour Prot:-- UVol:-- CrCl:--  Anisa:31 mmol/L UOsm:188 mosmol/kg UVol:-- UCl:-- UK:-- (2018 15:27)

## 2018-02-08 NOTE — PROGRESS NOTE ADULT - PROBLEM SELECTOR PLAN 5
- Normotensive currently, monitor.  - Losartan with holding parameters, holding HCTZ 2/2 hyponatremia SBP stable, Continue Losartan 100mg QD.   - holding HCTZ 2/2 hyponatremia

## 2018-02-08 NOTE — PROGRESS NOTE ADULT - PROBLEM SELECTOR PLAN 2
Concern for SSS, EP service consulted.   - Holter monitor @ home with eb to 40s, no runs of afib noted  - Echo 1/16/18 with NL EF 60-65%, NL wall motion, No HD sig valvular disease  - Tele shows NSR 60s-70s, no pauses or afib while at [facility]  - Note mentions wanting to start AC, please discuss further Concern for SSS, EP service consulted.   - Holter monitor @ home with eb to 40s, no runs of afib noted  - Echo 1/16/18 with NL EF 60-65%, NL wall motion, No HD sig valvular disease  - Tele shows NSR 60s-70s, no pauses or afib while at [facility]  - Per EP Recs consider starting A/C (Continue to hold for now as per Dr. Stahl).  f/u Recs regarding loop recorder.

## 2018-02-08 NOTE — PROGRESS NOTE ADULT - SUBJECTIVE AND OBJECTIVE BOX
Neurology Follow up note    Name  SAVANAH ISAAC    HPI:  89 y/o active female (lives alone w/24h HHA) w/ PMHx of HTN, HLD, Dementia (A&Ox1), hx Cerebral Artery Occlusion, pAfib (no AC, pt. new onset ~1mo ago 2/2 to UTI/Pyelo in [place]; currently SR) presented to [facility] ED 1/16 after change in functional status / lethargy per family. Pt's son stated that upon arriving to her apt this AM to take her to breakfast, she was slumped over and lethargic. Normally she walks on her own, but she needed her son and daughter-in-law to help her down the stairs. The aid reported that she had a decreased PO intake over the past few days but no new symptoms or complaints aside from mild back pain since her last admission to [facility] for syncope work up. Denied fever, chills, recent illness, sick contacts, chest pain, SOB, HAYDEN, orthopnea, LE edema, n/v/d, abd pain, dysuria, hematuria. Pt herself denies any symptoms however A&Ox1. Of note, patient was recently hospitalized December 2017 in [place], [place] for UTI/Pyelo and sinusitis and treated with IV abx, and upon return to [place], had a syncopal episode 1/15 and was admitted to [facility] with a negative work up including CTA brain, Echo 1/16 revealing NL LV wall motion, LVEF NL 60-65%, No AS. Carotid US revealed mild-mod dz, TSH WNL, and UA repeated which was negative for UTI and urine culture negative. Pt was given a Holter Monitor otpt by Dr. Stahl which revealed no atrial fibrillation, but bradycardia to 40s. In the ED today VSS, afebrile, ECG NSR @ 61 BPM no acute STT changes, CT Head unchanged, labs sig for troponin negative x1, Na 126, K 3.8. Pt will be admitted to [facility] for further monitoring, EP consult, and work up for possible sick sinus syndrome. (06 Feb 2018 13:03)      Interval History - continues to be confused- poor memory        REVIEW OF SYSTEMS    Vital Signs Last 24 Hrs  T(C): 37.3 (08 Feb 2018 06:02), Max: 38.3 (07 Feb 2018 16:32)  T(F): 99.2 (08 Feb 2018 06:02), Max: 101 (07 Feb 2018 16:32)  HR: 67 (08 Feb 2018 06:30) (67 - 82)  BP: 135/66 (08 Feb 2018 06:30) (105/68 - 144/74)  BP(mean): 91 (08 Feb 2018 06:30) (88 - 98)  RR: 18 (08 Feb 2018 06:30) (16 - 18)  SpO2: 97% (08 Feb 2018 06:30) (94% - 97%)    Physical Exam-     Mental Status- oriented to person and place    Cranial Nerves-full EOM    Gait and station-n/a    Motor- moves all 4 extremities    Reflexes- decreased    Sensation-no sensory level    Coordination-no tremors    Vascular -no bruits    Medications  acetaminophen   Tablet 650 milliGRAM(s) Oral every 6 hours PRN  ampicillin/sulbactam  IVPB      ampicillin/sulbactam  IVPB 3 Gram(s) IV Intermittent every 12 hours  aspirin enteric coated 81 milliGRAM(s) Oral daily  atorvastatin 10 milliGRAM(s) Oral at bedtime  cholecalciferol 1000 Unit(s) Oral daily  heparin  Injectable 5000 Unit(s) SubCutaneous every 8 hours  levoFLOXacin  Tablet 750 milliGRAM(s) Oral every 48 hours  losartan 100 milliGRAM(s) Oral daily  magnesium sulfate  IVPB 2 Gram(s) IV Intermittent once  memantine 5 milliGRAM(s) Oral two times a day  pantoprazole    Tablet 40 milliGRAM(s) Oral before breakfast  potassium chloride   Powder 20 milliEquivalent(s) Oral once  sodium chloride 0.9%. 1000 milliLiter(s) IV Continuous <Continuous>      Lab      Radiology    Assessment- Altered mental status- no focal deficits    Plan- as per primary team

## 2018-02-08 NOTE — CONSULT NOTE ADULT - SUBJECTIVE AND OBJECTIVE BOX
Patient is a 89 year old  White female with a history of aortic stenosis, recent onset of Afib no AC, HTN, recent UTI, Cerebral infarct, HLD and memory loss who was admitted to [facility] on 2/06/18 after she was found by her 24 hour Home Health Aide slumped in a chair in the morning and was only minimally responsive. Patient had been treated for UTI 4 weeks ago while in [place] and was hospitalized on 1/16/18 after she developed Afib. Since returning home memory loss was noted to have worsened. Following admission patient was determined to have pneumonia in the right lung and was started on IV Levaquin. Patient has since become more alert and is now able to sit up in a chair.       Mental Status : Patient is a 89 year old White female of medium build who appears younger than her stated age who is sitting up in a chair and is engagable.Patient is oriented to person and knows she is in the hospital Speech is clear but sparse. Affect is constricted. Mood is depressed Thought processes are disorganized. There are no auditory or visual hallucinations. There is no suicidal or homicidal ideation Recent memory is impaired as well as remote memory.       Impression: Toxic - Metabolic Encephalapathy                        Dementia         Recommend Continue present medical management; Supportive therapy; Will lorie.

## 2018-02-09 DIAGNOSIS — R91.1 SOLITARY PULMONARY NODULE: ICD-10-CM

## 2018-02-09 DIAGNOSIS — J18.9 PNEUMONIA, UNSPECIFIED ORGANISM: ICD-10-CM

## 2018-02-09 DIAGNOSIS — R00.1 BRADYCARDIA, UNSPECIFIED: ICD-10-CM

## 2018-02-09 DIAGNOSIS — R63.8 OTHER SYMPTOMS AND SIGNS CONCERNING FOOD AND FLUID INTAKE: ICD-10-CM

## 2018-02-09 DIAGNOSIS — Z29.9 ENCOUNTER FOR PROPHYLACTIC MEASURES, UNSPECIFIED: ICD-10-CM

## 2018-02-09 LAB
ANION GAP SERPL CALC-SCNC: 10 MMOL/L — SIGNIFICANT CHANGE UP (ref 5–17)
BUN SERPL-MCNC: 15 MG/DL — SIGNIFICANT CHANGE UP (ref 7–23)
CALCIUM SERPL-MCNC: 9 MG/DL — SIGNIFICANT CHANGE UP (ref 8.4–10.5)
CHLORIDE SERPL-SCNC: 99 MMOL/L — SIGNIFICANT CHANGE UP (ref 96–108)
CO2 SERPL-SCNC: 28 MMOL/L — SIGNIFICANT CHANGE UP (ref 22–31)
CREAT SERPL-MCNC: 0.93 MG/DL — SIGNIFICANT CHANGE UP (ref 0.5–1.3)
GLUCOSE SERPL-MCNC: 99 MG/DL — SIGNIFICANT CHANGE UP (ref 70–99)
HCT VFR BLD CALC: 30.2 % — LOW (ref 34.5–45)
HGB BLD-MCNC: 10.2 G/DL — LOW (ref 11.5–15.5)
MAGNESIUM SERPL-MCNC: 2.1 MG/DL — SIGNIFICANT CHANGE UP (ref 1.6–2.6)
MCHC RBC-ENTMCNC: 30.4 PG — SIGNIFICANT CHANGE UP (ref 27–34)
MCHC RBC-ENTMCNC: 33.8 G/DL — SIGNIFICANT CHANGE UP (ref 32–36)
MCV RBC AUTO: 90.1 FL — SIGNIFICANT CHANGE UP (ref 80–100)
PLATELET # BLD AUTO: 229 K/UL — SIGNIFICANT CHANGE UP (ref 150–400)
POTASSIUM SERPL-MCNC: 4.3 MMOL/L — SIGNIFICANT CHANGE UP (ref 3.5–5.3)
POTASSIUM SERPL-SCNC: 4.3 MMOL/L — SIGNIFICANT CHANGE UP (ref 3.5–5.3)
RBC # BLD: 3.35 M/UL — LOW (ref 3.8–5.2)
RBC # FLD: 14.1 % — SIGNIFICANT CHANGE UP (ref 10.3–16.9)
SODIUM SERPL-SCNC: 137 MMOL/L — SIGNIFICANT CHANGE UP (ref 135–145)
WBC # BLD: 4.2 K/UL — SIGNIFICANT CHANGE UP (ref 3.8–10.5)
WBC # FLD AUTO: 4.2 K/UL — SIGNIFICANT CHANGE UP (ref 3.8–10.5)

## 2018-02-09 PROCEDURE — 93010 ELECTROCARDIOGRAM REPORT: CPT

## 2018-02-09 PROCEDURE — 99222 1ST HOSP IP/OBS MODERATE 55: CPT

## 2018-02-09 PROCEDURE — 99233 SBSQ HOSP IP/OBS HIGH 50: CPT

## 2018-02-09 RX ORDER — PIPERACILLIN AND TAZOBACTAM 4; .5 G/20ML; G/20ML
3.38 INJECTION, POWDER, LYOPHILIZED, FOR SOLUTION INTRAVENOUS EVERY 6 HOURS
Qty: 0 | Refills: 0 | Status: DISCONTINUED | OUTPATIENT
Start: 2018-02-09 | End: 2018-02-12

## 2018-02-09 RX ORDER — SODIUM CHLORIDE 9 MG/ML
1000 INJECTION, SOLUTION INTRAVENOUS
Qty: 0 | Refills: 0 | Status: DISCONTINUED | OUTPATIENT
Start: 2018-02-09 | End: 2018-02-09

## 2018-02-09 RX ORDER — PIPERACILLIN AND TAZOBACTAM 4; .5 G/20ML; G/20ML
3.38 INJECTION, POWDER, LYOPHILIZED, FOR SOLUTION INTRAVENOUS EVERY 6 HOURS
Qty: 0 | Refills: 0 | Status: DISCONTINUED | OUTPATIENT
Start: 2018-02-09 | End: 2018-02-09

## 2018-02-09 RX ADMIN — AMPICILLIN SODIUM AND SULBACTAM SODIUM 200 GRAM(S): 250; 125 INJECTION, POWDER, FOR SUSPENSION INTRAMUSCULAR; INTRAVENOUS at 00:05

## 2018-02-09 RX ADMIN — LOSARTAN POTASSIUM 100 MILLIGRAM(S): 100 TABLET, FILM COATED ORAL at 06:36

## 2018-02-09 RX ADMIN — Medication 81 MILLIGRAM(S): at 12:12

## 2018-02-09 RX ADMIN — PIPERACILLIN AND TAZOBACTAM 200 GRAM(S): 4; .5 INJECTION, POWDER, LYOPHILIZED, FOR SOLUTION INTRAVENOUS at 19:29

## 2018-02-09 RX ADMIN — HEPARIN SODIUM 5000 UNIT(S): 5000 INJECTION INTRAVENOUS; SUBCUTANEOUS at 06:36

## 2018-02-09 RX ADMIN — Medication 1000 UNIT(S): at 12:12

## 2018-02-09 RX ADMIN — PANTOPRAZOLE SODIUM 40 MILLIGRAM(S): 20 TABLET, DELAYED RELEASE ORAL at 06:36

## 2018-02-09 RX ADMIN — HEPARIN SODIUM 5000 UNIT(S): 5000 INJECTION INTRAVENOUS; SUBCUTANEOUS at 13:31

## 2018-02-09 RX ADMIN — HEPARIN SODIUM 5000 UNIT(S): 5000 INJECTION INTRAVENOUS; SUBCUTANEOUS at 22:58

## 2018-02-09 RX ADMIN — AMPICILLIN SODIUM AND SULBACTAM SODIUM 200 GRAM(S): 250; 125 INJECTION, POWDER, FOR SUSPENSION INTRAMUSCULAR; INTRAVENOUS at 06:36

## 2018-02-09 RX ADMIN — ATORVASTATIN CALCIUM 10 MILLIGRAM(S): 80 TABLET, FILM COATED ORAL at 22:58

## 2018-02-09 RX ADMIN — PIPERACILLIN AND TAZOBACTAM 200 GRAM(S): 4; .5 INJECTION, POWDER, LYOPHILIZED, FOR SOLUTION INTRAVENOUS at 13:30

## 2018-02-09 RX ADMIN — MEMANTINE HYDROCHLORIDE 5 MILLIGRAM(S): 10 TABLET ORAL at 19:29

## 2018-02-09 RX ADMIN — SODIUM CHLORIDE 60 MILLILITER(S): 9 INJECTION, SOLUTION INTRAVENOUS at 16:07

## 2018-02-09 RX ADMIN — MEMANTINE HYDROCHLORIDE 5 MILLIGRAM(S): 10 TABLET ORAL at 06:37

## 2018-02-09 NOTE — CONSULT NOTE ADULT - PROBLEM SELECTOR RECOMMENDATION 3
a 14 mm ground glass opacity seen in the right upper lobe   recommendation-   -the nodule has been stable since 1/21/2016.   -no acute intervention considering patient's age and comorbidities   -can follow up as an outpatient with Dr Moya

## 2018-02-09 NOTE — PROGRESS NOTE ADULT - PROBLEM SELECTOR PLAN 4
-Dr. Cates   - Na 126 on admission likely in the setting of decreased PO intake over the past few days and taking HCTZ at home.  -NA improved: 137 mmol/L today; s/p gentle IV hydration (2/9/2018)  -Will continue to hold HCTZ therapy as discussed with Dr. Cates and encourage PO intake.

## 2018-02-09 NOTE — PROGRESS NOTE ADULT - PROBLEM SELECTOR PLAN 3
Dr Bernabe consulted (Dr. Gan following today), continued productive cough, denies SOB, satting well RA.  no leukocytosis, afebrile today  - CXR positive for right hilar infiltrate concerning for aspiration pneumonia.   - CT Chest 2/7: Right middle lobe and right lower lobe consolidation consistent with   multifocal pneumonia, Small right pleural effusion.  14 mm groundglass opacity right upper lobe stable since 1/21/2016 (f/u CT 1yr).   - Continue Unasyn 3mg BID and Levaquin 750mg every 48 hours started   - Sputum culture ordered.  - Recent sinus infection, Xray sinuses performed, f/u results.    - Speech and swallow consulted and cleared patient for regular diet.   - Continue Aspiration precautions, incentive spirometry. - H/o syncopal episode with negative work up 1/16/18 @ [facility]  -Episode of sinus bradycardia on outpatient Holter monitor.   -Telemetry monitoring this admission: revealed no eb or tachy arrhythmias, patient maintained sinus rhythm, no episodes of  atrial fibrillation during monitoring.   -Will not proceed with any EP intervention at this time.   - Prior Echo 1/16/18 with NL EF 60-65%, NL wall motion, No HD sig valvular disease.

## 2018-02-09 NOTE — PROGRESS NOTE ADULT - SUBJECTIVE AND OBJECTIVE BOX
Physical Medicine and Rehabilitation Progress Note:    Patient is a 89y old  Female who presents with a chief complaint of     HPI:  89 y/o active female (lives alone w/24h HHA) w/ PMHx of HTN, HLD, Dementia (A&Ox1), hx Cerebral Artery Occlusion, pAfib (no AC, pt. new onset ~1mo ago 2/2 to UTI/Pyelo in [place]; currently SR) presented to [facility] ED 1/16 after change in functional status / lethargy per family. Pt's son stated that upon arriving to her apt this AM to take her to breakfast, she was slumped over and lethargic. Normally she walks on her own, but she needed her son and daughter-in-law to help her down the stairs. The aid reported that she had a decreased PO intake over the past few days but no new symptoms or complaints aside from mild back pain since her last admission to [facility] for syncope work up. Denied fever, chills, recent illness, sick contacts, chest pain, SOB, HAYDEN, orthopnea, LE edema, n/v/d, abd pain, dysuria, hematuria. Pt herself denies any symptoms however A&Ox1. Of note, patient was recently hospitalized December 2017 in [place], [place] for UTI/Pyelo and sinusitis and treated with IV abx, and upon return to [place], had a syncopal episode 1/15 and was admitted to [facility] with a negative work up including CTA brain, Echo 1/16 revealing NL LV wall motion, LVEF NL 60-65%, No AS. Carotid US revealed mild-mod dz, TSH WNL, and UA repeated which was negative for UTI and urine culture negative. Pt was given a Holter Monitor otpt by Dr. Stahl which revealed no atrial fibrillation, but bradycardia to 40s. In the ED today VSS, afebrile, ECG NSR @ 61 BPM no acute STT changes, CT Head unchanged, labs sig for troponin negative x1, Na 126, K 3.8. Pt will be admitted to [facility] for further monitoring, EP consult, and work up for possible sick sinus syndrome. (06 Feb 2018 13:03)                            10.2   4.2   )-----------( 229      ( 09 Feb 2018 07:29 )             30.2       02-09    137  |  99  |  15  ----------------------------<  99  4.3   |  28  |  0.93    Ca    9.0      09 Feb 2018 07:29  Mg     2.1     02-09    TPro  6.3  /  Alb  3.2<L>  /  TBili  0.3  /  DBili  x   /  AST  35  /  ALT  18  /  AlkPhos  60  02-08    Vital Signs Last 24 Hrs  T(C): 36.9 (09 Feb 2018 17:00), Max: 36.9 (09 Feb 2018 17:00)  T(F): 98.5 (09 Feb 2018 17:00), Max: 98.5 (09 Feb 2018 17:00)  HR: 62 (09 Feb 2018 17:00) (62 - 84)  BP: 121/71 (09 Feb 2018 17:00) (82/53 - 145/92)  BP(mean): --  RR: 16 (09 Feb 2018 17:00) (16 - 19)  SpO2: 96% (09 Feb 2018 17:00) (93% - 99%)    MEDICATIONS  (STANDING):  aspirin enteric coated 81 milliGRAM(s) Oral daily  atorvastatin 10 milliGRAM(s) Oral at bedtime  cholecalciferol 1000 Unit(s) Oral daily  galantamine ER 24 milliGRAM(s) Oral with breakfast  heparin  Injectable 5000 Unit(s) SubCutaneous every 8 hours  losartan 100 milliGRAM(s) Oral daily  memantine 5 milliGRAM(s) Oral two times a day  pantoprazole    Tablet 40 milliGRAM(s) Oral before breakfast  piperacillin/tazobactam IVPB. 3.375 Gram(s) IV Intermittent every 6 hours    MEDICATIONS  (PRN):  acetaminophen   Tablet 650 milliGRAM(s) Oral every 6 hours PRN For Temp greater than 38 C (100.4 F)  ALBUTerol/ipratropium for Nebulization 3 milliLiter(s) Nebulizer every 6 hours PRN Shortness of Breath and/or Wheezing    Currently Undergoing Physical Therapy at bedside.    Current Functional Status Assessment:      Therapeutic Interventions      Bed Mobility  Bed Mobility Training Bridging: verbal cues;  1 person assist;  minimum assist (75% patient effort)  Bed Mobility Training Sit-to-Supine: minimum assist (75% patient effort);  1 person assist  Bed Mobility Training Limitations: decreased ability to follow commands;  decreased strength    Sit-Stand Transfer Training  Transfer Training Sit-to-Stand Transfer: contact guard;  1 person assist;  full weight-bearing  Transfer Training Stand-to-Sit Transfer: contact guard;  1 person assist;  full weight-bearing  Sit-to-Stand Transfer Training Transfer Safety Analysis: decreased sequencing ability    Gait Training  Gait Training: contact guard;  1 person assist;  full weight-bearing   3steps   Gait Analysis: decreased strength        PM&R Impression: as above    Disposition Plan Recommendations: d/c home with outpatient therapy

## 2018-02-09 NOTE — PROGRESS NOTE ADULT - SUBJECTIVE AND OBJECTIVE BOX
PGY 1 Transfer Acceptance note    Hospital Course:   89 y/o F with a PMHx of HTN, HLD, Dementia, Cerebral artery occlusion, AS, pAfib (no AC) and recent hospitalization in [place] for UTI/Pyelo and Sinusitis X 4days (tx with Abx) two weeks ago while on vacation with family. Pt presented with lethargy, recent decreased PO intake, and altered mental status. Of note, the pt had recent  admission with a negative work up including CTA brain, Echo 1/16 LVEF NL 60-65%, No AS. Carotid US revealed mild-mod dz, Pt was given a Holter Monitor outpatient by Dr. Stahl which revealed no atrial fibrillation, but bradycardia to 40s. In the ED 2/6/2018, VSS, ECG NSR @ 61 BPM no acute STT changes, CT Head unchanged, neg troponins. Pt was admitted to [facility] for further work up of altered mental status, lethargy and decreased PO intake. During hospitalization, pt remained in NSR with no afib, EP consulted and no need for intervention at this time. CXR on admission positive for right hilar infiltrate concerning for aspiration pneumonia. CT Chest 2/7: Right middle lobe and right lower lobe consolidation consistent with multifocal pneumonia, Small right pleural effusion.  14 mm groundglass opacity right upper lobe stable since 1/21/2016 (f/u CT 1yr).  Pt started on abx for treatment and broadened to Zosyn for broader coverage per Dr. Gan. Pt slightly hyponatremic on exam to 126 that resolved after gentle IV hydration and d/c of HCTZ. At this time, patient is to be transferred to Dr. Barahona service for further IV ABx therapy.

## 2018-02-09 NOTE — PROGRESS NOTE ADULT - PROBLEM SELECTOR PLAN 2
Concern for SSS, EP service consulted.   - Holter monitor @ home with eb to 40s, no runs of afib noted  - Echo 1/16/18 with NL EF 60-65%, NL wall motion, No HD sig valvular disease  - Tele shows NSR 60s-70s, no pauses or afib while at [facility]  - Per EP Recs consider starting A/C (Continue to hold for now as per Dr. Stahl).  f/u Recs regarding loop recorder. -Dr Bernabe consulted (Dr. Gan following today).   -Continued intermittent productive cough, denies SOB, no leukocytosis. RVP (2/7/2018): negative  - CXR on admission: right hilar infiltrate concerning for aspiration pneumonia.   - CT Chest 2/7: Right middle lobe and right lower lobe consolidation consistent with multifocal pneumonia, Small right pleural effusion.  14 mm groundglass opacity right upper lobe stable since 1/21/2016 (f/u CT 1yr).   -Patient seen and examined at bedside this morning with Dr. Gan; clinically improved and lungs CTA.   -Continue Levaquin 750mg orally every 48 hours and Unasyn 3mg IV BID switched to Zosyn 3.375 mg IV [id number].  DuoNeb prn.   - Recent sinus infection, Paranasal X Ray (2/8/2018): No radiographic evidence of sinus inflammatory disease.    - Speech and swallow consulted and cleared patient for regular diet.   - Continue Aspiration precautions, incentive spirometry. -Dr Bernabe consulted (Dr. Gan following today).   -Continued intermittent productive cough, denies SOB, no leukocytosis. RVP (2/7/2018): negative  - CXR on admission: right hilar infiltrate concerning for aspiration pneumonia.   - CT Chest 2/7: Right middle lobe and right lower lobe consolidation consistent with multifocal pneumonia, Small right pleural effusion.  14 mm groundglass opacity right upper lobe stable since 1/21/2016 (f/u CT 1yr).   -Patient seen and examined at bedside this morning with Dr. Gan; clinically improved and lungs CTA.   -Continue Levaquin 750mg orally every 48 hours and Unasyn 3mg IV BID switched to Zosyn 3.375 mg IV [id number].  DuoNeb prn.   - Recent sinus infection, Paranasal X Ray (2/8/2018): No radiographic evidence of sinus inflammatory disease.    - Speech and swallow consulted and cleared patient for regular diet.   - Continue Aspiration precautions, incentive spirometry.  -Per family request as discussed with Dr. Stahl; standing 2L Nasal canula and chest PT TID ordered.  -In addition, patient's family requesting 2nd pulmonary consultation with Dr. Sandoval -Dr Bernabe/Malik initially consult. Patient’s family requesting Dr. Moya for pulomonary care.   -Continued intermittent productive cough, denies SOB, no leukocytosis. RVP (2/7/2018): negative  - CXR on admission: right hilar infiltrate concerning for aspiration pneumonia.   - CT Chest 2/7: Right middle lobe and right lower lobe consolidation consistent with multifocal pneumonia, Small right pleural effusion.  14 mm groundglass opacity right upper lobe stable since 1/21/2016 (f/u CT 1yr).   -Patient seen and examined at bedside this morning with Dr. Gan; clinically improved and lungs CTA.   -Discontinued Levaquin today per Dr. Moya and will continue Zosyn for PNA coverage (to avoid C-Diff)   - Recent sinus infection, Paranasal X Ray (2/8/2018): No radiographic evidence of sinus inflammatory disease.    - Speech and swallow consulted and cleared patient for regular diet.   - Continue Aspiration precautions, incentive spirometry.  -Per family request as discussed with Dr. Stahl; standing 2L Nasal canula and chest PT TID ordered.  -In addition, patient's family requesting Dr. Moya to follow from pulmonary.

## 2018-02-09 NOTE — PROGRESS NOTE ADULT - PROBLEM SELECTOR PLAN 7
DVT PPX: Hep SC  Enhanced supervision.  PT/SW: recommending home w/ home PT and 24hr Aid.  Given recent change in function status Dr. Kramer consulted.  f/u Recs. PT evaluation patient: recommends home PT and currently has 24 hour aide at home.   -Dr. Kramer consulted    Dispo: Continue IV ABx; transfer to Dr. Barahona’s service.

## 2018-02-09 NOTE — PROGRESS NOTE ADULT - PROBLEM SELECTOR PLAN 3
- H/o syncopal episode with negative work up 1/16/18 @ [facility]  -Episode of sinus bradycardia on outpatient Holter monitor.   -Telemetry monitoring this admission: revealed no eb or tachy arrhythmias, patient maintained sinus rhythm, no episodes of  atrial fibrillation during monitoring.   -Will not proceed with any EP intervention at this time.   - Prior Echo 1/16/18 with NL EF 60-65%, NL wall motion, No HD sig valvular disease.

## 2018-02-09 NOTE — PROGRESS NOTE ADULT - ASSESSMENT
pt responding well and is now euvolemic and eunatremic.   discussed with pt's dtr and staff at length.   will continue current rx, to include her drinking in response to thirst only.

## 2018-02-09 NOTE — PROGRESS NOTE ADULT - ASSESSMENT
89 y/o F with a PMHx of HTN, HLD, Dementia, Cerebral artery occlusion, AS, pAfib (no AC) and recent hospitalization in [place] for UTI/Pyelo and Sinusitis X 4days (tx with Abx) two weeks ago while on vacation with family. Pt presented with lethargy, recent decreased PO intake, and altered mental status. Admitted for hyponatremia and aspiration PNA tx

## 2018-02-09 NOTE — PROGRESS NOTE ADULT - SUBJECTIVE AND OBJECTIVE BOX
CC: ALTERED MENTAL STATUS  // HYPONATREMIA      INTERVAL HISTORY:89 yo lady with recent bout A fib, htn, cerebrovascular dis, and other comorbidities as outlined.  readm with pna, and found to have hyponatremia which has now resolved with saline, free water restriction, and cessation of thiazides.  pt now looks and feels better, is alert, eating , and interacting with family and staff.      ROS: No chest pain. No shortness of breath. No nausea.    PAST MEDICAL & SURGICAL HISTORY:  AS (aortic stenosis)  Atrial fibrillation: Pt. had new onset of Afib two weeks ago while hospitalized for UTI and Sinusitis.  No AC.  Pt. currently SR  Sinusitis  UTI (urinary tract infection): Pt. was recently hospitalized in [place] two weeks ago tx with ABx  Essential hypertension: HTN (hypertension)  Cerebral artery occlusion with cerebral infarction: CVA (cerebral infarction)  Hyperlipidemia: HLD (hyperlipidemia)  Memory loss: Memory loss  Fall: Falls  No significant past surgical history      PHYSICAL EXAM:  T(C): 36.3 (02-10-18 @ 16:16), Max: 36.8 (02-10-18 @ 05:20)  HR: 83 (02-10-18 @ 16:16)  BP: 100/62 (02-10-18 @ 16:16) (100/62 - 148/74)  RR: 18 (02-10-18 @ 16:16)  SpO2: 94% (02-10-18 @ 16:16)  Wt(kg): --  I&O's Summary    09 Feb 2018 07:01  -  10 Feb 2018 07:00  --------------------------------------------------------  IN: 240 mL / OUT: 310 mL / NET: -70 mL      Weight 57.1 (02-06 @ 18:51)    Appearance: alert, pleasant, INAD.  ENT: oral mucosa moist, no pallor/cyanosis.  Neck: no JVD visible.  Cardiac: no rubs. no murmurs. Extremities: NO EDEMA.  Skin: no rashes.  Extremities (digits): no clubbing or cyanosis.  Respratory effort: no access muscle use. Lungs: left basilar rales now resolved.  Abdomen: soft. nontender. no masses.  Psych affect: not depressed. Orientation: person, place, situation.     MEDICATIONS  (STANDING):  aspirin enteric coated 81 milliGRAM(s) Oral daily  atorvastatin 10 milliGRAM(s) Oral at bedtime  cholecalciferol 1000 Unit(s) Oral daily  galantamine ER 24 milliGRAM(s) Oral with breakfast  heparin  Injectable 5000 Unit(s) SubCutaneous every 8 hours  losartan 100 milliGRAM(s) Oral daily  memantine 5 milliGRAM(s) Oral two times a day  pantoprazole    Tablet 40 milliGRAM(s) Oral before breakfast  piperacillin/tazobactam IVPB. 3.375 Gram(s) IV Intermittent every 6 hours    MEDICATIONS  (PRN):  acetaminophen   Tablet 650 milliGRAM(s) Oral every 6 hours PRN For Temp greater than 38 C (100.4 F)  ALBUTerol/ipratropium for Nebulization 3 milliLiter(s) Nebulizer every 6 hours PRN Shortness of Breath and/or Wheezing      DATA:  136    |  100    |  18     ----------------------------<  97     Ca:9.0   (10 Feb 2018 08:06)  4.4     |  23     |  1.15       eGFR if Non : 42 <L>  eGFR if : 48 <L>    TPro  6.3    /  Alb  3.2<L>  /  TBili  0.3    /  DBili  x      /  AST  35     /  ALT  18     /  AlkPhos  60     08 Feb 2018 06:23                        10.2<L>  4.8   )-----------( 273      ( 10 Feb 2018 08:06 )             30.2<L>        Urinalysis Basic - ( 06 Feb 2018 15:27 )  Color: Yellow / Appearance: Clear / SG: <=1.005 / pH: x  Gluc: x / Ketone: NEGATIVE  / Bili: Negative / Urobili: 0.2 E.U./dL   Blood: x / Protein: NEGATIVE mg/dL / Nitrite: NEGATIVE   Leuk Esterase: Small<!!> / RBC: < 5 /HPF / WBC < 5 /HPF   Sq Epi: x / Non Sq Epi: 0-5 /HPF / Bacteria: Present /HPF<!!>

## 2018-02-09 NOTE — PROGRESS NOTE ADULT - PROBLEM SELECTOR PLAN 1
likely 2/2 hyponatremia and aspiration pneumonia.   - CT head (2/6/2018): negative on this admission.    - TSH WNL  - Carotid US no HD sig stenosis.   - Dr Mcrae has no further neuro recommendations  - Psychiatry (Dr. Shaikh) consulted for altered mental status/Demenita: Continue Namenda 5mg BID, Galantamine (family to bring)  - Continue Enhanced supervision/fall risk.   - Speech and swallow consulted and cleared patient for regular diet.  - Aspiration precautions

## 2018-02-09 NOTE — PROGRESS NOTE ADULT - PROBLEM SELECTOR PLAN 7
PT evaluation patient: recommends home PT and currently has 24 hour aide at home.   -Dr. Kramer consulted    Dispo: Continue IV ABx; transfer to Dr. Barahona’s service.

## 2018-02-09 NOTE — PROGRESS NOTE ADULT - PROBLEM SELECTOR PLAN 1
Change in Functional Status / Found acutely lethargic @ home. likely 2/2 hyponatremia and aspiration pneumonia.   - UA shows blood but no RBCs (likely rhabdomyolysis which was tx with IVF).   - h/o syncopal episode with negative work up 1/16/18 @ [facility]  - CT head negative on this admission. Echo NL.  - TSH WNL  - Carotid US no HD sig stenosis.   - Dr Mcrae has no further neuro recommendations  - Psychiatry (Dr. Shaikh) consulted for altered mental status/Demenita.  f/u Recs.  - Continue dementia medication.  Namenda 5mg BID, Galantamine (family to bring)  - Continue Enhanced supervision/fall risk. -Dr. Mcrae/Dr. Shaikh consulted  -Change in Functional Status / Found acutely lethargic @ home likely 2/2 hyponatremia and aspiration pneumonia.   - CT head (2/6/2018): negative on this admission.    - TSH WNL  - Carotid US no HD sig stenosis.   - Dr Mcrae has no further neuro recommendations  - Psychiatry (Dr. Shaikh) consulted for altered mental status/Demenita: Continue Namenda 5mg BID, Galantamine (family to bring)  - Continue Enhanced supervision/fall risk.   - Speech and swallow consulted and cleared patient for regular diet.

## 2018-02-09 NOTE — PROGRESS NOTE ADULT - SUBJECTIVE AND OBJECTIVE BOX
I examined the patient today, reviewed the lab data, xrays and additional studies. Additionally I have reviewed the notes and assessments by the residents and consultants.   At this point I agree with the assessments and plan.  I would also advise close observation, and supportive care.  Pul and Cardiac followup

## 2018-02-09 NOTE — PROGRESS NOTE ADULT - PROBLEM SELECTOR PLAN 2
-Dr Bernabe/Malik initially consult. Patient’s family requesting Dr. Moya for pulomonary care.   Continued intermittent productive cough, denies SOB, no leukocytosis. RVP (2/7/2018): negative  - CXR on admission: right hilar infiltrate concerning for aspiration pneumonia.   - CT Chest 2/7: Right middle lobe and right lower lobe consolidation consistent with multifocal pneumonia, Small right pleural effusion.  14 mm groundglass opacity right upper lobe stable since 1/21/2016 (f/u CT 1yr).   Per Dr. Gan; clinically improved and lungs CTA.   -Discontinued Levaquin today per Dr. Moya and will continue Zosyn for PNA coverage (to avoid C-Diff)   - Recent sinus infection, Paranasal X Ray (2/8/2018): No radiographic evidence of sinus inflammatory disease.    -Per family request as discussed with Dr. Stahl; standing 2L Nasal canula and chest PT TID ordered.

## 2018-02-09 NOTE — PROGRESS NOTE ADULT - ASSESSMENT
89 y/o F with a PMHx of HTN, HLD, Dementia, Cerebral artery occlusion, AS, pAfib (no AC) and recent hospitalization in [place] for UTI/Pyelo and Sinusitis X 4days (tx with Abx) two weeks ago while on vacation with family. Pt presented with lethargy, recent decreased PO intake, and altered mental status. Admitted for hyponatremia and aspiration PNA tx    Problem/Plan - 1:  ·  Problem: Altered mental status, unspecified altered mental status type.  Plan: likely 2/2 hyponatremia and aspiration pneumonia.   - CT head (2/6/2018): negative on this admission.    - TSH WNL  - Carotid US no HD sig stenosis.   - Dr Mcrae has no further neuro recommendations  - Psychiatry (Dr. Shaikh) consulted for altered mental status/Demenita: Continue Namenda 5mg BID, Galantamine (family to bring)  - Continue Enhanced supervision/fall risk.   - Speech and swallow consulted and cleared patient for regular diet.  - Aspiration precautions.     Problem/Plan - 2:  ·  Problem: Pneumonia, aspiration.  Plan: -Dr Bernabe/Malik initially consult. Patient’s family requesting Dr. Moya for pulomonary care.   Continued intermittent productive cough, denies SOB, no leukocytosis. RVP (2/7/2018): negative  - CXR on admission: right hilar infiltrate concerning for aspiration pneumonia.   - CT Chest 2/7: Right middle lobe and right lower lobe consolidation consistent with multifocal pneumonia, Small right pleural effusion.  14 mm groundglass opacity right upper lobe stable since 1/21/2016 (f/u CT 1yr).   Per Dr. Gan; clinically improved and lungs CTA.   -Discontinued Levaquin today per Dr. Moya and will continue Zosyn for PNA coverage (to avoid C-Diff)   - Recent sinus infection, Paranasal X Ray (2/8/2018): No radiographic evidence of sinus inflammatory disease.    -Per family request as discussed with Dr. Stahl; standing 2L Nasal canula and chest PT TID ordered.     Problem/Plan - 3:  ·  Problem: Sinus bradycardia.  Plan: - H/o syncopal episode with negative work up 1/16/18 @ [facility]  -Episode of sinus bradycardia on outpatient Holter monitor.   -Telemetry monitoring this admission: revealed no eb or tachy arrhythmias, patient maintained sinus rhythm, no episodes of  atrial fibrillation during monitoring.   -Will not proceed with any EP intervention at this time.   - Prior Echo 1/16/18 with NL EF 60-65%, NL wall motion, No HD sig valvular disease.     Problem/Plan - 4:  ·  Problem: Hyponatremia.  Plan: -Dr. Cates   - Na 126 on admission likely in the setting of decreased PO intake over the past few days and taking HCTZ at home.  -NA improved: 137 mmol/L today; s/p gentle IV hydration (2/9/2018)  -Will continue to hold HCTZ therapy as discussed with Dr. Cates and encourage PO intake.

## 2018-02-09 NOTE — PROGRESS NOTE ADULT - SUBJECTIVE AND OBJECTIVE BOX
CC/ HPI 89 y/o active female with hypertension, dementia,  paroxysmal atrial fibrillation,  admitted with altered mental status, pneumonia, persistent complaint of cough, no dyspnea.    PAST MEDICAL & SURGICAL HISTORY:  AS (aortic stenosis)  Atrial fibrillation: Pt. had new onset of Afib two weeks ago while hospitalized for UTI and Sinusitis.  No AC.  Pt. currently SR  Sinusitis  UTI (urinary tract infection): Pt. was recently hospitalized in [place] two weeks ago tx with ABx  Essential hypertension: HTN (hypertension)  Cerebral artery occlusion with cerebral infarction: CVA (cerebral infarction)  Hyperlipidemia: HLD (hyperlipidemia)  Memory loss: Memory loss  Fall: Falls      SOCHX:  -  alcohol    FMHX: FA/MO  - contributory     ROS reviewed below with positive findings marked (+) :  GEN:  fever, chills ENT: tracheostomy,   epistaxis,  sinusitis COR: CAD, CHF,  +HTN, +dysrhythmia PUL: COPD, ILD, asthma, pneumonia GI: PEG, dysphagia, hemorrhage, other PAULA: kidney disease, electrolyte disorder HEM:  anemia, thrombus, coagulopathy, cancer ENDO:  thyroid disease, diabetes mellitus CNS:  +dementia, +stroke, seizure, PSY:  depression, anxiety, other      MEDICATIONS  (STANDING):  ampicillin/sulbactam  IVPB 3 Gram(s) IV Intermittent every 6 hours  aspirin enteric coated 81 milliGRAM(s) Oral daily  atorvastatin 10 milliGRAM(s) Oral at bedtime  cholecalciferol 1000 Unit(s) Oral daily  galantamine ER 24 milliGRAM(s) Oral with breakfast  heparin  Injectable 5000 Unit(s) SubCutaneous every 8 hours  levoFLOXacin  Tablet 750 milliGRAM(s) Oral every 48 hours  losartan 100 milliGRAM(s) Oral daily  memantine 5 milliGRAM(s) Oral two times a day  pantoprazole    Tablet 40 milliGRAM(s) Oral before breakfast  sodium chloride 0.9%. 1000 milliLiter(s) (40 mL/Hr) IV Continuous <Continuous>    MEDICATIONS  (PRN):  acetaminophen   Tablet 650 milliGRAM(s) Oral every 6 hours PRN For Temp greater than 38 C (100.4 F)  ALBUTerol/ipratropium for Nebulization 3 milliLiter(s) Nebulizer every 6 hours PRN Shortness of Breath and/or Wheezing      Vital Signs Last 24 Hrs  T(C): 36.1 (09 Feb 2018 08:58), Max: 36.7 (09 Feb 2018 00:00)  T(F): 97 (09 Feb 2018 08:58), Max: 98.1 (09 Feb 2018 00:00)  HR: 63 (09 Feb 2018 06:22) (63 - 84)  BP: 130/63 (09 Feb 2018 06:22) (95/55 - 145/92)  BP(mean): --  RR: 17 (09 Feb 2018 06:22) (16 - 17)  SpO2: 93% (09 Feb 2018 06:22) (93% - 99%)    GENERAL:         comfortable,  - distress.  HEENT:            - trauma,  - icterus,  - injection,  - nasal discharge.  NECK:              - jugular venous distention, - thyromegaly.  LYMPH:           - lymphadenopathy, - masses.  RESP:              + rhonchi, + crackles,   - wheezes.   COR:                S1S2   - gallops,  - rubs.  ABD:                bowel sounds,   soft, - tender, - distended, - organomegaly.  EXT/MSC:         - cyanosis,  - clubbing,  - edema.    NEURO:             alert,   responds to stimuli.                          10.2   4.2   )-----------( 229      ( 09 Feb 2018 07:29 )             30.2           CT Chest No Cont (02.07.18) Right middle lobe and right lower lobe consolidation consistent with multifocal pneumonia.  2. Small right pleural effusion.  3. 14 mm ground-glass opacity right upper lobe stable since 1/21/2016.   Recommend follow-up CT chest in 24 months to confirm stability.          ASSESSMENT/PLAN    1) Altered mental status  2) Pneumonia  3) Pulmonary nodule  4) Hypertension    Oxygen as needed  Bronchodilators:  Atrovent/ albuterol q 4 – 6 hours as needed  ID/Antibiotics: Levaquin/Zosyn  Cardiac/HTN: Losartan  GI: Rx/ prophylaxis c PPI/H2B  Heme: Rx/VT prophylaxis c SQH  Discussed with medical team

## 2018-02-09 NOTE — PROGRESS NOTE ADULT - SUBJECTIVE AND OBJECTIVE BOX
Neurology Follow up note    Name  SAVANAH ISAAC    HPI:  89 y/o active female (lives alone w/24h HHA) w/ PMHx of HTN, HLD, Dementia (A&Ox1), hx Cerebral Artery Occlusion, pAfib (no AC, pt. new onset ~1mo ago 2/2 to UTI/Pyelo in [place]; currently SR) presented to [facility] ED 1/16 after change in functional status / lethargy per family. Pt's son stated that upon arriving to her apt this AM to take her to breakfast, she was slumped over and lethargic. Normally she walks on her own, but she needed her son and daughter-in-law to help her down the stairs. The aid reported that she had a decreased PO intake over the past few days but no new symptoms or complaints aside from mild back pain since her last admission to [facility] for syncope work up. Denied fever, chills, recent illness, sick contacts, chest pain, SOB, HAYDEN, orthopnea, LE edema, n/v/d, abd pain, dysuria, hematuria. Pt herself denies any symptoms however A&Ox1. Of note, patient was recently hospitalized December 2017 in [place], [place] for UTI/Pyelo and sinusitis and treated with IV abx, and upon return to [place], had a syncopal episode 1/15 and was admitted to [facility] with a negative work up including CTA brain, Echo 1/16 revealing NL LV wall motion, LVEF NL 60-65%, No AS. Carotid US revealed mild-mod dz, TSH WNL, and UA repeated which was negative for UTI and urine culture negative. Pt was given a Holter Monitor otpt by Dr. Stahl which revealed no atrial fibrillation, but bradycardia to 40s. In the ED today VSS, afebrile, ECG NSR @ 61 BPM no acute STT changes, CT Head unchanged, labs sig for troponin negative x1, Na 126, K 3.8. Pt will be admitted to [facility] for further monitoring, EP consult, and work up for possible sick sinus syndrome. (06 Feb 2018 13:03)      Interval History -confusion and poor memory        REVIEW OF SYSTEMS    Vital Signs Last 24 Hrs  T(C): 36.1 (09 Feb 2018 08:58), Max: 36.7 (09 Feb 2018 00:00)  T(F): 97 (09 Feb 2018 08:58), Max: 98.1 (09 Feb 2018 00:00)  HR: 63 (09 Feb 2018 06:22) (63 - 84)  BP: 130/63 (09 Feb 2018 06:22) (95/55 - 145/92)  BP(mean): --  RR: 17 (09 Feb 2018 06:22) (16 - 17)  SpO2: 93% (09 Feb 2018 06:22) (93% - 99%)    Physical Exam-     Mental Status- awake and alert    Cranial Nerves- full EOM    Gait and station- no foot dop    Motor-moves all 4 extremities    Reflexes- decreased    Sensation-no sensory level    Coordination- no tremors    Vascular -no bruits    Medications  acetaminophen   Tablet 650 milliGRAM(s) Oral every 6 hours PRN  ALBUTerol/ipratropium for Nebulization 3 milliLiter(s) Nebulizer every 6 hours PRN  aspirin enteric coated 81 milliGRAM(s) Oral daily  atorvastatin 10 milliGRAM(s) Oral at bedtime  cholecalciferol 1000 Unit(s) Oral daily  galantamine ER 24 milliGRAM(s) Oral with breakfast  heparin  Injectable 5000 Unit(s) SubCutaneous every 8 hours  levoFLOXacin  Tablet 750 milliGRAM(s) Oral every 48 hours  losartan 100 milliGRAM(s) Oral daily  memantine 5 milliGRAM(s) Oral two times a day  pantoprazole    Tablet 40 milliGRAM(s) Oral before breakfast  piperacillin/tazobactam IVPB. 3.375 Gram(s) IV Intermittent every 8 hours  sodium chloride 0.9%. 1000 milliLiter(s) IV Continuous <Continuous>      Lab      Radiology    Assessment- No acute of acute neuro pathology    Plan- no neuro testing

## 2018-02-09 NOTE — CONSULT NOTE ADULT - ATTENDING COMMENTS
The patient has a long and complicated history  which I have reviewed.  She was found to be more lethargic. Found to be hyponatremic 126. Her sodium has since come up.  Yesterday, she had an episode of pronounced cough. Minimal hemoptysis. ?Cyanotic episode last night.  A CXR was done  Levoquin +Unisyn given initially. Changed to Levoquin and Zosyn.  Currently she is afebrile. Does not look toxic. Orodental hygiene appropriate.  Lungs: no crackles. No wheezing. RR 18/ min. Not tachycardic  Normal cardiac exam.  2-7-18: Rotated film; Right middle and lower lobe opacity. Calcified tracheal contour.  2-7-18: Opacity in the right middle and lower lobes suggestive of aspiration pneumonia. There is a 1.4 cm opacity in the RUL which is a ground glass nodule and is unchanged from 1/2016    Assessment:  The patient had altered mental status, most likely from the hyponatremia +/- cardiac causes. She probably aspirated in the right middle and lower lobes.   Her WBC count is 4.2 (5.7 on admission). Pro-calcitonin pending?   Agree with aspiration pneumonia diagnosis. Would give abbreviated antibiotic coverage. Would consider D/C Levoquin if not required.  Aspiration precautions The patient has a long and complicated history  which I have reviewed.  She was found to be more lethargic. Found to be hyponatremic 126. Her sodium has since come up.  Yesterday, she had an episode of pronounced cough. Minimal hemoptysis. ?Cyanotic episode last night.  A CXR was done  Levoquin +Unisyn given initially. Changed to Levoquin and Zosyn.  Currently she is afebrile. Does not look toxic. Orodental hygiene appropriate.  Lungs: b/l crackles. No wheezing. RR 18/ min. Not tachycardic  Normal cardiac exam.  2-7-18: Rotated film; Right middle and lower lobe opacity. Calcified tracheal contour.  2-7-18: Opacity in the right middle and lower lobes suggestive of aspiration pneumonia. There is a 1.4 cm opacity in the RUL which is a ground glass nodule and is unchanged from 1/2016    Assessment:  The patient had altered mental status, most likely from the hyponatremia +/- cardiac causes. She probably aspirated in the right middle and lower lobes.   Her WBC count is 4.2 (5.7 on admission). Pro-calcitonin pending?   Agree with aspiration pneumonia diagnosis. Would give abbreviated antibiotic coverage. Would consider D/C Levoquin if not required.  Aspiration precautions The patient has a long and complicated history  which I have reviewed.  She was found to be more lethargic. Found to be hyponatremic 126. Her sodium has since come up.  Yesterday, she had an episode of pronounced cough. Minimal hemoptysis. ?Cyanotic episode last night.  A CXR was done  Levoquin +Unisyn given initially. Changed to Levoquin and Zosyn.  Currently she is afebrile. Does not look toxic. Selvin-dental hygiene is suboptimal. Food particles discerned in mouth  Lungs: b/l crackles more pronounced at bases posteriorly No wheezing. RR 18/ min. Not tachycardic  Normal cardiac exam.  2-7-18: Rotated film; Right middle and lower lobe opacity. Calcified tracheal contour.  2-7-18: Opacity in the right middle and lower lobes suggestive of aspiration pneumonia. There is a 1.4 cm opacity in the RUL which is a ground glass nodule and is unchanged from 1/2016    Assessment:  The patient had altered mental status, most likely from the hyponatremia +/- cardiac causes. She probably aspirated in the right middle and lower lobes.   Her WBC count is 4.2 (5.7 on admission). Pro-calcitonin pending?   Agree with aspiration pneumonia diagnosis. Would give abbreviated antibiotic coverage. Would consider D/C Levoquin if not required.  Aspiration precautions  The RUL nodule will require serial CT scans as an out-patient

## 2018-02-09 NOTE — CONSULT NOTE ADULT - SUBJECTIVE AND OBJECTIVE BOX
Patient is a 89y old  Female who presents with a chief complaint of     HPI:  89 y/o active female (lives alone w/24h HHA) w/ PMHx of HTN, HLD, Dementia (A&Ox1), hx Cerebral Artery Occlusion, pAfib (no AC, pt. new onset ~1mo ago) BIBA to [facility] ED (2/6/2018)  after change in functional status / lethargy per family.Patient was initially admitted for the work up for possible sick sinus syndrome.   Pt's son stated that on the day of presentation when he went to the patient's  apt to take her to breakfast, she was slumped over and lethargic. Normally she walks on her own, but she needed her son and daughter-in-law to help her down the stairs. Patient was recently admitted to [facility] on 1/15 for work up of a syncopal episode.work up was negative including CTA brain, Echo 1/16 revealing NL LV wall motion, LVEF NL 60-65%, No AS. Carotid US revealed mild-mod dz, TSH WNL, and UA repeated which was negative for UTI and urine culture negative. Pt was given a Holter Monitor otpt by Dr. Stahl which revealed no atrial fibrillation, but bradycardia to 40s.The aid also reported that patient had a decreased PO intake over the past few days but no new symptoms or complaints aside from mild back pain since her last admission .  on admission patient was found to be hyponatremic which later resolved during her current stay.   Today the patient c/o intermittent episodes of productive cough bringing up yellowish white plegm. Denies fever, chills, recent illness, sick contacts(although was recently hospitalized ), chest pain, SOB, HAYDEN, orthopnea, LE edema, n/v/d, abd pain, dysuria, hematuria. Pt herself denies any symptoms however A&Ox2. Of note, patient was recently hospitalized December 2017 in [place], [place] for UTI/Pyelo and sinusitis and treated with IV abx, and upon return to [place], had a syncopal episode 1/15   Overnight as per family ; patient had an episode when her lips turned blue and was found to be hypoxic and was put on Oxygen NC with resolution of the symptoms.Also she had an episode of violent cough episode which led to mild hemoptysis for which the family is concerned. No history of recurrent events or similar events in the past    as per family at baseline patient has mild dementia but can do most of her activities of daily living with assistance .has a home health aid .Does not use walker or cane .    PAST MEDICAL & SURGICAL HISTORY:  AS (aortic stenosis)  Atrial fibrillation: Pt. had new onset of Afib two weeks ago while hospitalized for UTI and Sinusitis.  No AC.  Pt. currently SR  Sinusitis  UTI (urinary tract infection): Pt. was recently hospitalized in [place] two weeks ago tx with ABx  Essential hypertension: HTN (hypertension)  Cerebral artery occlusion with cerebral infarction: CVA (cerebral infarction)  Hyperlipidemia: HLD (hyperlipidemia)  Memory loss: Memory loss  Fall: Falls  No significant past surgical history      FAMILY HISTORY:      SOCIAL HISTORY:  Smoking Status: [ ] Current, [ ] Former, [x ] Never    MEDICATIONS:  acetaminophen   Tablet 650 milliGRAM(s) Oral every 6 hours PRN  ALBUTerol/ipratropium for Nebulization 3 milliLiter(s) Nebulizer every 6 hours PRN  aspirin enteric coated 81 milliGRAM(s) Oral daily  atorvastatin 10 milliGRAM(s) Oral at bedtime  cholecalciferol 1000 Unit(s) Oral daily  galantamine ER 24 milliGRAM(s) Oral with breakfast  heparin  Injectable 5000 Unit(s) SubCutaneous every 8 hours  levoFLOXacin  Tablet 750 milliGRAM(s) Oral every 48 hours  losartan 100 milliGRAM(s) Oral daily  memantine 5 milliGRAM(s) Oral two times a day  pantoprazole    Tablet 40 milliGRAM(s) Oral before breakfast  piperacillin/tazobactam IVPB. 3.375 Gram(s) IV Intermittent every 6 hours  sodium chloride 0.9%. 1000 milliLiter(s) IV Continuous <Continuous>      Allergies    doxycycline (Unknown)    Intolerances    hydrochlorothiazide (Other)      Vital Signs Last 24 Hrs  T(C): 36.2 (09 Feb 2018 14:09), Max: 36.7 (09 Feb 2018 00:00)  T(F): 97.1 (09 Feb 2018 14:09), Max: 98.1 (09 Feb 2018 00:00)  HR: 63 (09 Feb 2018 06:22) (63 - 84)  BP: 130/63 (09 Feb 2018 06:22) (95/55 - 145/92)  BP(mean): --  RR: 17 (09 Feb 2018 06:22) (16 - 17)  SpO2: 93% (09 Feb 2018 06:22) (93% - 99%)    02-08 @ 07:01  -  02-09 @ 07:00  --------------------------------------------------------  IN: 910 mL / OUT: 150 mL / NET: 760 mL    02-09 @ 07:01  -  02-09 @ 14:42  --------------------------------------------------------  IN: 60 mL / OUT: 60 mL / NET: 0 mL          LABS:      CBC Full  -  ( 09 Feb 2018 07:29 )  WBC Count : 4.2 K/uL  Hemoglobin : 10.2 g/dL  Hematocrit : 30.2 %  Platelet Count - Automated : 229 K/uL  Mean Cell Volume : 90.1 fL  Mean Cell Hemoglobin : 30.4 pg  Mean Cell Hemoglobin Concentration : 33.8 g/dL    02-09    137  |  99  |  15  ----------------------------<  99  4.3   |  28  |  0.93    Ca    9.0      09 Feb 2018 07:29  Mg     2.1     02-09    TPro  6.3  /  Alb  3.2<L>  /  TBili  0.3  /  DBili  x   /  AST  35  /  ALT  18  /  AlkPhos  60  02-08    PT/INR - ( 08 Feb 2018 06:24 )   PT: 13.4 sec;   INR: 1.20          PTT - ( 08 Feb 2018 06:24 )  PTT:28.8 sec        RADIOLOGY & ADDITIONAL STUDIES (The following images were personally reviewed):< from: CT Chest No Cont (02.07.18 @ 18:48) >  1. Right middle lobe and right lower lobe consolidation consistent with  multifocal pneumonia.  2. Small right pleural effusion.  3. 14 mm groundglass opacity right upper lobe stable since 1/21/2016.   Recommend follow-up CT chest in 24 months to confirm stability.    < from: Xray Chest 1 View- PORTABLE-Urgent (02.07.18 @ 11:02) >  New airspace consolidation right medial base. The rest of the lungs are clear. There are no pleural effusions.  The cardiomediastinal silhouette, bones and soft tissues are unremarkable.    < end of copied text >            < end of copied text > Patient is a 89y old  Female who presents with a chief complaint of lethargy and altered mental status    HPI:  89 y/o active female (lives alone w/24h HHA) w/ PMHx of HTN, HLD, Dementia (A&Ox1), hx Cerebral Artery Occlusion, pAfib (no AC, pt. new onset ~1mo ago) BIBA to [facility] ED (2/6/2018)  after change in functional status / lethargy per family.Patient was initially admitted for the work up for possible sick sinus syndrome.   Pt's son stated that on the day of presentation when he went to the patient's  apt to take her to breakfast, she was slumped over and lethargic. Normally she walks on her own, but she needed her son and daughter-in-law to help her down the stairs. Patient was recently admitted to [facility] on 1/15 for work up of a syncopal episode.work up was negative including CTA brain, Echo 1/16 revealing NL LV wall motion, LVEF NL 60-65%, No AS. Carotid US revealed mild-mod dz, TSH WNL, and UA repeated which was negative for UTI and urine culture negative. Pt was given a Holter Monitor otpt by Dr. Stahl which revealed no atrial fibrillation, but bradycardia to 40s.The aid also reported that patient had a decreased PO intake over the past few days but no new symptoms or complaints aside from mild back pain since her last admission .  on admission patient was found to be hyponatremic which later resolved during her current stay.   Today the patient c/o intermittent episodes of productive cough bringing up yellowish white plegm. Denies fever, chills, recent illness, sick contacts(although was recently hospitalized ), chest pain, SOB, HAYDEN, orthopnea, LE edema, n/v/d, abd pain, dysuria, hematuria. Pt herself denies any symptoms however A&Ox2. Of note, patient was recently hospitalized December 2017 in [place], [place] for UTI/Pyelo and sinusitis and treated with IV abx, and upon return to [place], had a syncopal episode 1/15   Overnight as per family ; patient had an episode when her lips turned blue and was found to be hypoxic and was put on Oxygen NC with resolution of the symptoms.Also she had an episode of violent cough episode which led to mild hemoptysis for which the family is concerned. No history of recurrent events or similar events in the past    as per family at baseline patient has mild dementia but can do most of her activities of daily living with assistance .has a home health aid .Does not use walker or cane .    PAST MEDICAL & SURGICAL HISTORY:  AS (aortic stenosis)  Atrial fibrillation: Pt. had new onset of Afib two weeks ago while hospitalized for UTI and Sinusitis.  No AC.  Pt. currently SR  Sinusitis  UTI (urinary tract infection): Pt. was recently hospitalized in [place] two weeks ago tx with ABx  Essential hypertension: HTN (hypertension)  Cerebral artery occlusion with cerebral infarction: CVA (cerebral infarction)  Hyperlipidemia: HLD (hyperlipidemia)  Memory loss: Memory loss  Fall: Falls  No significant past surgical history      FAMILY HISTORY:      SOCIAL HISTORY:  Smoking Status: [ ] Current, [ ] Former, [x ] Never    MEDICATIONS:  acetaminophen   Tablet 650 milliGRAM(s) Oral every 6 hours PRN  ALBUTerol/ipratropium for Nebulization 3 milliLiter(s) Nebulizer every 6 hours PRN  aspirin enteric coated 81 milliGRAM(s) Oral daily  atorvastatin 10 milliGRAM(s) Oral at bedtime  cholecalciferol 1000 Unit(s) Oral daily  galantamine ER 24 milliGRAM(s) Oral with breakfast  heparin  Injectable 5000 Unit(s) SubCutaneous every 8 hours  levoFLOXacin  Tablet 750 milliGRAM(s) Oral every 48 hours  losartan 100 milliGRAM(s) Oral daily  memantine 5 milliGRAM(s) Oral two times a day  pantoprazole    Tablet 40 milliGRAM(s) Oral before breakfast  piperacillin/tazobactam IVPB. 3.375 Gram(s) IV Intermittent every 6 hours  sodium chloride 0.9%. 1000 milliLiter(s) IV Continuous <Continuous>      Allergies    doxycycline (Unknown)    Intolerances    hydrochlorothiazide (Other)      Vital Signs Last 24 Hrs  T(C): 36.2 (09 Feb 2018 14:09), Max: 36.7 (09 Feb 2018 00:00)  T(F): 97.1 (09 Feb 2018 14:09), Max: 98.1 (09 Feb 2018 00:00)  HR: 63 (09 Feb 2018 06:22) (63 - 84)  BP: 130/63 (09 Feb 2018 06:22) (95/55 - 145/92)  BP(mean): --  RR: 17 (09 Feb 2018 06:22) (16 - 17)  SpO2: 93% (09 Feb 2018 06:22) (93% - 99%)    02-08 @ 07:01  -  02-09 @ 07:00  --------------------------------------------------------  IN: 910 mL / OUT: 150 mL / NET: 760 mL    02-09 @ 07:01  -  02-09 @ 14:42  --------------------------------------------------------  IN: 60 mL / OUT: 60 mL / NET: 0 mL          LABS:      CBC Full  -  ( 09 Feb 2018 07:29 )  WBC Count : 4.2 K/uL  Hemoglobin : 10.2 g/dL  Hematocrit : 30.2 %  Platelet Count - Automated : 229 K/uL  Mean Cell Volume : 90.1 fL  Mean Cell Hemoglobin : 30.4 pg  Mean Cell Hemoglobin Concentration : 33.8 g/dL    02-09    137  |  99  |  15  ----------------------------<  99  4.3   |  28  |  0.93    Ca    9.0      09 Feb 2018 07:29  Mg     2.1     02-09    TPro  6.3  /  Alb  3.2<L>  /  TBili  0.3  /  DBili  x   /  AST  35  /  ALT  18  /  AlkPhos  60  02-08    PT/INR - ( 08 Feb 2018 06:24 )   PT: 13.4 sec;   INR: 1.20          PTT - ( 08 Feb 2018 06:24 )  PTT:28.8 sec        RADIOLOGY & ADDITIONAL STUDIES (The following images were personally reviewed):< from: CT Chest No Cont (02.07.18 @ 18:48) >  1. Right middle lobe and right lower lobe consolidation consistent with  multifocal pneumonia.  2. Small right pleural effusion.  3. 14 mm groundglass opacity right upper lobe stable since 1/21/2016.   Recommend follow-up CT chest in 24 months to confirm stability.    < from: Xray Chest 1 View- PORTABLE-Urgent (02.07.18 @ 11:02) >  New airspace consolidation right medial base. The rest of the lungs are clear. There are no pleural effusions.  The cardiomediastinal silhouette, bones and soft tissues are unremarkable.    < end of copied text >            < end of copied text > Patient is a 89y old  Female who presents with a chief complaint of lethargy and altered mental status    HPI:  89 y/o active female (lives alone w/24h HHA) w/ PMHx of HTN, HLD, Dementia (A&Ox1), hx Cerebral Artery Occlusion, pAfib (no AC, pt. new onset ~1mo ago) BIBA to [facility] ED (2/6/2018)  after change in functional status / lethargy per family.Patient was initially admitted for the work up for possible sick sinus syndrome.   Pt's son stated that on the day of presentation when he went to the patient's  apt to take her to breakfast, she was slumped over and lethargic. Normally she walks on her own, but she needed her son and daughter-in-law to help her down the stairs. Patient was recently admitted to [facility] on 1/15 for work up of a syncopal episode.work up was negative including CTA brain, Echo 1/16 revealing NL LV wall motion, LVEF NL 60-65%, No AS. Carotid US revealed mild-mod dz, TSH WNL, and UA repeated which was negative for UTI and urine culture negative. Pt was given a Holter Monitor otpt by Dr. Stahl which revealed no atrial fibrillation, but bradycardia to 40s.The aid also reported that patient had a decreased PO intake over the past few days but no new symptoms or complaints aside from mild back pain since her last admission .  on admission patient was found to be hyponatremic which later resolved during her current stay.   Today the patient c/o intermittent episodes of productive cough bringing up yellowish white plegm. Denies fever, chills, recent illness, sick contacts(although was recently hospitalized ), chest pain, SOB, HAYDEN, orthopnea, LE edema, n/v/d, abd pain, dysuria, hematuria. Pt herself denies any symptoms however A&Ox2. Of note, patient was recently hospitalized December 2017 in [place], [place] for UTI/Pyelo and sinusitis and treated with IV abx, and upon return to [place], had a syncopal episode 1/15   Overnight as per family ; patient had an episode when her lips turned blue and was found to be hypoxic and was put on Oxygen NC with resolution of the symptoms.Also she had an episode of violent cough episode which led to mild hemoptysis for which the family is concerned. No history of recurrent events or similar events in the past    as per family at baseline patient has mild dementia but can do most of her activities of daily living with assistance .has a 24 hour home health aid .Does not use walker or cane .  12 point ROS is negative except as mentioned above     PAST MEDICAL & SURGICAL HISTORY:  AS (aortic stenosis)  Atrial fibrillation: Pt. had new onset of Afib two weeks ago while hospitalized for UTI and Sinusitis.  No AC.  Pt. currently SR  Sinusitis  UTI (urinary tract infection): Pt. was recently hospitalized in [place] two weeks ago tx with ABx  Essential hypertension: HTN (hypertension)  Cerebral artery occlusion with cerebral infarction: CVA (cerebral infarction)  Hyperlipidemia: HLD (hyperlipidemia)  Memory loss: Memory loss  Fall: Falls  No significant past surgical history    FAMILY HISTORY:    SOCIAL HISTORY:  Smoking Status: [ ] Current, [ ] Former, [x ] Never    MEDICATIONS:  acetaminophen   Tablet 650 milliGRAM(s) Oral every 6 hours PRN  ALBUTerol/ipratropium for Nebulization 3 milliLiter(s) Nebulizer every 6 hours PRN  aspirin enteric coated 81 milliGRAM(s) Oral daily  atorvastatin 10 milliGRAM(s) Oral at bedtime  cholecalciferol 1000 Unit(s) Oral daily  galantamine ER 24 milliGRAM(s) Oral with breakfast  heparin  Injectable 5000 Unit(s) SubCutaneous every 8 hours  levoFLOXacin  Tablet 750 milliGRAM(s) Oral every 48 hours  losartan 100 milliGRAM(s) Oral daily  memantine 5 milliGRAM(s) Oral two times a day  pantoprazole    Tablet 40 milliGRAM(s) Oral before breakfast  piperacillin/tazobactam IVPB. 3.375 Gram(s) IV Intermittent every 6 hours  sodium chloride 0.9%. 1000 milliLiter(s) IV Continuous <Continuous>    Allergies    doxycycline (Unknown)    Intolerances    hydrochlorothiazide (Other)    Vital Signs Last 24 Hrs  T(C): 36.2 (09 Feb 2018 14:09), Max: 36.7 (09 Feb 2018 00:00)  T(F): 97.1 (09 Feb 2018 14:09), Max: 98.1 (09 Feb 2018 00:00)  HR: 63 (09 Feb 2018 06:22) (63 - 84)  BP: 130/63 (09 Feb 2018 06:22) (95/55 - 145/92)  BP(mean): --  RR: 17 (09 Feb 2018 06:22) (16 - 17)  SpO2: 93% (09 Feb 2018 06:22) (93% - 99%)    02-08 @ 07:01 - 02-09 @ 07:00  --------------------------------------------------------  IN: 910 mL / OUT: 150 mL / NET: 760 mL    02-09 @ 07:01 - 02-09 @ 14:42  --------------------------------------------------------  IN: 60 mL / OUT: 60 mL / NET: 0 mL    General: NAD, AAO x2  HEENT: No icterus,. Moist mucous membranes ; oral cavity examination grossly normal although some food material appreciated  Neck: No JVD noted. Supple, no meningismus  Cardio: S1, S2 noted, RRR. systolic murmur + Right sternal border.no  rubs or gallops  Resp: b/l air entry present ; crackles + b/l lower lung bases  Abdo: Soft, NT, bowel sounds present. No organomegaly  Extremities:trace pretibial non pitting edema 1+; Pulses present b/l  Neuro: AAO x2, grossly normal motor strength.  Lymphnodes: no lymphadenopathy identified.  Skin: trace pretibial non pitting edema 1+, no rashes      LABS:      CBC Full  -  ( 09 Feb 2018 07:29 )  WBC Count : 4.2 K/uL  Hemoglobin : 10.2 g/dL  Hematocrit : 30.2 %  Platelet Count - Automated : 229 K/uL  Mean Cell Volume : 90.1 fL  Mean Cell Hemoglobin : 30.4 pg  Mean Cell Hemoglobin Concentration : 33.8 g/dL    02-09    137  |  99  |  15  ----------------------------<  99  4.3   |  28  |  0.93    Ca    9.0      09 Feb 2018 07:29  Mg     2.1     02-09    TPro  6.3  /  Alb  3.2<L>  /  TBili  0.3  /  DBili  x   /  AST  35  /  ALT  18  /  AlkPhos  60  02-08    PT/INR - ( 08 Feb 2018 06:24 )   PT: 13.4 sec;   INR: 1.20          PTT - ( 08 Feb 2018 06:24 )  PTT:28.8 sec        RADIOLOGY & ADDITIONAL STUDIES (The following images were personally reviewed):< from: CT Chest No Cont (02.07.18 @ 18:48) >  1. Right middle lobe and right lower lobe consolidation consistent with  multifocal pneumonia.  2. Small right pleural effusion.  3. 14 mm groundglass opacity right upper lobe stable since 1/21/2016.   Recommend follow-up CT chest in 24 months to confirm stability.    < from: Xray Chest 1 View- PORTABLE-Urgent (02.07.18 @ 11:02) >  New airspace consolidation right medial base. The rest of the lungs are clear. There are no pleural effusions.  The cardiomediastinal silhouette, bones and soft tissues are unremarkable.    < end of copied text >      < end of copied text >

## 2018-02-09 NOTE — PROGRESS NOTE ADULT - ASSESSMENT
89 yo active female with a PMHx HTN, HLD, dementia, paroxysmal atrial fibrillation, s/p CVA presented to [facility] ED (2/6/2018) for evaluation of altered mental status, lethargy and decreased po intake. Patient had episode of sinus bradycardia on outpatient Holter monitor with admission to [facility] for further work up. Patient has been deemed stable for transfer to Dr. Barahona's Medicine Service. 89 yo active female with a PMHx HTN, HLD, dementia, paroxysmal atrial fibrillation, s/p CVA presented to [facility] ED (2/6/2018) for evaluation of altered mental status and lethargy secondary to hyponatremia and aspiration PNA.

## 2018-02-09 NOTE — PROGRESS NOTE ADULT - SUBJECTIVE AND OBJECTIVE BOX
EPS Progress Note    S: OOB, in a chair, NAD     MEDICATIONS  (STANDING):  aspirin enteric coated 81 milliGRAM(s) Oral daily  atorvastatin 10 milliGRAM(s) Oral at bedtime  cholecalciferol 1000 Unit(s) Oral daily  galantamine ER 24 milliGRAM(s) Oral with breakfast  heparin  Injectable 5000 Unit(s) SubCutaneous every 8 hours  levoFLOXacin  Tablet 750 milliGRAM(s) Oral every 48 hours  losartan 100 milliGRAM(s) Oral daily  memantine 5 milliGRAM(s) Oral two times a day  pantoprazole    Tablet 40 milliGRAM(s) Oral before breakfast  piperacillin/tazobactam IVPB. 3.375 Gram(s) IV Intermittent every 8 hours  sodium chloride 0.9%. 1000 milliLiter(s) (40 mL/Hr) IV Continuous <Continuous>      Telemetry: sinus rhythm            General:  NAD         Chest:     Decreased Breath Sounds   Cardiac:  RRR      s1/s2        Abdomen:  +BS      Soft      Non-Tender      Non-Distended         Extremities: no edema       Labs:                                                               10.2   4.2   )-----------( 229      ( 09 Feb 2018 07:29 )             30.2     02-09    137  |  99  |  15  ----------------------------<  99  4.3   |  28  |  0.93    Ca    9.0      09 Feb 2018 07:29  Mg     2.1     02-09    TPro  6.3  /  Alb  3.2<L>  /  TBili  0.3  /  DBili  x   /  AST  35  /  ALT  18  /  AlkPhos  60  02-08    PT/INR - ( 08 Feb 2018 06:24 )   PT: 13.4 sec;   INR: 1.20          PTT - ( 08 Feb 2018 06:24 )  PTT:28.8 sec    Assessment/Plan:  89 yo F with history of HTN, HLD, dementia, paroxysmal atrial fibrillation, s/p CVA admitted  for evaluation of her altered mental status, lethargy and decreased po intake. Episode of sinus bradycardia on outpatient Holter monitor.   Telemetry monitoring revealed no eb or tachy  arrhythmias, patient maintained sinus rhythm, no episodes of  atrial fibrillation during monitoring. Will not proceed with any EP intervention at this time.

## 2018-02-09 NOTE — PROGRESS NOTE ADULT - PROBLEM SELECTOR PLAN 5
SBP stable, Continue Losartan 100mg QD.   - holding HCTZ 2/2 hyponatremia -SBP stable, Continue Losartan 100mg QD.   - holding HCTZ 2/2 hyponatremia.

## 2018-02-09 NOTE — CONSULT NOTE ADULT - ASSESSMENT
89 y/o active female (lives alone w/24h HHA) w/ PMHx of HTN, HLD, Dementia (A&Ox1), hx Cerebral Artery Occlusion, pAfib (no AC, pt. new onset ~1mo ago) BIBA to [facility] ED (2/6/2018)  after change in functional status / lethargy per family. Patient was initially admitted for the work up for possible sick sinus syndrome but as per EP no acute intervention advised .Lab studies revealed hyponatremia which has subsequently resolved with appropriate treatment.Family states patient is still not at baseline functional 89 y/o active female (lives alone w/24h HHA) w/ PMHx of HTN, HLD, Dementia (A&Ox1), hx Cerebral Artery Occlusion, pAfib (no AC, pt. new onset ~1mo ago) BIBA to [facility] ED (2/6/2018)  after change in functional status / lethargy per family. Patient was initially admitted for the work up for possible sick sinus syndrome but as per EP no acute intervention advised .Lab studies revealed hyponatremia which has subsequently resolved with appropriate treatment. Family states patient is still not at baseline functional and is lethargic but has improved since admission.Denies fever/sob/chest pain/palpitation although has intermittent occasionally productive cough bringing up yellowish white phlegm ;episode of mild hemoptysis yesterday post violent cough.Patient's saturation has imporved( ~95% on RA on examination this am) although had an episode of cyanosis ("lips turned blue")overnight which resolved with 2l  oxygen supplementation through NC.

## 2018-02-09 NOTE — PROGRESS NOTE ADULT - SUBJECTIVE AND OBJECTIVE BOX
Notes reviewed; discussed with staff; patient seen. Patient is awake and sitting up in a chair. She is more confused this evening and tries to get up unassisted. Can not have a coherent conversation. Work up in progress. Will begin Enhanced Observation with a sitter for safety. Continuing Supportive therapy.

## 2018-02-09 NOTE — CONSULT NOTE ADULT - PROBLEM SELECTOR RECOMMENDATION 2
likely etiology Aspiration PNA(considering patient's AMS,lethargy and CT findings) vs less likely HAP  Recommendation  - agree with IV abx but can consider discontinuing levaquin and to continue with Zosyn.  -if cough remains productive ;consider sputum culture  -appreciate speech and swallow recommendations  -aspiration precautions advised with recommendation HOB elevation >30 degrees  -Oral hygiene  -RVP-ve  -Thank you for the consult ;will continue to follow

## 2018-02-09 NOTE — PROGRESS NOTE ADULT - PROBLEM SELECTOR PLAN 4
Dr. Cates (Dr. Fox covering)  - Na 126 on admit w/ h/o decreased PO intake over the past few days and taking HCTZ at home.  - NA remains stable 131 today.    - Continue Gentle hydration NS @ 40cc/hr today  - Continue holding HCTZ. -Dr. Cates   - Na 126 on admission likely in the setting of decreased PO intake over the past few days and taking HCTZ at home.  -NA improved: 137 mmol/L today; s/p gentle IV hydration (2/8/2018)  -Will continue to hold HCTZ therapy as discussed with Dr. Cates and encourage PO intake.

## 2018-02-09 NOTE — PROGRESS NOTE ADULT - SUBJECTIVE AND OBJECTIVE BOX
Interventional Cardiology PA ADULT TRANSFER NOTE    Hospital Course: 89 y/o active female (lives alone w/24hour home health aide) with a PMHx of HTN, hyperlipidemia, Dementia, hx Cerebral Artery Occlusion, AS, pAfib (no AC, pt. new onset two weeks ago 2/2 to UTI/Pyelo in [place]; currently SR) and hx of recent hospitalization in [place], [place] for UTI/Pyelo and Sinusitis X 4days (tx with Abx) two weeks ago while on vacation with family, BIBA to [facility] ED (2/6/2018) as patient’s son stated that upon arriving to her apt this AM to take her to breakfast, she was slumped over and lethargic. Normally she walks on her own, but she needed her son and daughter-in-law to help her down the stairs. The aid reported that she had a decreased PO intake over the past few days but no new symptoms or complaints aside from mild back pain since her last admission to [facility] for syncope work up. Pt herself denies any symptoms however A&Ox1. Of note, patient was recently hospitalized December 2017 in [place], [place] for UTI/Pyelo and sinusitis and treated with IV abx, and upon return to [place], had a syncopal episode 1/15 and was admitted to [facility] with a negative work up including CTA brain, Echo 1/16 revealing NL LV wall motion, LVEF NL 60-65%, No AS. Carotid US revealed mild-mod dz, TSH WNL, and UA repeated which was negative for UTI and urine culture negative. Pt was given a Holter Monitor outpatient by Dr. Stahl which revealed no atrial fibrillation, but bradycardia to 40s. In the ED 2/6/2018, VSS, afebrile, ECG NSR @ 61 BPM no acute STT changes, CT Head unchanged, labs sig for troponin negative x1, Na 126, K 3.8.  Pt was admitted to [facility] for further work up of altered mental status, lethargy and decreased PO intake.    EP team was consulted and has reviewed telemetry throughout admission and patient with no bradycardia or tachy arrhythmias. Prior Echo 1/16/18 with NL EF 60-65%, NL wall motion, No HD sig valvular disease. Holter monitor @ home with bradycardia to 40s, no runs of afib noted. Patient maintained sinus rhythm throughout admission with no episodes of  atrial fibrillation during monitoring. No EP intervention indicated at this time as discussed with EP team.     In terms of altered mental status/acute lethargic state, this is likely attributed to hyponatremia and aspiration PNA. CT head negative on admission. Dr. Mcrae was consulted with no further neuro recommendations at this time. Dr. Shaikh was consulted with recommendation to continue Dementia medications (Namenda 5mg BID, Galantamine). Patient is currently on enhanced supervision.  Speech and swallow consulted and cleared patient for regular diet.     Dr. Bernabe and Dr. Gan consulted for aspiration PNA. CXR on admission positive for right hilar infiltrate concerning for aspiration pneumonia. CT Chest 2/7: Right middle lobe and right lower lobe consolidation consistent with multifocal pneumonia, Small right pleural effusion.  14 mm groundglass opacity right upper lobe stable since 1/21/2016 (f/u CT 1yr).  Patient was initially started on Levaquin 750 mg orally every 48 hours and Unasyn 3 mg IV BID for antibiotic coverage. Patient has been seen and examined by Dr. Gan this morning and lung exam has improved. Patient recommended to switch from Unasyn to Zosyn for broader coverage per Dr. Gan. Patient to continue aspiration precautions. Patient also with recent sinus infection and had X ray (2/8/2018) revealed No radiographic evidence of sinus inflammatory disease.    Patient also diagnosed with hyponatremia with NA level of 126 on admission likely secondary to decreased PO intake prior to admission and taking HCTZ at home. Dr. Cates and Dr. Conklin consulted and patient received gentle IV hydration 2/8/2018 with improvement and stabilization of NA to 137 today. Will continue to hold HCTZ for now and PO intake encouraged as discussed with Dr. Cates today.  Blood pressure has remained stable; will continue to hold HCTZ 2/2 hyponatremia and continue Losartan 100 mg daily.  Physical therapy has evaluated patient and recommended home with home PT and currently has 24 hour care at home.     At this time, patient is to be transferred to Dr. Barahona service for further IV ABx therapy. Dr. Barahona aware of patient and plan of care.     	  MEDICATIONS:  losartan 100 milliGRAM(s) Oral daily    levoFLOXacin  Tablet 750 milliGRAM(s) Oral every 48 hours  piperacillin/tazobactam IVPB. 3.375 Gram(s) IV Intermittent every 8 hours    ALBUTerol/ipratropium for Nebulization 3 milliLiter(s) Nebulizer every 6 hours PRN    acetaminophen   Tablet 650 milliGRAM(s) Oral every 6 hours PRN  galantamine ER 24 milliGRAM(s) Oral with breakfast  memantine 5 milliGRAM(s) Oral two times a day    pantoprazole    Tablet 40 milliGRAM(s) Oral before breakfast    atorvastatin 10 milliGRAM(s) Oral at bedtime    aspirin enteric coated 81 milliGRAM(s) Oral daily  cholecalciferol 1000 Unit(s) Oral daily  heparin  Injectable 5000 Unit(s) SubCutaneous every 8 hours  sodium chloride 0.9%. 1000 milliLiter(s) IV Continuous <Continuous>      	    [PHYSICAL EXAM:  TELEMETRY: No events  T(C): 36.1 (02-09-18 @ 08:58), Max: 36.7 (02-09-18 @ 00:00)  HR: 63 (02-09-18 @ 06:22) (63 - 84)  BP: 130/63 (02-09-18 @ 06:22) (95/55 - 145/92)  RR: 17 (02-09-18 @ 06:22) (16 - 17)  SpO2: 93% (02-09-18 @ 06:22) (93% - 99%)  Wt(kg): --  I&O's Summary    08 Feb 2018 07:01  -  09 Feb 2018 07:00  --------------------------------------------------------  IN: 910 mL / OUT: 150 mL / NET: 760 mL    09 Feb 2018 07:01  -  09 Feb 2018 12:42  --------------------------------------------------------  IN: 0 mL / OUT: 0 mL / NET: 0 mL        [place]:  Central/PICC/Mid Line:                                         Appearance: Normal	  HEENT:   Normal oral mucosa, PERRL, EOMI	  Neck: Supple, + JVD/ - JVD; Carotid Bruit   Cardiovascular: Normal S1 S2, No JVD, No murmurs,   Respiratory: Lungs clear to auscultation/Decreased Breath Sounds/No Rales, Rhonchi, Wheezing	  Gastrointestinal:  Soft, Non-tender, + BS	  Skin: No rashes, No ecchymoses, No cyanosis  Extremities: Normal range of motion, No clubbing, cyanosis or edema  Vascular: Peripheral pulses palpable 2+ bilaterally  Neurologic: Non-focal  Psychiatry: A & O x 3, Mood & affect appropriate      	    ECG:  	  RADIOLOGY:   DIAGNOSTIC TESTING:  [ ] Echocardiogram:  [ ]  Catheterization:  [ ] Stress Test:    [ ] LOPEZ  OTHER: 	    LABS:	 	  CARDIAC MARKERS:                                  10.2   4.2   )-----------( 229      ( 09 Feb 2018 07:29 )             30.2     02-09    137  |  99  |  15  ----------------------------<  99  4.3   |  28  |  0.93    Ca    9.0      09 Feb 2018 07:29  Mg     2.1     02-09    TPro  6.3  /  Alb  3.2<L>  /  TBili  0.3  /  DBili  x   /  AST  35  /  ALT  18  /  AlkPhos  60  02-08    proBNP:   Lipid Profile:   HgA1c:   TSH:   PT/INR - ( 08 Feb 2018 06:24 )   PT: 13.4 sec;   INR: 1.20          PTT - ( 08 Feb 2018 06:24 )  PTT:28.8 sec    ASSESSMENT/PLAN: 	        DVT ppx:  Dispo: Interventional Cardiology PA ADULT TRANSFER NOTE    CC: Lethargy, hyponatremia, PNA  S: Pt seen and examined at bedside this morning. Reports couch persists, denies chest pain, palpitations, dizziness, fever, chills.   12 point review of system otherwise negative, except for subjective.   Hospital Course: 89 y/o active female (lives alone w/24hour home health aide) with a PMHx of HTN, hyperlipidemia, Dementia, hx Cerebral Artery Occlusion, AS, pAfib (no AC, pt. new onset two weeks ago 2/2 to UTI/Pyelo in [place]; currently SR) and hx of recent hospitalization in [place], [place] for UTI/Pyelo and Sinusitis X 4days (tx with Abx) two weeks ago while on vacation with family, BIBA to [facility] ED (2/6/2018) as patient’s son stated that upon arriving to her apt this AM to take her to breakfast, she was slumped over and lethargic. Normally she walks on her own, but she needed her son and daughter-in-law to help her down the stairs. The aid reported that she had a decreased PO intake over the past few days but no new symptoms or complaints aside from mild back pain since her last admission to [facility] for syncope work up. Pt herself denies any symptoms however A&Ox1. Of note, patient was recently hospitalized December 2017 in [place], [place] for UTI/Pyelo and sinusitis and treated with IV abx, and upon return to [place], had a syncopal episode 1/15 and was admitted to [facility] with a negative work up including CTA brain, Echo 1/16 revealing NL LV wall motion, LVEF NL 60-65%, No AS. Carotid US revealed mild-mod dz, TSH WNL, and UA repeated which was negative for UTI and urine culture negative. Pt was given a Holter Monitor outpatient by Dr. Stahl which revealed no atrial fibrillation, but bradycardia to 40s. In the ED 2/6/2018, VSS, afebrile, ECG NSR @ 61 BPM no acute STT changes, CT Head unchanged, labs sig for troponin negative x1, Na 126, K 3.8.  Pt was admitted to [facility] for further work up of altered mental status, lethargy and decreased PO intake.    EP team was consulted and has reviewed telemetry throughout admission and patient with no bradycardia or tachy arrhythmias. Prior Echo 1/16/18 with NL EF 60-65%, NL wall motion, No HD sig valvular disease. Holter monitor @ home with bradycardia to 40s, no runs of afib noted. Patient maintained sinus rhythm throughout admission with no episodes of  atrial fibrillation during monitoring. No EP intervention indicated at this time as discussed with EP team.     In terms of altered mental status/acute lethargic state, this is likely attributed to hyponatremia and aspiration PNA. CT head negative on admission. Dr. Mcrae was consulted with no further neuro recommendations at this time. Dr. Shaikh was consulted with recommendation to continue Dementia medications (Namenda 5mg BID, Galantamine). Patient is currently on enhanced supervision.  Speech and swallow consulted and cleared patient for regular diet.     Dr. Bernabe and Dr. Gan consulted for aspiration PNA. CXR on admission positive for right hilar infiltrate concerning for aspiration pneumonia. CT Chest 2/7: Right middle lobe and right lower lobe consolidation consistent with multifocal pneumonia, Small right pleural effusion.  14 mm groundglass opacity right upper lobe stable since 1/21/2016 (f/u CT 1yr).  Patient was initially started on Levaquin 750 mg orally every 48 hours and Unasyn 3 mg IV BID for antibiotic coverage. Patient has been seen and examined by Dr. Gan this morning and lung exam has improved. Patient recommended to switch from Unasyn to Zosyn for broader coverage per Dr. Gan. Patient to continue aspiration precautions. Patient also with recent sinus infection and had X ray (2/8/2018) revealed No radiographic evidence of sinus inflammatory disease.    Patient also diagnosed with hyponatremia with NA level of 126 on admission likely secondary to decreased PO intake prior to admission and taking HCTZ at home. Dr. Cates and Dr. Conklin consulted and patient received gentle IV hydration 2/8/2018 with improvement and stabilization of NA to 137 today. Will continue to hold HCTZ for now and PO intake encouraged as discussed with Dr. Cates today.  Blood pressure has remained stable; will continue to hold HCTZ 2/2 hyponatremia and continue Losartan 100 mg daily.  Physical therapy has evaluated patient and recommended home with home PT and currently has 24 hour care at home.     At this time, patient is to be transferred to Dr. Barahona service for further IV ABx therapy. Dr. Orsher aware of patient and plan of care.     	  MEDICATIONS:  losartan 100 milliGRAM(s) Oral daily    levoFLOXacin  Tablet 750 milliGRAM(s) Oral every 48 hours  piperacillin/tazobactam IVPB. 3.375 Gram(s) IV Intermittent every 8 hours    ALBUTerol/ipratropium for Nebulization 3 milliLiter(s) Nebulizer every 6 hours PRN    acetaminophen   Tablet 650 milliGRAM(s) Oral every 6 hours PRN  galantamine ER 24 milliGRAM(s) Oral with breakfast  memantine 5 milliGRAM(s) Oral two times a day    pantoprazole    Tablet 40 milliGRAM(s) Oral before breakfast    atorvastatin 10 milliGRAM(s) Oral at bedtime    aspirin enteric coated 81 milliGRAM(s) Oral daily  cholecalciferol 1000 Unit(s) Oral daily  heparin  Injectable 5000 Unit(s) SubCutaneous every 8 hours  sodium chloride 0.9%. 1000 milliLiter(s) IV Continuous <Continuous>      	    [PHYSICAL EXAM:  TELEMETRY: No events  T(C): 36.1 (02-09-18 @ 08:58), Max: 36.7 (02-09-18 @ 00:00)  HR: 63 (02-09-18 @ 06:22) (63 - 84)  BP: 130/63 (02-09-18 @ 06:22) (95/55 - 145/92)  RR: 17 (02-09-18 @ 06:22) (16 - 17)  SpO2: 93% (02-09-18 @ 06:22) (93% - 99%)  Wt(kg): --  I&O's Summary    08 Feb 2018 07:01  -  09 Feb 2018 07:00  --------------------------------------------------------  IN: 910 mL / OUT: 150 mL / NET: 760 mL    09 Feb 2018 07:01  -  09 Feb 2018 12:42  --------------------------------------------------------  IN: 0 mL / OUT: 0 mL / NET: 0 mL    Gen: NAD, resting comfortable in bed  Neck: No JVD B/l  Cardiac: +S1, S2, RRR  Pulm: CTA b/l  Abdomen: BS present, soft, NT, ND  Extremities: trace pedal edema b/l  Neuro: A+O x 1 (person only). Follows commands, no deficits appreciated.       	    ECG:  	  RADIOLOGY:   DIAGNOSTIC TESTING:  [ ] Echocardiogram:  [ ]  Catheterization:  [ ] Stress Test:    [ ] LOPEZ  OTHER: 	    LABS:	 	  CARDIAC MARKERS:                                  10.2   4.2   )-----------( 229      ( 09 Feb 2018 07:29 )             30.2     02-09    137  |  99  |  15  ----------------------------<  99  4.3   |  28  |  0.93    Ca    9.0      09 Feb 2018 07:29  Mg     2.1     02-09    TPro  6.3  /  Alb  3.2<L>  /  TBili  0.3  /  DBili  x   /  AST  35  /  ALT  18  /  AlkPhos  60  02-08    proBNP:   Lipid Profile:   HgA1c:   TSH:   PT/INR - ( 08 Feb 2018 06:24 )   PT: 13.4 sec;   INR: 1.20          PTT - ( 08 Feb 2018 06:24 )  PTT:28.8 sec    ASSESSMENT/PLAN: 	        DVT ppx:  Dispo: Interventional Cardiology PA ADULT TRANSFER NOTE    CC: Lethargy, hyponatremia, PNA  S: Pt seen and examined at bedside this morning. Reports couch persists, denies chest pain, palpitations, dizziness, fever, chills.   12 point review of system otherwise negative, except for subjective.   Hospital Course: 89 y/o active female (lives alone w/24hour home health aide) with a PMHx of HTN, hyperlipidemia, Dementia, hx Cerebral Artery Occlusion, AS, pAfib (no AC, pt. new onset two weeks ago 2/2 to UTI/Pyelo in [place]; currently SR) and hx of recent hospitalization in [place], [place] for UTI/Pyelo and Sinusitis X 4days (tx with Abx) two weeks ago while on vacation with family, BIBA to [facility] ED (2/6/2018) as patient’s son stated that upon arriving to her apt this AM to take her to breakfast, she was slumped over and lethargic. Normally she walks on her own, but she needed her son and daughter-in-law to help her down the stairs. The aid reported that she had a decreased PO intake over the past few days but no new symptoms or complaints aside from mild back pain since her last admission to [facility] for syncope work up. Pt herself denies any symptoms however A&Ox1. Of note, patient was recently hospitalized December 2017 in [place], [place] for UTI/Pyelo and sinusitis and treated with IV abx, and upon return to [place], had a syncopal episode 1/15 and was admitted to [facility] with a negative work up including CTA brain, Echo 1/16 revealing NL LV wall motion, LVEF NL 60-65%, No AS. Carotid US revealed mild-mod dz, TSH WNL, and UA repeated which was negative for UTI and urine culture negative. Pt was given a Holter Monitor outpatient by Dr. Stahl which revealed no atrial fibrillation, but bradycardia to 40s. In the ED 2/6/2018, VSS, afebrile, ECG NSR @ 61 BPM no acute STT changes, CT Head unchanged, labs sig for troponin negative x1, Na 126, K 3.8.  Pt was admitted to [facility] for further work up of altered mental status, lethargy and decreased PO intake.    EP team was consulted and has reviewed telemetry throughout admission and patient with no bradycardia or tachy arrhythmias. Prior Echo 1/16/18 with NL EF 60-65%, NL wall motion, No HD sig valvular disease. Holter monitor @ home with bradycardia to 40s, no runs of afib noted. Patient maintained sinus rhythm throughout admission with no episodes of  atrial fibrillation during monitoring. No EP intervention indicated at this time as discussed with EP team.     In terms of altered mental status/acute lethargic state, this is likely attributed to hyponatremia and aspiration PNA. CT head negative on admission. Dr. Mcrae was consulted with no further neuro recommendations at this time. Dr. Shaikh was consulted with recommendation to continue Dementia medications (Namenda 5mg BID, Galantamine). Patient is currently on enhanced supervision.  Speech and swallow consulted and cleared patient for regular diet.     Dr. Bernabe and Dr. Gan consulted for aspiration PNA. CXR on admission positive for right hilar infiltrate concerning for aspiration pneumonia. CT Chest 2/7: Right middle lobe and right lower lobe consolidation consistent with multifocal pneumonia, Small right pleural effusion.  14 mm groundglass opacity right upper lobe stable since 1/21/2016 (f/u CT 1yr).  Patient was initially started on Levaquin 750 mg orally every 48 hours and Unasyn 3 mg IV BID for antibiotic coverage. Patient has been seen and examined by Dr. Gan this morning and lung exam has improved. Patient recommended to switch from Unasyn to Zosyn for broader coverage per Dr. Gan. Patient to continue aspiration precautions. Patient also with recent sinus infection and had X ray (2/8/2018) revealed No radiographic evidence of sinus inflammatory disease. Later this afternoon; family requested evaluation from Dr. Moya as patient was scheduled to follow up with Dr. Moya as an outpatient. Dr. Moya recommends discontinuation of Levaquin and to continue Zosyn only.     Patient also diagnosed with hyponatremia with NA level of 126 on admission likely secondary to decreased PO intake prior to admission and taking HCTZ at home. Dr. Cates and Dr. Conklin consulted and patient received gentle IV hydration 2/8/2018 with improvement and stabilization of NA to 137 today. Will continue to hold HCTZ for now and PO intake encouraged as discussed with Dr. Cates today.  Blood pressure has remained stable; will continue to hold HCTZ 2/2 hyponatremia and continue Losartan 100 mg daily.  Physical therapy has evaluated patient and recommended home with home PT and currently has 24 hour care at home.     At this time, patient is to be transferred to Dr. Barahona service for further IV ABx therapy. Dr. Barahona aware of patient and plan of care.     	  MEDICATIONS:  losartan 100 milliGRAM(s) Oral daily    levoFLOXacin  Tablet 750 milliGRAM(s) Oral every 48 hours  piperacillin/tazobactam IVPB. 3.375 Gram(s) IV Intermittent every 8 hours    ALBUTerol/ipratropium for Nebulization 3 milliLiter(s) Nebulizer every 6 hours PRN    acetaminophen   Tablet 650 milliGRAM(s) Oral every 6 hours PRN  galantamine ER 24 milliGRAM(s) Oral with breakfast  memantine 5 milliGRAM(s) Oral two times a day    pantoprazole    Tablet 40 milliGRAM(s) Oral before breakfast    atorvastatin 10 milliGRAM(s) Oral at bedtime    aspirin enteric coated 81 milliGRAM(s) Oral daily  cholecalciferol 1000 Unit(s) Oral daily  heparin  Injectable 5000 Unit(s) SubCutaneous every 8 hours  sodium chloride 0.9%. 1000 milliLiter(s) IV Continuous <Continuous>      	    [PHYSICAL EXAM:  TELEMETRY: No events  T(C): 36.1 (02-09-18 @ 08:58), Max: 36.7 (02-09-18 @ 00:00)  HR: 63 (02-09-18 @ 06:22) (63 - 84)  BP: 130/63 (02-09-18 @ 06:22) (95/55 - 145/92)  RR: 17 (02-09-18 @ 06:22) (16 - 17)  SpO2: 93% (02-09-18 @ 06:22) (93% - 99%)  Wt(kg): --  I&O's Summary    08 Feb 2018 07:01  -  09 Feb 2018 07:00  --------------------------------------------------------  IN: 910 mL / OUT: 150 mL / NET: 760 mL    09 Feb 2018 07:01  -  09 Feb 2018 12:42  --------------------------------------------------------  IN: 0 mL / OUT: 0 mL / NET: 0 mL    Gen: NAD, resting comfortable in bed  Neck: No JVD B/l  Cardiac: +S1, S2, RRR  Pulm: CTA b/l  Abdomen: BS present, soft, NT, ND  Extremities: trace pedal edema b/l  Neuro: A+O x 1 (person only). Follows commands, no deficits appreciated.       	    ECG:  	  RADIOLOGY:   DIAGNOSTIC TESTING:  [ ] Echocardiogram:  [ ]  Catheterization:  [ ] Stress Test:    [ ] LOPEZ  OTHER: 	    LABS:	 	  CARDIAC MARKERS:                                  10.2   4.2   )-----------( 229      ( 09 Feb 2018 07:29 )             30.2     02-09    137  |  99  |  15  ----------------------------<  99  4.3   |  28  |  0.93    Ca    9.0      09 Feb 2018 07:29  Mg     2.1     02-09    TPro  6.3  /  Alb  3.2<L>  /  TBili  0.3  /  DBili  x   /  AST  35  /  ALT  18  /  AlkPhos  60  02-08    proBNP:   Lipid Profile:   HgA1c:   TSH:   PT/INR - ( 08 Feb 2018 06:24 )   PT: 13.4 sec;   INR: 1.20          PTT - ( 08 Feb 2018 06:24 )  PTT:28.8 sec    ASSESSMENT/PLAN: 	        DVT ppx:  Dispo:

## 2018-02-09 NOTE — CONSULT NOTE ADULT - PROBLEM SELECTOR RECOMMENDATION 9
likely etiology hyponatremia vs less likely sepsis 2/2 PNA (score 2 on qSOFA) vs hypoxia   Recommendation  -hyponatremia has resolved with appropriate management   -agree with holding HCTZ and monitoring BMP periodically;encourage PO intake  -monitor Is and Os and keep I<o   -measure weight everyday  -for Rx presumed Aspiration PNA agree with continuing with IV abx and later on switching to PO abx post discharge .  -encourage ambulation;OOB to Chair  -thank you for the consult; will continue to follow likely etiology hyponatremia vs less likely sepsis 2/2 PNA (score 2 on qSOFA) vs hypoxia   Recommendation  -hyponatremia has resolved with appropriate management   -agree with holding HCTZ and monitoring BMP periodically;encourage PO intake  -monitor Is and Os and keep I<o   -measure weight everyday  -keep oxygen sat 88-92%  -for Rx presumed Aspiration PNA agree with continuing with IV abx and later on switching to PO abx post discharge .  -encourage ambulation;OOB to Chair  -thank you for the consult; will continue to follow

## 2018-02-10 DIAGNOSIS — I48.91 UNSPECIFIED ATRIAL FIBRILLATION: ICD-10-CM

## 2018-02-10 DIAGNOSIS — J18.9 PNEUMONIA, UNSPECIFIED ORGANISM: ICD-10-CM

## 2018-02-10 DIAGNOSIS — J32.9 CHRONIC SINUSITIS, UNSPECIFIED: ICD-10-CM

## 2018-02-10 DIAGNOSIS — F03.90 UNSPECIFIED DEMENTIA WITHOUT BEHAVIORAL DISTURBANCE: ICD-10-CM

## 2018-02-10 LAB
ANION GAP SERPL CALC-SCNC: 13 MMOL/L — SIGNIFICANT CHANGE UP (ref 5–17)
BUN SERPL-MCNC: 18 MG/DL — SIGNIFICANT CHANGE UP (ref 7–23)
CALCIUM SERPL-MCNC: 9 MG/DL — SIGNIFICANT CHANGE UP (ref 8.4–10.5)
CHLORIDE SERPL-SCNC: 100 MMOL/L — SIGNIFICANT CHANGE UP (ref 96–108)
CO2 SERPL-SCNC: 23 MMOL/L — SIGNIFICANT CHANGE UP (ref 22–31)
CREAT SERPL-MCNC: 1.15 MG/DL — SIGNIFICANT CHANGE UP (ref 0.5–1.3)
GLUCOSE SERPL-MCNC: 97 MG/DL — SIGNIFICANT CHANGE UP (ref 70–99)
HCT VFR BLD CALC: 30.2 % — LOW (ref 34.5–45)
HGB BLD-MCNC: 10.2 G/DL — LOW (ref 11.5–15.5)
MAGNESIUM SERPL-MCNC: 2.2 MG/DL — SIGNIFICANT CHANGE UP (ref 1.6–2.6)
MCHC RBC-ENTMCNC: 30.8 PG — SIGNIFICANT CHANGE UP (ref 27–34)
MCHC RBC-ENTMCNC: 33.8 G/DL — SIGNIFICANT CHANGE UP (ref 32–36)
MCV RBC AUTO: 91.2 FL — SIGNIFICANT CHANGE UP (ref 80–100)
PLATELET # BLD AUTO: 273 K/UL — SIGNIFICANT CHANGE UP (ref 150–400)
POTASSIUM SERPL-MCNC: 4.4 MMOL/L — SIGNIFICANT CHANGE UP (ref 3.5–5.3)
POTASSIUM SERPL-SCNC: 4.4 MMOL/L — SIGNIFICANT CHANGE UP (ref 3.5–5.3)
RBC # BLD: 3.31 M/UL — LOW (ref 3.8–5.2)
RBC # FLD: 13.9 % — SIGNIFICANT CHANGE UP (ref 10.3–16.9)
SODIUM SERPL-SCNC: 136 MMOL/L — SIGNIFICANT CHANGE UP (ref 135–145)
WBC # BLD: 4.8 K/UL — SIGNIFICANT CHANGE UP (ref 3.8–10.5)
WBC # FLD AUTO: 4.8 K/UL — SIGNIFICANT CHANGE UP (ref 3.8–10.5)

## 2018-02-10 PROCEDURE — 99231 SBSQ HOSP IP/OBS SF/LOW 25: CPT

## 2018-02-10 RX ADMIN — PIPERACILLIN AND TAZOBACTAM 200 GRAM(S): 4; .5 INJECTION, POWDER, LYOPHILIZED, FOR SOLUTION INTRAVENOUS at 06:20

## 2018-02-10 RX ADMIN — PIPERACILLIN AND TAZOBACTAM 200 GRAM(S): 4; .5 INJECTION, POWDER, LYOPHILIZED, FOR SOLUTION INTRAVENOUS at 00:14

## 2018-02-10 RX ADMIN — MEMANTINE HYDROCHLORIDE 5 MILLIGRAM(S): 10 TABLET ORAL at 09:50

## 2018-02-10 RX ADMIN — PIPERACILLIN AND TAZOBACTAM 200 GRAM(S): 4; .5 INJECTION, POWDER, LYOPHILIZED, FOR SOLUTION INTRAVENOUS at 18:35

## 2018-02-10 RX ADMIN — Medication 1000 UNIT(S): at 12:10

## 2018-02-10 RX ADMIN — PIPERACILLIN AND TAZOBACTAM 200 GRAM(S): 4; .5 INJECTION, POWDER, LYOPHILIZED, FOR SOLUTION INTRAVENOUS at 12:10

## 2018-02-10 RX ADMIN — Medication 81 MILLIGRAM(S): at 12:10

## 2018-02-10 RX ADMIN — GALANTAMINE HYDROBROMIDE 24 MILLIGRAM(S): 4 TABLET, FILM COATED ORAL at 09:50

## 2018-02-10 RX ADMIN — LOSARTAN POTASSIUM 100 MILLIGRAM(S): 100 TABLET, FILM COATED ORAL at 09:51

## 2018-02-10 RX ADMIN — ATORVASTATIN CALCIUM 10 MILLIGRAM(S): 80 TABLET, FILM COATED ORAL at 22:43

## 2018-02-10 RX ADMIN — PANTOPRAZOLE SODIUM 40 MILLIGRAM(S): 20 TABLET, DELAYED RELEASE ORAL at 06:20

## 2018-02-10 RX ADMIN — HEPARIN SODIUM 5000 UNIT(S): 5000 INJECTION INTRAVENOUS; SUBCUTANEOUS at 22:43

## 2018-02-10 RX ADMIN — MEMANTINE HYDROCHLORIDE 5 MILLIGRAM(S): 10 TABLET ORAL at 18:35

## 2018-02-10 RX ADMIN — HEPARIN SODIUM 5000 UNIT(S): 5000 INJECTION INTRAVENOUS; SUBCUTANEOUS at 14:01

## 2018-02-10 RX ADMIN — HEPARIN SODIUM 5000 UNIT(S): 5000 INJECTION INTRAVENOUS; SUBCUTANEOUS at 06:20

## 2018-02-10 NOTE — PROGRESS NOTE ADULT - PROBLEM SELECTOR PLAN 2
given cough and infiltrate, likely etiology Aspiration PNA (considering patient's AMS,lethargy and CT findings) vs less likely HAP  Recommendation  - c/w zosyn for 5 day course  -if cough remains productive ;consider sputum culture  -appreciate speech and swallow recommendations  -aspiration precautions advised with recommendation HOB elevation >30 degrees  -Oral hygiene  -RVP-ve  -Thank you for the consult ;will continue to follow.

## 2018-02-10 NOTE — PROGRESS NOTE ADULT - ATTENDING COMMENTS
Recs:  -Cont. curr. mgmt. and supportive tx.  -Pt can benefit from continuation of EO  -Cont. f/u w/ Dr. Shaikh

## 2018-02-10 NOTE — PROGRESS NOTE ADULT - SUBJECTIVE AND OBJECTIVE BOX
Pt seen and chart reviewed. Case discussed with staff. Pt initially sleeping, but awakens easily to voice, and is conversational, though sig. confused. She is A+Ox0. She denies acute c/o and denies anxiety or mood disturbance. She is calmer and not attempting OOB at this time though continues to make attempts at other times Reorientation and encouragement provided.

## 2018-02-10 NOTE — PROGRESS NOTE ADULT - SUBJECTIVE AND OBJECTIVE BOX
Interval Events:  Patient seen and examined at bedside. Pt sitting in chair without oxygen and feeling "ok". She is upset by the way someone treated her, but doesn't want to go into further details and is otherwise, physically, with no complaints. Per the daughter-in-law, she is still having a cough.        MEDICATIONS:  Pulmonary:  ALBUTerol/ipratropium for Nebulization 3 milliLiter(s) Nebulizer every 6 hours PRN    Antimicrobials:  piperacillin/tazobactam IVPB. 3.375 Gram(s) IV Intermittent every 6 hours    Anticoagulants:  aspirin enteric coated 81 milliGRAM(s) Oral daily  heparin  Injectable 5000 Unit(s) SubCutaneous every 8 hours    Cardiac:  losartan 100 milliGRAM(s) Oral daily      Allergies    doxycycline (Unknown)    Intolerances    hydrochlorothiazide (Other)      Vital Signs Last 24 Hrs  T(C): 36.8 (10 Feb 2018 09:04), Max: 36.9 (09 Feb 2018 17:00)  T(F): 98.3 (10 Feb 2018 09:04), Max: 98.5 (09 Feb 2018 17:00)  HR: 70 (10 Feb 2018 09:04) (62 - 76)  BP: 143/82 (10 Feb 2018 09:04) (82/53 - 148/74)  BP(mean): --  RR: 18 (10 Feb 2018 09:04) (16 - 19)  SpO2: 95% (10 Feb 2018 09:04) (94% - 96%)    02-09 @ 07:01  -  02-10 @ 07:00  --------------------------------------------------------  IN: 240 mL / OUT: 310 mL / NET: -70 mL          PHYSICAL EXAM:    General: Well developed; well nourished; in no acute distress  Eyes: PERRL, EOM intact; conjunctiva and sclera clear  ENMT: No nasal discharge; airway clear  Neck: Supple; non tender; no masses  Respiratory: bibasilar crackles  Cardiovascular: S1S2 RRR 2/6 systolic murmur at RSB  Gastrointestinal: Soft non-tender non-distended; Normal bowel sounds  Extremities: Normal range of motion, No clubbing, cyanosis or edema  Neurological: Alert and oriented x2  Skin: Warm and dry. No obvious rash    LABS:      CBC Full  -  ( 10 Feb 2018 08:06 )  WBC Count : 4.8 K/uL  Hemoglobin : 10.2 g/dL  Hematocrit : 30.2 %  Platelet Count - Automated : 273 K/uL  Mean Cell Volume : 91.2 fL  Mean Cell Hemoglobin : 30.8 pg  Mean Cell Hemoglobin Concentration : 33.8 g/dL    02-10    136  |  100  |  18  ----------------------------<  97  4.4   |  23  |  1.15    Ca    9.0      10 Feb 2018 08:06  Mg     2.2     02-10                        RADIOLOGY & ADDITIONAL STUDIES (The following images were personally reviewed):

## 2018-02-10 NOTE — PROGRESS NOTE ADULT - PROBLEM SELECTOR PLAN 1
- Likely due to hyponatremia, pt never with obvious SIRS criteria but had infiltrate on imaging and + cough so may have also contributed to AMS  - Appears to be at baseline, OOBTC, communicating well, mildly agitated this morning which may be 2/2 disagreement with a hospital worker vs. some mild delirium   - Aspiration precautions

## 2018-02-10 NOTE — PROGRESS NOTE ADULT - ASSESSMENT
89 y/o active female (lives alone w/24h HHA) w/ PMHx of HTN, HLD, Dementia (A&Ox1), hx Cerebral Artery Occlusion, pAfib (no AC, pt. new onset ~1mo ago) BIBA to [facility] ED (2/6/2018)  after change in functional status / lethargy per family 2/2 hyponatremia and aspiration pneumonia.

## 2018-02-10 NOTE — PROGRESS NOTE ADULT - SUBJECTIVE AND OBJECTIVE BOX
coverage for dr. meyer    Pt seen and examined  confused but calm, sitting in chair    REVIEW OF SYSTEMS:  confused      MEDICATIONS:  MEDICATIONS  (STANDING):  aspirin enteric coated 81 milliGRAM(s) Oral daily  atorvastatin 10 milliGRAM(s) Oral at bedtime  cholecalciferol 1000 Unit(s) Oral daily  galantamine ER 24 milliGRAM(s) Oral with breakfast  heparin  Injectable 5000 Unit(s) SubCutaneous every 8 hours  losartan 100 milliGRAM(s) Oral daily  memantine 5 milliGRAM(s) Oral two times a day  pantoprazole    Tablet 40 milliGRAM(s) Oral before breakfast  piperacillin/tazobactam IVPB. 3.375 Gram(s) IV Intermittent every 6 hours    MEDICATIONS  (PRN):  acetaminophen   Tablet 650 milliGRAM(s) Oral every 6 hours PRN For Temp greater than 38 C (100.4 F)  ALBUTerol/ipratropium for Nebulization 3 milliLiter(s) Nebulizer every 6 hours PRN Shortness of Breath and/or Wheezing      Allergies    doxycycline (Unknown)    Intolerances    hydrochlorothiazide (Other)      Vital Signs Last 24 Hrs  T(C): 36.8 (10 Feb 2018 09:04), Max: 36.9 (09 Feb 2018 17:00)  T(F): 98.3 (10 Feb 2018 09:04), Max: 98.5 (09 Feb 2018 17:00)  HR: 70 (10 Feb 2018 09:04) (62 - 76)  BP: 143/82 (10 Feb 2018 09:04) (82/53 - 148/74)  BP(mean): --  RR: 18 (10 Feb 2018 09:04) (16 - 19)  SpO2: 95% (10 Feb 2018 09:04) (94% - 96%)    02-09 @ 07:01  -  02-10 @ 07:00  --------------------------------------------------------  IN: 240 mL / OUT: 310 mL / NET: -70 mL        PHYSICAL EXAM:    General: Well developed; well nourished; in no acute distress  HEENT: MMM, conjunctiva and sclera clear  Lungs: clear  Heart: regular  Gastrointestinal: Soft non-tender non-distended; Normal bowel sounds; No hepatosplenomegaly  Skin: Warm and dry. No obvious rash  Ext: no edema    LABS:      CBC Full  -  ( 10 Feb 2018 08:06 )  WBC Count : 4.8 K/uL  Hemoglobin : 10.2 g/dL  Hematocrit : 30.2 %  Platelet Count - Automated : 273 K/uL  Mean Cell Volume : 91.2 fL  Mean Cell Hemoglobin : 30.8 pg  Mean Cell Hemoglobin Concentration : 33.8 g/dL  Auto Neutrophil # : x  Auto Lymphocyte # : x  Auto Monocyte # : x  Auto Eosinophil # : x  Auto Basophil # : x  Auto Neutrophil % : x  Auto Lymphocyte % : x  Auto Monocyte % : x  Auto Eosinophil % : x  Auto Basophil % : x    02-10    136  |  100  |  18  ----------------------------<  97  4.4   |  23  |  1.15    Ca    9.0      10 Feb 2018 08:06  Mg     2.2     02-10                        RADIOLOGY & ADDITIONAL STUDIES (The following images were personally reviewed):< from: CT Chest No Cont (02.07.18 @ 18:48) >  CT CHEST     < end of copied text >  < from: CT Chest No Cont (02.07.18 @ 18:48) >  1. Right middle lobe and right lower lobe consolidation consistent with   multifocal pneumonia.    2. Small right pleural effusion.    3. 14 mm groundglass opacity right upper lobe stable since 1/21/2016.   Recommend follow-up CT chest in 24 months to confirm stability.    < end of copied text >

## 2018-02-10 NOTE — PROGRESS NOTE ADULT - ATTENDING COMMENTS
Looks remarkably comfortable today with clear chest on exam, afebrile, SaO2 98% room air.  Walking in vines.  Likely community acquired pneumonia was etiology of acute illness.  Plans as above. Follow CXR to resolution allowing about one month for repeat if clinically well.

## 2018-02-10 NOTE — PROGRESS NOTE ADULT - PROBLEM SELECTOR PLAN 3
a 14 mm ground glass opacity seen in the right upper lobe   recommendation-   -the nodule has been stable since 1/21/2016.   -no acute intervention considering patient's age and comorbidities   -can follow up as an outpatient with Dr Moya.

## 2018-02-11 LAB
ANION GAP SERPL CALC-SCNC: 10 MMOL/L — SIGNIFICANT CHANGE UP (ref 5–17)
BUN SERPL-MCNC: 16 MG/DL — SIGNIFICANT CHANGE UP (ref 7–23)
CALCIUM SERPL-MCNC: 8.8 MG/DL — SIGNIFICANT CHANGE UP (ref 8.4–10.5)
CHLORIDE SERPL-SCNC: 102 MMOL/L — SIGNIFICANT CHANGE UP (ref 96–108)
CO2 SERPL-SCNC: 26 MMOL/L — SIGNIFICANT CHANGE UP (ref 22–31)
CREAT SERPL-MCNC: 1.13 MG/DL — SIGNIFICANT CHANGE UP (ref 0.5–1.3)
GLUCOSE SERPL-MCNC: 99 MG/DL — SIGNIFICANT CHANGE UP (ref 70–99)
HCT VFR BLD CALC: 27.5 % — LOW (ref 34.5–45)
HGB BLD-MCNC: 9.4 G/DL — LOW (ref 11.5–15.5)
MAGNESIUM SERPL-MCNC: 2.1 MG/DL — SIGNIFICANT CHANGE UP (ref 1.6–2.6)
MCHC RBC-ENTMCNC: 30.6 PG — SIGNIFICANT CHANGE UP (ref 27–34)
MCHC RBC-ENTMCNC: 34.2 G/DL — SIGNIFICANT CHANGE UP (ref 32–36)
MCV RBC AUTO: 89.6 FL — SIGNIFICANT CHANGE UP (ref 80–100)
PHOSPHATE SERPL-MCNC: 3.4 MG/DL — SIGNIFICANT CHANGE UP (ref 2.5–4.5)
PLATELET # BLD AUTO: 294 K/UL — SIGNIFICANT CHANGE UP (ref 150–400)
POTASSIUM SERPL-MCNC: 3.9 MMOL/L — SIGNIFICANT CHANGE UP (ref 3.5–5.3)
POTASSIUM SERPL-SCNC: 3.9 MMOL/L — SIGNIFICANT CHANGE UP (ref 3.5–5.3)
RBC # BLD: 3.07 M/UL — LOW (ref 3.8–5.2)
RBC # FLD: 14.1 % — SIGNIFICANT CHANGE UP (ref 10.3–16.9)
SODIUM SERPL-SCNC: 138 MMOL/L — SIGNIFICANT CHANGE UP (ref 135–145)
WBC # BLD: 6 K/UL — SIGNIFICANT CHANGE UP (ref 3.8–10.5)
WBC # FLD AUTO: 6 K/UL — SIGNIFICANT CHANGE UP (ref 3.8–10.5)

## 2018-02-11 PROCEDURE — 99231 SBSQ HOSP IP/OBS SF/LOW 25: CPT

## 2018-02-11 PROCEDURE — 99232 SBSQ HOSP IP/OBS MODERATE 35: CPT

## 2018-02-11 RX ADMIN — HEPARIN SODIUM 5000 UNIT(S): 5000 INJECTION INTRAVENOUS; SUBCUTANEOUS at 15:04

## 2018-02-11 RX ADMIN — GALANTAMINE HYDROBROMIDE 24 MILLIGRAM(S): 4 TABLET, FILM COATED ORAL at 10:32

## 2018-02-11 RX ADMIN — PIPERACILLIN AND TAZOBACTAM 200 GRAM(S): 4; .5 INJECTION, POWDER, LYOPHILIZED, FOR SOLUTION INTRAVENOUS at 07:12

## 2018-02-11 RX ADMIN — HEPARIN SODIUM 5000 UNIT(S): 5000 INJECTION INTRAVENOUS; SUBCUTANEOUS at 07:12

## 2018-02-11 RX ADMIN — PIPERACILLIN AND TAZOBACTAM 200 GRAM(S): 4; .5 INJECTION, POWDER, LYOPHILIZED, FOR SOLUTION INTRAVENOUS at 17:30

## 2018-02-11 RX ADMIN — Medication 81 MILLIGRAM(S): at 12:48

## 2018-02-11 RX ADMIN — PIPERACILLIN AND TAZOBACTAM 200 GRAM(S): 4; .5 INJECTION, POWDER, LYOPHILIZED, FOR SOLUTION INTRAVENOUS at 00:48

## 2018-02-11 RX ADMIN — LOSARTAN POTASSIUM 100 MILLIGRAM(S): 100 TABLET, FILM COATED ORAL at 07:12

## 2018-02-11 RX ADMIN — MEMANTINE HYDROCHLORIDE 5 MILLIGRAM(S): 10 TABLET ORAL at 07:12

## 2018-02-11 RX ADMIN — PIPERACILLIN AND TAZOBACTAM 200 GRAM(S): 4; .5 INJECTION, POWDER, LYOPHILIZED, FOR SOLUTION INTRAVENOUS at 12:48

## 2018-02-11 RX ADMIN — Medication 1000 UNIT(S): at 12:48

## 2018-02-11 RX ADMIN — PIPERACILLIN AND TAZOBACTAM 200 GRAM(S): 4; .5 INJECTION, POWDER, LYOPHILIZED, FOR SOLUTION INTRAVENOUS at 23:13

## 2018-02-11 RX ADMIN — PANTOPRAZOLE SODIUM 40 MILLIGRAM(S): 20 TABLET, DELAYED RELEASE ORAL at 07:12

## 2018-02-11 NOTE — PROGRESS NOTE ADULT - SUBJECTIVE AND OBJECTIVE BOX
Interval Events:  Patient seen and examined at bedside. Pt continues to seem frustrated about hospital stay, does not always answer questions appropriately, but seems to have no physical complaints. Mild cough and SOB, but ambulating without difficulty and off oxygen supplementation        MEDICATIONS:  Pulmonary:  ALBUTerol/ipratropium for Nebulization 3 milliLiter(s) Nebulizer every 6 hours PRN    Antimicrobials:  piperacillin/tazobactam IVPB. 3.375 Gram(s) IV Intermittent every 6 hours    Anticoagulants:  aspirin enteric coated 81 milliGRAM(s) Oral daily  heparin  Injectable 5000 Unit(s) SubCutaneous every 8 hours    Cardiac:  losartan 100 milliGRAM(s) Oral daily      Allergies    doxycycline (Unknown)    Intolerances    hydrochlorothiazide (Other)      Vital Signs Last 24 Hrs  T(C): 36.3 (11 Feb 2018 15:45), Max: 36.8 (11 Feb 2018 04:56)  T(F): 97.4 (11 Feb 2018 15:45), Max: 98.2 (11 Feb 2018 04:56)  HR: 70 (11 Feb 2018 15:45) (65 - 70)  BP: 119/74 (11 Feb 2018 15:45) (119/74 - 144/80)  BP(mean): --  RR: 20 (11 Feb 2018 15:45) (16 - 20)  SpO2: 98% (11 Feb 2018 15:45) (95% - 98%)    02-10 @ 07:01  -  02-11 @ 07:00  --------------------------------------------------------  IN: 115 mL / OUT: 0 mL / NET: 115 mL          PHYSICAL EXAM:    General: Well developed; well nourished; in no acute distress  Eyes: PERRL, EOM intact; conjunctiva and sclera clear  ENMT: No nasal discharge; airway clear  Neck: Supple; non tender; no masses  Respiratory: bibasilar crackles  Cardiovascular: S1S2 RRR 2/6 systolic murmur at RSB  Gastrointestinal: Soft non-tender non-distended; Normal bowel sounds  Extremities: Normal range of motion, No clubbing, cyanosis or edema  Neurological: Alert and oriented x2  Skin: Warm and dry. No obvious rash    LABS:      CBC Full  -  ( 11 Feb 2018 06:42 )  WBC Count : 6.0 K/uL  Hemoglobin : 9.4 g/dL  Hematocrit : 27.5 %  Platelet Count - Automated : 294 K/uL  Mean Cell Volume : 89.6 fL  Mean Cell Hemoglobin : 30.6 pg  Mean Cell Hemoglobin Concentration : 34.2 g/dL    02-11    138  |  102  |  16  ----------------------------<  99  3.9   |  26  |  1.13    Ca    8.8      11 Feb 2018 06:42  Phos  3.4     02-11  Mg     2.1     02-11                        RADIOLOGY & ADDITIONAL STUDIES (The following images were personally reviewed):

## 2018-02-11 NOTE — PROGRESS NOTE ADULT - SUBJECTIVE AND OBJECTIVE BOX
INTERVAL HPI/OVERNIGHT EVENTS:  Patient was seen and examined at bedside. As per nurse and patient, no o/n events, patient resting comfortably. Pt is slightly confused this morning however can be redirected easily. She denies having a cough, however is seen slightly coughing at bedside. She denies any production to the cough. Patient denies: fever, chills, dizziness, weakness, HA, Changes in vision, CP, palpitations, SOB, N/V/D/C, dysuria, changes in bowel movements, LE edema.     VITAL SIGNS:  T(F): 97.4 (02-11-18 @ 08:35)  HR: 66 (02-11-18 @ 08:35)  BP: 143/79 (02-11-18 @ 08:35)  RR: 16 (02-11-18 @ 08:35)  SpO2: 98% (02-11-18 @ 08:35)  Wt(kg): --    PHYSICAL EXAM:    Constitutional: WDWN, NAD  Eyes: PERRL, EOMI, sclera non-icteric  Neck: supple, trachea midline, no masses, no JVD  Respiratory: mild bibasilar crackles, improved from prior  Cardiovascular: RRR, normal S1S2, no M/R/G  Gastrointestinal: soft, NTND, no masses palpable, BS normal  Extremities: Warm, well perfused, pulses equal bilateral upper and lower extremities, no edema, no clubbing  Neurological: AAOx2, CN Grossly intact  Skin: Normal temperature, warm, dry    MEDICATIONS  (STANDING):  aspirin enteric coated 81 milliGRAM(s) Oral daily  atorvastatin 10 milliGRAM(s) Oral at bedtime  cholecalciferol 1000 Unit(s) Oral daily  galantamine ER 24 milliGRAM(s) Oral with breakfast  heparin  Injectable 5000 Unit(s) SubCutaneous every 8 hours  losartan 100 milliGRAM(s) Oral daily  memantine 5 milliGRAM(s) Oral two times a day  pantoprazole    Tablet 40 milliGRAM(s) Oral before breakfast  piperacillin/tazobactam IVPB. 3.375 Gram(s) IV Intermittent every 6 hours    MEDICATIONS  (PRN):  acetaminophen   Tablet 650 milliGRAM(s) Oral every 6 hours PRN For Temp greater than 38 C (100.4 F)  ALBUTerol/ipratropium for Nebulization 3 milliLiter(s) Nebulizer every 6 hours PRN Shortness of Breath and/or Wheezing      Allergies    doxycycline (Unknown)    Intolerances    hydrochlorothiazide (Other)      LABS:                        9.4    6.0   )-----------( 294      ( 11 Feb 2018 06:42 )             27.5     02-11    138  |  102  |  16  ----------------------------<  99  3.9   |  26  |  1.13    Ca    8.8      11 Feb 2018 06:42  Phos  3.4     02-11  Mg     2.1     02-11            RADIOLOGY & ADDITIONAL TESTS:

## 2018-02-11 NOTE — PROGRESS NOTE ADULT - PROBLEM SELECTOR PLAN 2
-Dr Bernabe/Malik initially consult. Patient’s family requesting Dr. Moya for pulomonary care.   Continued intermittent productive cough, denies SOB, no leukocytosis. RVP (2/7/2018): negative  - CXR on admission: right hilar infiltrate concerning for aspiration pneumonia.   - CT Chest 2/7: Right middle lobe and right lower lobe consolidation consistent with multifocal pneumonia, Small right pleural effusion.  14 mm groundglass opacity right upper lobe stable since 1/21/2016 (f/u CT 1yr).   Per Dr. Gan; clinically improved and lungs CTA.   -continue Zosyn for 5 days (day 4/5)  - Recent sinus infection, Paranasal X Ray (2/8/2018): No radiographic evidence of sinus inflammatory disease.    -Per family request as discussed with Dr. Stahl; standing 2L Nasal canula and chest PT TID ordered.

## 2018-02-11 NOTE — PROGRESS NOTE ADULT - SUBJECTIVE AND OBJECTIVE BOX
CC: ALTERED MENTAL STATUS  // HYPONATREMIA      INTERVAL HISTORY:89 yo F with AFib, htn, admitted with pna and hyponatremia thought to be 2/2 HCTZ as well as AMS vs worsenign dementia:    Pt says she "doesn't feel right" today but can't articulate. Family at bedside. chest wall pain b/l on deep inspiration. No headache/fevers. Eating and drinking well      ROS: No shortness of breath. No nausea.    PAST MEDICAL & SURGICAL HISTORY:  AS (aortic stenosis)  Atrial fibrillation: Pt. had new onset of Afib two weeks ago while hospitalized for UTI and Sinusitis.  No AC.  Pt. currently SR  Sinusitis  UTI (urinary tract infection): Pt. was recently hospitalized in [place] two weeks ago tx with ABx  Essential hypertension: HTN (hypertension)  Cerebral artery occlusion with cerebral infarction: CVA (cerebral infarction)  Hyperlipidemia: HLD (hyperlipidemia)  Memory loss: Memory loss  Fall: Falls  No significant past surgical history      PHYSICAL EXAM:  T(C): 36.3 (02-10-18 @ 16:16), Max: 36.8 (02-10-18 @ 05:20)  HR: 83 (02-10-18 @ 16:16)  BP: 100/62 (02-10-18 @ 16:16) (100/62 - 148/74)  RR: 18 (02-10-18 @ 16:16)  SpO2: 94% (02-10-18 @ 16:16)  Wt(kg): --  I&O's Summary    09 Feb 2018 07:01  -  10 Feb 2018 07:00  --------------------------------------------------------  IN: 240 mL / OUT: 310 mL / NET: -70 mL      Weight 57.1 (02-06 @ 18:51)    Appearance: alert, pleasant, NAD  ENT: oral mucosa moist, no pallor/cyanosis.  Neck: no JVD visible.  Cardiac: no rubs. no murmurs. Extremities: no edema  Skin: no rashes  Extremities (digits): no clubbing or cyanosis.  Respiratory: CTAB  Abdomen: soft. nontender. no masses.  Psych affect: not depressed.   Neuro: confused but redirectable    MEDICATIONS  (STANDING):  aspirin enteric coated 81 milliGRAM(s) Oral daily  atorvastatin 10 milliGRAM(s) Oral at bedtime  cholecalciferol 1000 Unit(s) Oral daily  galantamine ER 24 milliGRAM(s) Oral with breakfast  heparin  Injectable 5000 Unit(s) SubCutaneous every 8 hours  losartan 100 milliGRAM(s) Oral daily  memantine 5 milliGRAM(s) Oral two times a day  pantoprazole    Tablet 40 milliGRAM(s) Oral before breakfast  piperacillin/tazobactam IVPB. 3.375 Gram(s) IV Intermittent every 6 hours    MEDICATIONS  (PRN):  acetaminophen   Tablet 650 milliGRAM(s) Oral every 6 hours PRN For Temp greater than 38 C (100.4 F)  ALBUTerol/ipratropium for Nebulization 3 milliLiter(s) Nebulizer every 6 hours PRN Shortness of Breath and/or Wheezing      DATA:  136    |  100    |  18     ----------------------------<  97     Ca:9.0   (10 Feb 2018 08:06)  4.4     |  23     |  1.15       eGFR if Non : 42 <L>  eGFR if : 48 <L>    TPro  6.3    /  Alb  3.2<L>  /  TBili  0.3    /  DBili  x      /  AST  35     /  ALT  18     /  AlkPhos  60     08 Feb 2018 06:23                        10.2<L>  4.8   )-----------( 273      ( 10 Feb 2018 08:06 )             30.2<L>        Urinalysis Basic - ( 06 Feb 2018 15:27 )  Color: Yellow / Appearance: Clear / SG: <=1.005 / pH: x  Gluc: x / Ketone: NEGATIVE  / Bili: Negative / Urobili: 0.2 E.U./dL   Blood: x / Protein: NEGATIVE mg/dL / Nitrite: NEGATIVE   Leuk Esterase: Small<!!> / RBC: < 5 /HPF / WBC < 5 /HPF   Sq Epi: x / Non Sq Epi: 0-5 /HPF / Bacteria: Present /HPF<!!>

## 2018-02-11 NOTE — PROGRESS NOTE ADULT - SUBJECTIVE AND OBJECTIVE BOX
coverage for Dr. Barahona    Pt seen and examined alert out of bed, dementia    REVIEW OF SYSTEMS:  unable to express, seems omfortable      MEDICATIONS:  MEDICATIONS  (STANDING):  aspirin enteric coated 81 milliGRAM(s) Oral daily  atorvastatin 10 milliGRAM(s) Oral at bedtime  cholecalciferol 1000 Unit(s) Oral daily  galantamine ER 24 milliGRAM(s) Oral with breakfast  heparin  Injectable 5000 Unit(s) SubCutaneous every 8 hours  losartan 100 milliGRAM(s) Oral daily  memantine 5 milliGRAM(s) Oral two times a day  pantoprazole    Tablet 40 milliGRAM(s) Oral before breakfast  piperacillin/tazobactam IVPB. 3.375 Gram(s) IV Intermittent every 6 hours    MEDICATIONS  (PRN):  acetaminophen   Tablet 650 milliGRAM(s) Oral every 6 hours PRN For Temp greater than 38 C (100.4 F)  ALBUTerol/ipratropium for Nebulization 3 milliLiter(s) Nebulizer every 6 hours PRN Shortness of Breath and/or Wheezing      Allergies    doxycycline (Unknown)    Intolerances    hydrochlorothiazide (Other)      Vital Signs Last 24 Hrs  T(C): 36.3 (11 Feb 2018 15:45), Max: 36.8 (11 Feb 2018 04:56)  T(F): 97.4 (11 Feb 2018 15:45), Max: 98.2 (11 Feb 2018 04:56)  HR: 70 (11 Feb 2018 15:45) (65 - 83)  BP: 119/74 (11 Feb 2018 15:45) (100/62 - 144/80)  BP(mean): --  RR: 20 (11 Feb 2018 15:45) (16 - 20)  SpO2: 98% (11 Feb 2018 15:45) (94% - 98%)    02-10 @ 07:01  -  02-11 @ 07:00  --------------------------------------------------------  IN: 115 mL / OUT: 0 mL / NET: 115 mL        PHYSICAL EXAM:    General: Well developed; well nourished; in no acute distress  HEENT: MMM, conjunctiva and sclera clear  Lungs: sounds clear  Heart: regular  Gastrointestinal: Soft non-tender non-distended; Normal bowel sounds; No hepatosplenomegaly  Skin: Warm and dry. No obvious rash    LABS:      CBC Full  -  ( 11 Feb 2018 06:42 )  WBC Count : 6.0 K/uL  Hemoglobin : 9.4 g/dL  Hematocrit : 27.5 %  Platelet Count - Automated : 294 K/uL  Mean Cell Volume : 89.6 fL  Mean Cell Hemoglobin : 30.6 pg  Mean Cell Hemoglobin Concentration : 34.2 g/dL  Auto Neutrophil # : x  Auto Lymphocyte # : x  Auto Monocyte # : x  Auto Eosinophil # : x  Auto Basophil # : x  Auto Neutrophil % : x  Auto Lymphocyte % : x  Auto Monocyte % : x  Auto Eosinophil % : x  Auto Basophil % : x    02-11    138  |  102  |  16  ----------------------------<  99  3.9   |  26  |  1.13    Ca    8.8      11 Feb 2018 06:42  Phos  3.4     02-11  Mg     2.1     02-11                        RADIOLOGY & ADDITIONAL STUDIES (The following images were personally reviewed):

## 2018-02-11 NOTE — PROGRESS NOTE ADULT - SUBJECTIVE AND OBJECTIVE BOX
Notes reviewed; discussed with staff and patient's son; patient seen. Patient is alert and sitting up in a chair.  Patient is more difficult to engage today. Is more confused and is negativistic and argumentative Family is resistent to using neuroleptic medicationa at this time. They will attempt to have one of her aides from home come in and stay with her.  Continuing supportive treatment.

## 2018-02-11 NOTE — PROGRESS NOTE ADULT - PROBLEM SELECTOR PLAN 2
given cough and infiltrate, likely etiology Aspiration PNA (considering patient's AMS,lethargy and CT findings) vs less likely HAP  Recommendation  - complete zosyn course today, recommend d/c antibiotics tomorrw  -if cough remains productive ;consider sputum culture  -appreciate speech and swallow recommendations  -aspiration precautions advised with recommendation HOB elevation >30 degrees  -Oral hygiene  -RVP-ve  -Thank you for the consult ;will continue to follow.

## 2018-02-11 NOTE — PROGRESS NOTE ADULT - ASSESSMENT
# Hyponatremia - resolved - 2/2 HCTZ , bp well controlled without    # Anemia - stable    # HTN - well controlled on Losartan

## 2018-02-11 NOTE — PROGRESS NOTE ADULT - ASSESSMENT
89 y/o active female (lives alone w/24h HHA) w/ PMHx of HTN, HLD, Dementia (A&Ox1), hx Cerebral Artery Occlusion, pAfib (no AC, pt. new onset ~1mo ago) BIBA to [facility] ED (2/6/2018)  after change in functional status / lethargy per family 2/2 hyponatremia and aspiration pneumonia.

## 2018-02-11 NOTE — PROGRESS NOTE ADULT - PROBLEM SELECTOR PLAN 3
- H/o syncopal episode with negative work up 1/16/18 @ [facility]  -Episode of sinus bradycardia on outpatient Holter monitor.   -Telemetry monitoring this admission: revealed no eb or tachy arrhythmias, patient maintained sinus rhythm, no episodes of  atrial fibrillation during monitoring.   -Will not proceed with any EP intervention at this time.   - Prior Echo 1/16/18 with NL EF 60-65%, NL wall motion, No HD sig valvular disease.

## 2018-02-11 NOTE — PROGRESS NOTE ADULT - PROBLEM SELECTOR PLAN 4
-Dr. Cates   - Na 126 on admission likely in the setting of decreased PO intake over the past few days and taking HCTZ at home.  -NA improved: 138 mmol/L today  -Will continue to hold HCTZ therapy as discussed with Dr. Cates and encourage PO intake.

## 2018-02-11 NOTE — PROGRESS NOTE ADULT - ASSESSMENT
89 y/o F with a PMHx of HTN, HLD, Dementia, Cerebral artery occlusion, AS, pAfib (no AC) and recent hospitalization in [place] for UTI/Pyelo and Sinusitis X 4days (tx with Abx) two weeks ago while on vacation with family. Pt presented with lethargy, recent decreased PO intake, and altered mental status. Admitted for hyponatremia and aspiration PNA tx

## 2018-02-12 ENCOUNTER — TRANSCRIPTION ENCOUNTER (OUTPATIENT)
Age: 83
End: 2018-02-12

## 2018-02-12 LAB
ANION GAP SERPL CALC-SCNC: 10 MMOL/L — SIGNIFICANT CHANGE UP (ref 5–17)
APPEARANCE UR: CLEAR — SIGNIFICANT CHANGE UP
BILIRUB UR-MCNC: NEGATIVE — SIGNIFICANT CHANGE UP
BUN SERPL-MCNC: 13 MG/DL — SIGNIFICANT CHANGE UP (ref 7–23)
CALCIUM SERPL-MCNC: 9.1 MG/DL — SIGNIFICANT CHANGE UP (ref 8.4–10.5)
CHLORIDE SERPL-SCNC: 101 MMOL/L — SIGNIFICANT CHANGE UP (ref 96–108)
CO2 SERPL-SCNC: 27 MMOL/L — SIGNIFICANT CHANGE UP (ref 22–31)
COLOR SPEC: YELLOW — SIGNIFICANT CHANGE UP
CORTICOSTEROID BINDING GLOBULIN RESULT: 3 MG/DL — SIGNIFICANT CHANGE UP
CORTIS F/TOTAL MFR SERPL: 3.8 % — SIGNIFICANT CHANGE UP
CORTIS SERPL-MCNC: 12 UG/DL — SIGNIFICANT CHANGE UP
CORTISOL, FREE RESULT: 0.46 UG/DL — SIGNIFICANT CHANGE UP
CREAT SERPL-MCNC: 1.17 MG/DL — SIGNIFICANT CHANGE UP (ref 0.5–1.3)
DIFF PNL FLD: NEGATIVE — SIGNIFICANT CHANGE UP
GLUCOSE SERPL-MCNC: 92 MG/DL — SIGNIFICANT CHANGE UP (ref 70–99)
GLUCOSE UR QL: NEGATIVE — SIGNIFICANT CHANGE UP
HCT VFR BLD CALC: 29.9 % — LOW (ref 34.5–45)
HGB BLD-MCNC: 10.2 G/DL — LOW (ref 11.5–15.5)
KETONES UR-MCNC: NEGATIVE — SIGNIFICANT CHANGE UP
LEGIONELLA AG UR QL: NEGATIVE — SIGNIFICANT CHANGE UP
LEUKOCYTE ESTERASE UR-ACNC: NEGATIVE — SIGNIFICANT CHANGE UP
MAGNESIUM SERPL-MCNC: 2.1 MG/DL — SIGNIFICANT CHANGE UP (ref 1.6–2.6)
MCHC RBC-ENTMCNC: 30.4 PG — SIGNIFICANT CHANGE UP (ref 27–34)
MCHC RBC-ENTMCNC: 34.1 G/DL — SIGNIFICANT CHANGE UP (ref 32–36)
MCV RBC AUTO: 89.3 FL — SIGNIFICANT CHANGE UP (ref 80–100)
NITRITE UR-MCNC: NEGATIVE — SIGNIFICANT CHANGE UP
PH UR: 5 — SIGNIFICANT CHANGE UP (ref 5–8)
PLATELET # BLD AUTO: 323 K/UL — SIGNIFICANT CHANGE UP (ref 150–400)
POTASSIUM SERPL-MCNC: 4 MMOL/L — SIGNIFICANT CHANGE UP (ref 3.5–5.3)
POTASSIUM SERPL-SCNC: 4 MMOL/L — SIGNIFICANT CHANGE UP (ref 3.5–5.3)
PROT UR-MCNC: NEGATIVE MG/DL — SIGNIFICANT CHANGE UP
RBC # BLD: 3.35 M/UL — LOW (ref 3.8–5.2)
RBC # FLD: 14.1 % — SIGNIFICANT CHANGE UP (ref 10.3–16.9)
SODIUM SERPL-SCNC: 138 MMOL/L — SIGNIFICANT CHANGE UP (ref 135–145)
SP GR SPEC: 1.01 — SIGNIFICANT CHANGE UP (ref 1–1.03)
UROBILINOGEN FLD QL: 0.2 E.U./DL — SIGNIFICANT CHANGE UP
WBC # BLD: 5.1 K/UL — SIGNIFICANT CHANGE UP (ref 3.8–10.5)
WBC # FLD AUTO: 5.1 K/UL — SIGNIFICANT CHANGE UP (ref 3.8–10.5)

## 2018-02-12 PROCEDURE — 99233 SBSQ HOSP IP/OBS HIGH 50: CPT

## 2018-02-12 PROCEDURE — 99231 SBSQ HOSP IP/OBS SF/LOW 25: CPT

## 2018-02-12 RX ADMIN — PANTOPRAZOLE SODIUM 40 MILLIGRAM(S): 20 TABLET, DELAYED RELEASE ORAL at 07:33

## 2018-02-12 RX ADMIN — HEPARIN SODIUM 5000 UNIT(S): 5000 INJECTION INTRAVENOUS; SUBCUTANEOUS at 13:23

## 2018-02-12 RX ADMIN — MEMANTINE HYDROCHLORIDE 5 MILLIGRAM(S): 10 TABLET ORAL at 07:33

## 2018-02-12 RX ADMIN — HEPARIN SODIUM 5000 UNIT(S): 5000 INJECTION INTRAVENOUS; SUBCUTANEOUS at 07:33

## 2018-02-12 RX ADMIN — PIPERACILLIN AND TAZOBACTAM 200 GRAM(S): 4; .5 INJECTION, POWDER, LYOPHILIZED, FOR SOLUTION INTRAVENOUS at 07:33

## 2018-02-12 RX ADMIN — LOSARTAN POTASSIUM 100 MILLIGRAM(S): 100 TABLET, FILM COATED ORAL at 07:33

## 2018-02-12 RX ADMIN — Medication 81 MILLIGRAM(S): at 13:22

## 2018-02-12 RX ADMIN — MEMANTINE HYDROCHLORIDE 5 MILLIGRAM(S): 10 TABLET ORAL at 19:14

## 2018-02-12 RX ADMIN — Medication 1000 UNIT(S): at 13:23

## 2018-02-12 RX ADMIN — HEPARIN SODIUM 5000 UNIT(S): 5000 INJECTION INTRAVENOUS; SUBCUTANEOUS at 21:37

## 2018-02-12 NOTE — DISCHARGE NOTE ADULT - SECONDARY DIAGNOSIS.
Altered mental status, unspecified altered mental status type Dementia without behavioral disturbance, unspecified dementia type Essential hypertension Hyponatremia Lethargy

## 2018-02-12 NOTE — PROGRESS NOTE ADULT - PROBLEM SELECTOR PROBLEM 7
Sinusitis
Discharge planning issues

## 2018-02-12 NOTE — PROGRESS NOTE ADULT - PROBLEM SELECTOR PLAN 1
- Likely due to hyponatremia, pt never with obvious SIRS criteria but had infiltrate on imaging and + cough so may have also contributed to AMS  - Mental status is improving  - Appears to be at baseline, OOBTC, communicating well , off supplemental oxygen  - Aspiration precautions

## 2018-02-12 NOTE — PROGRESS NOTE ADULT - PROBLEM SELECTOR PROBLEM 6
Altered mental status, unspecified altered mental status type
Essential hypertension
Hyperlipidemia

## 2018-02-12 NOTE — DISCHARGE NOTE ADULT - HOSPITAL COURSE
89 y/o active female (lives alone w/24h HHA) w/ PMHx of HTN, HLD, Dementia (A&Ox1), hx Cerebral Artery Occlusion, pAfib (no AC, pt. new onset ~1mo ago 2/2 to UTI/Pyelo in [place]; currently SR) presented to [facility] ED 1/16 after change in functional status / lethargy per family. Pt's son stated that upon arriving to her apt to take her to breakfast, she was slumped over and lethargic. Normally she walks on her own, but she needed her son and daughter-in-law to help her down the stairs. The aid reported that she had a decreased PO intake over the past few days but no new symptoms or complaints aside from mild back pain since her last admission to [facility] for syncope work up. Denied fever, chills, recent illness, sick contacts, chest pain, SOB, HAYDEN, orthopnea, LE edema, n/v/d, abd pain, dysuria, hematuria. Pt herself denied any symptoms however A&Ox1. Of note, patient was recently hospitalized December 2017 in [place], [place] for UTI/Pyelo and sinusitis and treated with IV abx, and upon return to [place], had a syncopal episode 1/15 and was admitted to [facility] with a negative work up including CTA brain, Echo 1/16 revealing NL LV wall motion, LVEF NL 60-65%, No AS. Carotid US revealed mild-mod dz, TSH WNL, and UA repeated which was negative for UTI and urine culture negative. Pt was given a Holter Monitor outpt by Dr. Stahl which revealed no atrial fibrillation, but bradycardia to 40s. In the ED VSS, afebrile, ECG NSR @ 61 BPM no acute STT changes, CT Head unchanged, labs sig for troponin negative x1, Na 126, K 3.8. Pt admitted to [facility] for further monitoring, EP consult, and work up for possible sick sinus syndrome.  Altered mental status workup showed unremarkable CT/CTA head and normal ECHO.  Tsh was normal.  Neuro was consulted.  Her HCTZ was discontinued due to hyponatremia.  Normalization of hyponatremia resolved her altered mental status.  Psych recommended continuation of nemanda and galantamine.  CXR showed questionable R infiltrate.  CT chest showed RML and RLL infiltrate.  She was treated with zosyn x 5 days.  Vascular was consulted for very mild LE swelling bilaterally, and Dopplers revealed no LE DVT.  She was discharged with instruction to follow up with Dr. Barahona, Dr. Moya, and Dr. Stahl.

## 2018-02-12 NOTE — DISCHARGE NOTE ADULT - CARE PLAN
Principal Discharge DX:	Pneumonia due to infectious organism, unspecified laterality, unspecified part of lung  Goal:	no recurrence  Assessment and plan of treatment:	Please follow up with your PCP within one week  Secondary Diagnosis:	Altered mental status, unspecified altered mental status type  Goal:	no recurrence  Assessment and plan of treatment:	Please stop taking BP medication containing hydrochlorothiazide.  Follow up with your PCP.  Secondary Diagnosis:	Dementia without behavioral disturbance, unspecified dementia type  Goal:	prevent complications  Assessment and plan of treatment:	Continue taking your galantamine, Namenda after discharge  Secondary Diagnosis:	Essential hypertension  Goal:	normal BP  Assessment and plan of treatment:	Continue taking your BP medications as directed.  Follow up with Dr. Stahl  Secondary Diagnosis:	Hyponatremia  Goal:	prevent complications  Assessment and plan of treatment:	discontinue hydrochlorothiazide Principal Discharge DX:	Pneumonia due to infectious organism, unspecified laterality, unspecified part of lung  Goal:	no recurrence  Assessment and plan of treatment:	You have completed you antibiotic course. Please follow up with your PCP within one week  Secondary Diagnosis:	Dementia without behavioral disturbance, unspecified dementia type  Goal:	no recurrence  Assessment and plan of treatment:	Continue taking your galantamine, Namenda after discharge  Secondary Diagnosis:	Essential hypertension  Goal:	prevent complications  Assessment and plan of treatment:	discontinue hydrochlorothiazide. Follow up with Dr. Stahl  Secondary Diagnosis:	Hyponatremia  Goal:	normal BP  Assessment and plan of treatment:	discontinue hydrochlorothiazide. Follow up with Dr. Stahl  Secondary Diagnosis:	Lethargy  Goal:	prevent complications  Assessment and plan of treatment:	Please stop taking BP medication containing hydrochlorothiazide.  Follow up with your PCP.

## 2018-02-12 NOTE — DISCHARGE NOTE ADULT - ADDITIONAL INSTRUCTIONS
Continue taking your medications as directed.  Please follow up with Brooklyn Chacon and Maribeth.  Return to an ER with any new or worsening symptoms.

## 2018-02-12 NOTE — PROGRESS NOTE ADULT - SUBJECTIVE AND OBJECTIVE BOX
INTERVAL HPI/OVERNIGHT EVENTS:  Seen and examined at bedside.  Pt reports feeling well.  Denies SOB, cough, F/C, CP, palpitations, abdominal pain, N/V/D/C, dysuria.     ROS otherwise negative    VITAL SIGNS:  T(F): 97.4 (02-12-18 @ 09:29)  HR: 75 (02-12-18 @ 09:29)  BP: 109/72 (02-12-18 @ 09:29)  RR: 16 (02-12-18 @ 09:29)  SpO2: 97% (02-12-18 @ 09:29)  Wt(kg): --    PHYSICAL EXAM:    Constitutional:  NAD  Eyes:  EOMI, PERRL, sclera and conjunctiva clear  ENMT:  MMM, oropharynx WNL  Neck:  Supple, trachea midline, no JVD or LAD, no thyromegaly  Respiratory:  CTAB no wheezing rhonchi or rales  Cardiovascular:  S1S2 no m/r/g  Gastrointestinal:  NABS.  Soft, nontender, nondistended.  No guarding.  No organomegaly.  Extremities:  No edema.  No cyanosis.    Vascular:  peripheral pulses palpable b/l  Neurological:  Nonfocal.  Musculoskeletal:  No atrophy    MEDICATIONS  (STANDING):  aspirin enteric coated 81 milliGRAM(s) Oral daily  atorvastatin 10 milliGRAM(s) Oral at bedtime  cholecalciferol 1000 Unit(s) Oral daily  galantamine ER 24 milliGRAM(s) Oral with breakfast  heparin  Injectable 5000 Unit(s) SubCutaneous every 8 hours  losartan 100 milliGRAM(s) Oral daily  memantine 5 milliGRAM(s) Oral two times a day  pantoprazole    Tablet 40 milliGRAM(s) Oral before breakfast    MEDICATIONS  (PRN):  acetaminophen   Tablet 650 milliGRAM(s) Oral every 6 hours PRN For Temp greater than 38 C (100.4 F)  ALBUTerol/ipratropium for Nebulization 3 milliLiter(s) Nebulizer every 6 hours PRN Shortness of Breath and/or Wheezing      Allergies    doxycycline (Unknown)    Intolerances    hydrochlorothiazide (Other)      LABS:                        10.2   5.1   )-----------( 323      ( 12 Feb 2018 06:04 )             29.9     02-12    138  |  101  |  13  ----------------------------<  92  4.0   |  27  |  1.17    Ca    9.1      12 Feb 2018 06:04  Phos  3.4     02-11  Mg     2.1     02-12            RADIOLOGY & ADDITIONAL TESTS:

## 2018-02-12 NOTE — PROGRESS NOTE ADULT - PROBLEM SELECTOR PLAN 1
likely 2/2 hyponatremia and aspiration pneumonia. Hyponatremia resolved.  - CT head (2/6/2018): negative on this admission.    - TSH WNL  - Carotid US no HD sig stenosis.   - Dr Mcrae has no further neuro recommendations  - Psychiatry (Dr. Shaikh) consulted for altered mental status/Demenita: Continue Namenda 5mg BID, Galantamine (family to bring)  - Continue Enhanced supervision/fall risk.   - Speech and swallow consulted and cleared patient for regular diet.  - Aspiration precautions

## 2018-02-12 NOTE — PROGRESS NOTE ADULT - ASSESSMENT
pt with hyponatremia, resolved after cessation of thzdes  and NS, now eunatremic with return to nl mentation.   being followed by pulmonary for cough, and remains euvolemic. will continue current rx.

## 2018-02-12 NOTE — PROGRESS NOTE ADULT - PROBLEM SELECTOR PLAN 3
-H/o syncopal episode with negative work up 1/16/18 @ [facility]  -Episode of sinus bradycardia on outpatient Holter monitor.   -Telemetry monitoring this admission: revealed no eb or tachy arrhythmias, patient maintained sinus rhythm, no episodes of  atrial fibrillation during monitoring.   -Will not proceed with any EP intervention at this time.   - Prior Echo 1/16/18 with NL EF 60-65%, NL wall motion, No HD sig valvular disease.

## 2018-02-12 NOTE — PROGRESS NOTE ADULT - PROBLEM SELECTOR PLAN 7
PT evaluation patient: recommends home PT and currently has 24 hour aide at home.   -Dr. Kramer consulted    Dispo: [facility].  Completed course of abx

## 2018-02-12 NOTE — PROGRESS NOTE ADULT - SUBJECTIVE AND OBJECTIVE BOX
Interval Events:  Patient seen and examined at bedside. No acute events in the past 24 hours .Daughter in law is concerned about the ankle swelling. Patient as well daughter-in-law denies any fever/chills/sob/need for supplemental oxygen /chest pain or palpitations .Still has intermittent episodes of non productive cough . Patient has been ambulating and mental status is improving .  12 Point ROS is negative except as mentioned above    MEDICATIONS:    acetaminophen   Tablet 650 milliGRAM(s) Oral every 6 hours PRN  ALBUTerol/ipratropium for Nebulization 3 milliLiter(s) Nebulizer every 6 hours PRN  aspirin enteric coated 81 milliGRAM(s) Oral daily  atorvastatin 10 milliGRAM(s) Oral at bedtime  cholecalciferol 1000 Unit(s) Oral daily  galantamine ER 24 milliGRAM(s) Oral with breakfast  heparin  Injectable 5000 Unit(s) SubCutaneous every 8 hours  losartan 100 milliGRAM(s) Oral daily  memantine 5 milliGRAM(s) Oral two times a day  pantoprazole    Tablet 40 milliGRAM(s) Oral before breakfast      Allergies    doxycycline (Unknown)    Intolerances    hydrochlorothiazide (Other)      Vital Signs Last 24 Hrs  T(C): 36.3 (12 Feb 2018 09:29), Max: 36.9 (12 Feb 2018 05:51)  T(F): 97.4 (12 Feb 2018 09:29), Max: 98.4 (12 Feb 2018 05:51)  HR: 75 (12 Feb 2018 09:29) (59 - 75)  BP: 109/72 (12 Feb 2018 09:29) (109/72 - 148/78)  BP(mean): --  RR: 16 (12 Feb 2018 09:29) (16 - 20)  SpO2: 97% (12 Feb 2018 09:29) (97% - 99%)    02-11 @ 07:01  -  02-12 @ 07:00  --------------------------------------------------------  IN: 100 mL / OUT: 0 mL / NET: 100 mL        General: NAD, AAO x2  HEENT: No icterus,. Moist mucous membranes  Neck: No JVD noted. Supple, no meningismus  Cardio: S1, S2 noted, 2/6 systolic murmur at RSB  Resp: crackles + b/l Right >Left .no wheezing  Abdo: Soft, NT, bowel sounds present. No organomegaly  Extremities: b/l LE 1+ pretibial edema noted. Pulses present b/l  Neuro: AAO x2, grossly normal motor strength.  Lymphnodes: no lymphadenopathy identified.  Skin: Dry, no rashes  LABS:      CBC Full  -  ( 12 Feb 2018 06:04 )  WBC Count : 5.1 K/uL  Hemoglobin : 10.2 g/dL  Hematocrit : 29.9 %  Platelet Count - Automated : 323 K/uL  Mean Cell Volume : 89.3 fL  Mean Cell Hemoglobin : 30.4 pg  Mean Cell Hemoglobin Concentration : 34.1 g/dL    02-12    138  |  101  |  13  ----------------------------<  92  4.0   |  27  |  1.17    Ca    9.1      12 Feb 2018 06:04  Phos  3.4     02-11  Mg     2.1     02-12        RADIOLOGY & ADDITIONAL STUDIES (The following images were personally reviewed):< from: CT Chest No Cont (02.07.18 @ 18:48) >   Right middle lobe and right lower lobe consolidation consistent with multifocal pneumonia.  2. Small right pleural effusion.  3. 14 mm groundglass opacity right upper lobe stable since 1/21/2016. Recommend follow-up CT chest in 24 months to confirm stability.    < end of copied text >

## 2018-02-12 NOTE — PROGRESS NOTE ADULT - PROBLEM SELECTOR PROBLEM 1
Multifocal pneumonia
Altered mental status, unspecified altered mental status type
Atrial fibrillation
Dementia
Dementia
Altered mental status, unspecified altered mental status type

## 2018-02-12 NOTE — DISCHARGE NOTE ADULT - PLAN OF CARE
no recurrence Please follow up with your PCP within one week Please stop taking BP medication containing hydrochlorothiazide.  Follow up with your PCP. prevent complications Continue taking your galantamine, Namenda after discharge normal BP Continue taking your BP medications as directed.  Follow up with Dr. Stahl discontinue hydrochlorothiazide discontinue hydrochlorothiazide. Follow up with Dr. Stahl You have completed you antibiotic course. Please follow up with your PCP within one week

## 2018-02-12 NOTE — PROGRESS NOTE ADULT - ATTENDING COMMENTS
I have examined the patient and discussed management with her and her family. As per history obtained, the patient ambulated today.   She remains afebrile. Alert. Responds appropriately to commands. Good air entry. No wheezing. Few scattered crackles post and inferiorly  Normal cardiovascular exam.  Have discussed 14 mm GGO finding in RUL with the patient. It is unchanged since 2014. We will perform a 6 month follow up of this nodule  CXR over the weekend reviewed. Agree with stopping antibiotics  Use Aerobika and incentive spirometry 4-5 times a day I have examined the patient and discussed management with her and her family. As per history obtained, the patient ambulated today.   She remains afebrile. Alert. Responds appropriately to commands. Good air entry. No wheezing. Few scattered crackles post and inferiorly  Normal cardiovascular exam.  Have discussed 14 mm GGO finding in RUL with the patient. It is unchanged since 2014. We will perform a 6 month follow up of this nodule  CXR: no new films.   Agree with stopping antibiotics  Use Aerobika and incentive spirometry 4-5 times a day

## 2018-02-12 NOTE — DISCHARGE NOTE ADULT - CARE PROVIDERS DIRECT ADDRESSES
[email or web address]

## 2018-02-12 NOTE — CONSULT NOTE ADULT - CONSULT REASON
multifocal PNA
Altered MS
Change in Mental Status
LE swelling
PM&R evaluation
bradycardia
hyponatremia

## 2018-02-12 NOTE — DISCHARGE NOTE ADULT - MEDICATION SUMMARY - MEDICATIONS TO STOP TAKING
I will STOP taking the medications listed below when I get home from the hospital:    losartan-hydroCHLOROthiazide 100 mg-25 mg oral tablet  -- 1 tab(s) by mouth once a day

## 2018-02-12 NOTE — PROGRESS NOTE ADULT - SUBJECTIVE AND OBJECTIVE BOX
Neurology Follow up note    Name  SAVANAH ISAAC    HPI:  89 y/o active female (lives alone w/24h HHA) w/ PMHx of HTN, HLD, Dementia (A&Ox1), hx Cerebral Artery Occlusion, pAfib (no AC, pt. new onset ~1mo ago 2/2 to UTI/Pyelo in [place]; currently SR) presented to [facility] ED 1/16 after change in functional status / lethargy per family. Pt's son stated that upon arriving to her apt this AM to take her to breakfast, she was slumped over and lethargic. Normally she walks on her own, but she needed her son and daughter-in-law to help her down the stairs. The aid reported that she had a decreased PO intake over the past few days but no new symptoms or complaints aside from mild back pain since her last admission to [facility] for syncope work up. Denied fever, chills, recent illness, sick contacts, chest pain, SOB, HAYDEN, orthopnea, LE edema, n/v/d, abd pain, dysuria, hematuria. Pt herself denies any symptoms however A&Ox1. Of note, patient was recently hospitalized December 2017 in [place], [place] for UTI/Pyelo and sinusitis and treated with IV abx, and upon return to [place], had a syncopal episode 1/15 and was admitted to [facility] with a negative work up including CTA brain, Echo 1/16 revealing NL LV wall motion, LVEF NL 60-65%, No AS. Carotid US revealed mild-mod dz, TSH WNL, and UA repeated which was negative for UTI and urine culture negative. Pt was given a Holter Monitor otpt by Dr. Stahl which revealed no atrial fibrillation, but bradycardia to 40s. In the ED today VSS, afebrile, ECG NSR @ 61 BPM no acute STT changes, CT Head unchanged, labs sig for troponin negative x1, Na 126, K 3.8. Pt will be admitted to [facility] for further monitoring, EP consult, and work up for possible sick sinus syndrome. (06 Feb 2018 13:03)      Interval History - no change in neuro status        REVIEW OF SYSTEMS    Vital Signs Last 24 Hrs  T(C): 36.9 (12 Feb 2018 05:51), Max: 36.9 (12 Feb 2018 05:51)  T(F): 98.4 (12 Feb 2018 05:51), Max: 98.4 (12 Feb 2018 05:51)  HR: 59 (12 Feb 2018 05:51) (59 - 70)  BP: 148/78 (12 Feb 2018 05:51) (112/75 - 148/78)  BP(mean): --  RR: 18 (12 Feb 2018 05:51) (16 - 20)  SpO2: 99% (12 Feb 2018 05:51) (98% - 99%)    Physical Exam-     Mental Status- awake and alert    Cranial Nerves- full EOM    Gait and station- n/a    Motor-full EOM    Reflexes- decreased    Sensation- no sensory level    Coordination- no tremors    Vascular -no bruits    Medications  acetaminophen   Tablet 650 milliGRAM(s) Oral every 6 hours PRN  ALBUTerol/ipratropium for Nebulization 3 milliLiter(s) Nebulizer every 6 hours PRN  aspirin enteric coated 81 milliGRAM(s) Oral daily  atorvastatin 10 milliGRAM(s) Oral at bedtime  cholecalciferol 1000 Unit(s) Oral daily  galantamine ER 24 milliGRAM(s) Oral with breakfast  heparin  Injectable 5000 Unit(s) SubCutaneous every 8 hours  losartan 100 milliGRAM(s) Oral daily  memantine 5 milliGRAM(s) Oral two times a day  pantoprazole    Tablet 40 milliGRAM(s) Oral before breakfast  piperacillin/tazobactam IVPB. 3.375 Gram(s) IV Intermittent every 6 hours      Lab      Radiology    Assessment- Altered mental status    Plan- as per medical team

## 2018-02-12 NOTE — CONSULT NOTE ADULT - SUBJECTIVE AND OBJECTIVE BOX
Vascular Attending: Dr. Osuna  HPI:  89 y/o active female (lives alone w/24h HHA) w/ PMHx of HTN, HLD, Dementia (A&Ox1), hx Cerebral Artery Occlusion, pAfib (no AC, pt. new onset ~1mo ago 2/2 to UTI/Pyelo in [place]; currently SR) presented to [facility] ED on 2/6/18 with lethargy and altered mental status found to have hyponatremia and aspiration PNA for which she is been treated.  Pt's family noted B/l LE swelling couple of days ago and expressed concern today because there has not been any significant improvement with the swelling. Pt denies any pain b/l, she's able to walk.  There are no erythema or ulcers.         PAST MEDICAL & SURGICAL HISTORY:  AS (aortic stenosis)  Atrial fibrillation: Pt. had new onset of Afib two weeks ago while hospitalized for UTI and Sinusitis.  No AC.  Pt. currently SR  Sinusitis  UTI (urinary tract infection): Pt. was recently hospitalized in [place] two weeks ago tx with ABx  Essential hypertension: HTN (hypertension)  Cerebral artery occlusion with cerebral infarction: CVA (cerebral infarction)  Hyperlipidemia: HLD (hyperlipidemia)  Memory loss: Memory loss  Fall: Falls  No significant past surgical history    Allergic/Immunologic:	    MEDICATIONS  (STANDING):  aspirin enteric coated 81 milliGRAM(s) Oral daily  atorvastatin 10 milliGRAM(s) Oral at bedtime  cholecalciferol 1000 Unit(s) Oral daily  galantamine ER 24 milliGRAM(s) Oral with breakfast  heparin  Injectable 5000 Unit(s) SubCutaneous every 8 hours  losartan 100 milliGRAM(s) Oral daily  memantine 5 milliGRAM(s) Oral two times a day  pantoprazole    Tablet 40 milliGRAM(s) Oral before breakfast    MEDICATIONS  (PRN):  acetaminophen   Tablet 650 milliGRAM(s) Oral every 6 hours PRN For Temp greater than 38 C (100.4 F)  ALBUTerol/ipratropium for Nebulization 3 milliLiter(s) Nebulizer every 6 hours PRN Shortness of Breath and/or Wheezing    Allergies    doxycycline (Unknown)    Intolerances    hydrochlorothiazide (Other)    SOCIAL HISTORY:    FAMILY HISTORY:    Vital Signs Last 24 Hrs  T(C): 36.7 (12 Feb 2018 16:36), Max: 37.1 (12 Feb 2018 15:46)  T(F): 98 (12 Feb 2018 16:36), Max: 98.8 (12 Feb 2018 15:46)  HR: 71 (12 Feb 2018 16:36) (59 - 75)  BP: 119/76 (12 Feb 2018 16:36) (106/69 - 148/78)  BP(mean): --  RR: 18 (12 Feb 2018 16:36) (16 - 20)  SpO2: 97% (12 Feb 2018 16:36) (95% - 99%)    PHYSICAL EXAM:  Constitutional: NAD, awake, alert  Respiratory: Unlabored breathing, no respiratory distress  Cardiovascular: RRR  Extremities: Mild B/L LE swelling, worse on right. Swelling is from the ankles to the midleg, no tenderness to palpation, motor function intact  Vascular: Palpable DP/Pop/Fem pulse      LABS:                        10.2   5.1   )-----------( 323      ( 12 Feb 2018 06:04 )             29.9     02-12    138  |  101  |  13  ----------------------------<  92  4.0   |  27  |  1.17    Ca    9.1      12 Feb 2018 06:04  Phos  3.4     02-11  Mg     2.1     02-12    RADIOLOGY & ADDITIONAL STUDIES    Assessment/Plan:  89 y/o active female (lives alone w/24h HHA) w/ PMHx of HTN, HLD, Dementia (A&Ox1), hx Cerebral Artery Occlusion, pAfib (no AC, pt. new onset ~1mo ago 2/2 to UTI/Pyelo in [place]; currently SR) admitted for aspiration pneumonia and hyponatremia now with B/L LE swelling    Obtain a b/l LE duplex US  Rest of plan as per primary team  Plan discussed with vascular chief on call Vascular Attending: Dr. Osuna  HPI:  89 y/o active female (lives alone w/24h HHA) w/ PMHx of HTN, HLD, Dementia (A&Ox1), hx Cerebral Artery Occlusion, pAfib (no AC, pt. new onset ~1mo ago 2/2 to UTI/Pyelo in [place]; currently SR) presented to [facility] ED on 2/6/18 with lethargy and altered mental status found to have hyponatremia and aspiration PNA for which she is been treated.  Pt's family noted B/l LE swelling couple of days ago and expressed concern today because there has not been any significant improvement with the swelling. Pt denies any pain b/l, she's able to walk.  There are no erythema or ulcers.         PAST MEDICAL & SURGICAL HISTORY:  AS (aortic stenosis)  Atrial fibrillation: Pt. had new onset of Afib two weeks ago while hospitalized for UTI and Sinusitis.  No AC.  Pt. currently SR  Sinusitis  UTI (urinary tract infection): Pt. was recently hospitalized in [place] two weeks ago tx with ABx  Essential hypertension: HTN (hypertension)  Cerebral artery occlusion with cerebral infarction: CVA (cerebral infarction)  Hyperlipidemia: HLD (hyperlipidemia)  Memory loss: Memory loss  Fall: Falls  No significant past surgical history    Allergic/Immunologic:	    MEDICATIONS  (STANDING):  aspirin enteric coated 81 milliGRAM(s) Oral daily  atorvastatin 10 milliGRAM(s) Oral at bedtime  cholecalciferol 1000 Unit(s) Oral daily  galantamine ER 24 milliGRAM(s) Oral with breakfast  heparin  Injectable 5000 Unit(s) SubCutaneous every 8 hours  losartan 100 milliGRAM(s) Oral daily  memantine 5 milliGRAM(s) Oral two times a day  pantoprazole    Tablet 40 milliGRAM(s) Oral before breakfast    MEDICATIONS  (PRN):  acetaminophen   Tablet 650 milliGRAM(s) Oral every 6 hours PRN For Temp greater than 38 C (100.4 F)  ALBUTerol/ipratropium for Nebulization 3 milliLiter(s) Nebulizer every 6 hours PRN Shortness of Breath and/or Wheezing    Allergies    doxycycline (Unknown)    Intolerances    hydrochlorothiazide (Other)    SOCIAL HISTORY:    FAMILY HISTORY:    Vital Signs Last 24 Hrs  T(C): 36.7 (12 Feb 2018 16:36), Max: 37.1 (12 Feb 2018 15:46)  T(F): 98 (12 Feb 2018 16:36), Max: 98.8 (12 Feb 2018 15:46)  HR: 71 (12 Feb 2018 16:36) (59 - 75)  BP: 119/76 (12 Feb 2018 16:36) (106/69 - 148/78)  BP(mean): --  RR: 18 (12 Feb 2018 16:36) (16 - 20)  SpO2: 97% (12 Feb 2018 16:36) (95% - 99%)    PHYSICAL EXAM:  Constitutional: NAD, awake, alert  Respiratory: Unlabored breathing, no respiratory distress  Cardiovascular: RRR  Extremities: Mild B/L LE swelling, worse on right. Swelling is from the ankles to the midleg, no tenderness to palpation, motor function intact  Vascular: Palpable DP/Pop/Fem pulse, Triphasic PT doppler signals b/l      LABS:                        10.2   5.1   )-----------( 323      ( 12 Feb 2018 06:04 )             29.9     02-12    138  |  101  |  13  ----------------------------<  92  4.0   |  27  |  1.17    Ca    9.1      12 Feb 2018 06:04  Phos  3.4     02-11  Mg     2.1     02-12    RADIOLOGY & ADDITIONAL STUDIES    Assessment/Plan:  89 y/o active female (lives alone w/24h HHA) w/ PMHx of HTN, HLD, Dementia (A&Ox1), hx Cerebral Artery Occlusion, pAfib (no AC, pt. new onset ~1mo ago 2/2 to UTI/Pyelo in [place]; currently SR) admitted for aspiration pneumonia and hyponatremia now with B/L LE swelling    Obtain a b/l LE duplex US  Rest of plan as per primary team  Plan discussed with vascular chief on call

## 2018-02-12 NOTE — DISCHARGE NOTE ADULT - MEDICATION SUMMARY - MEDICATIONS TO TAKE
I will START or STAY ON the medications listed below when I get home from the hospital:    Outpatient Physical Therapy  -- Indication: For rehab    Aspirin Enteric Coated 81 mg oral delayed release tablet  -- 1 tab(s) by mouth once a day  -- Indication: For cvd    losartan 100 mg oral tablet  -- 1 tab(s) by mouth once a day  -- Indication: For HTN    atorvastatin 10 mg oral tablet  -- 1 tab(s) by mouth once a day  -- Indication: For HLD    galantamine 24 mg oral capsule, extended release  -- 1 cap(s) by mouth once a day (in the morning)  -- Indication: For Dementia    guaiFENesin 200 mg oral tablet  -- 1 tab(s) by mouth 2 times a day, As Needed -for congestion   -- Medication should be taken with plenty of water.    -- Indication: For congestion    Namenda 5 mg oral tablet  -- 1 tab(s) by mouth 2 times a day  -- Indication: For Dementia    Flonase 50 mcg/inh nasal spray  -- 1 spray(s) intranasally once a day, As Needed -for congestion   -- For the nose.  It is very important that you take or use this exactly as directed.  Do not skip doses or discontinue unless directed by your doctor.    -- Indication: For Allergies    pantoprazole 40 mg oral delayed release tablet  -- 1 tab(s) by mouth once a day (before a meal)  -- Indication: For gerd    Vitamin D3 1000 intl units oral tablet  -- 1 tab(s) by mouth once a day  -- Indication: For vit D

## 2018-02-12 NOTE — DISCHARGE NOTE ADULT - PATIENT PORTAL LINK FT
You can access the [facility] Patient Portal, offered by [facility], by registering with the following website: [email or web address]

## 2018-02-12 NOTE — CONSULT NOTE ADULT - CONSULT REQUESTED DATE/TIME
06-Feb-2018 17:52
07-Feb-2018 14:03
07-Feb-2018 23:27
08-Feb-2018
08-Feb-2018 16:30
12-Feb-2018 20:22
09-Feb-2018 15:36

## 2018-02-12 NOTE — PROGRESS NOTE ADULT - SUBJECTIVE AND OBJECTIVE BOX
CC: ALTERED MENTAL STATUS//HYPOANTREMIA   89 yo lady     91   INTERVAL HISTORY:this 89 yo lady with htn, recent hyponatremia, and altered mental status  has comorbidities as outllined, and has improved markedly in hospital, with  normalization of serum sodium and mental status.         ROS: No chest pain. No shortness of breath. No nausea.    PAST MEDICAL & SURGICAL HISTORY:  AS (aortic stenosis)  Atrial fibrillation: Pt. had new onset of Afib two weeks ago while hospitalized for UTI and Sinusitis.  No AC.  Pt. currently SR  Sinusitis  UTI (urinary tract infection): Pt. was recently hospitalized in [place] two weeks ago tx with ABx  Essential hypertension: HTN (hypertension)  Cerebral artery occlusion with cerebral infarction: CVA (cerebral infarction)  Hyperlipidemia: HLD (hyperlipidemia)  Memory loss: Memory loss  Fall: Falls  No significant past surgical history      PHYSICAL EXAM:  T(C): 36.6 (18 @ 21:04), Max: 37.1 (18 @ 15:46)  HR: 64 (18 @ 21:04)  BP: 119/64 (18 @ 21:04) (106/69 - 148/78)  RR: 17 (18 @ 21:04)  SpO2: 100% (18 @ 21:04)  Wt(kg): --  I&O's Summary    2018 07:01  -  2018 07:00  --------------------------------------------------------  IN: 100 mL / OUT: 0 mL / NET: 100 mL      Weight 57.1 ( @ 18:51)    Appearance: alert, pleasant, INAD.  ENT: oral mucosa moist, no pallor/cyanosis.  Neck: no JVD visible.  Cardiac: no rubs. no murmurs. Extremities: NO EDEMA.  Skin: no rashes.  Extremities (digits): no clubbing or cyanosis.  Respratory effort: no access muscle use. Lungs: CLEAR TO AUSCULTATION.  Abdomen: soft. nontender. no masses.  Psych affect: not depressed. Orientation: person, place, situation.     MEDICATIONS  (STANDING):  aspirin enteric coated 81 milliGRAM(s) Oral daily  atorvastatin 10 milliGRAM(s) Oral at bedtime  cholecalciferol 1000 Unit(s) Oral daily  galantamine ER 24 milliGRAM(s) Oral with breakfast  heparin  Injectable 5000 Unit(s) SubCutaneous every 8 hours  losartan 100 milliGRAM(s) Oral daily  memantine 5 milliGRAM(s) Oral two times a day  pantoprazole    Tablet 40 milliGRAM(s) Oral before breakfast    MEDICATIONS  (PRN):  acetaminophen   Tablet 650 milliGRAM(s) Oral every 6 hours PRN For Temp greater than 38 C (100.4 F)  ALBUTerol/ipratropium for Nebulization 3 milliLiter(s) Nebulizer every 6 hours PRN Shortness of Breath and/or Wheezing      DATA:  138    |  101    |  13     ----------------------------<  92     Ca:9.1   (2018 06:04)  4.0     |  27     |  1.17       eGFR if Non : 41 <L>  eGFR if : 47 <L>                            10.2<L>  5.1   )-----------( 323      ( 2018 06:04 )             29.9<L>        Urinalysis Basic - ( 2018 20:37 )  Color: Yellow / Appearance: Clear / S.010 / pH: x  Gluc: x / Ketone: NEGATIVE  / Bili: Negative / Urobili: 0.2 E.U./dL   Blood: x / Protein: NEGATIVE mg/dL / Nitrite: NEGATIVE   Leuk Esterase: NEGATIVE / RBC: x / WBC x   Sq Epi: x / Non Sq Epi: x / Bacteria: x

## 2018-02-12 NOTE — PROGRESS NOTE ADULT - ASSESSMENT
89 y/o active female (lives alone w/24h HHA) w/ PMHx of HTN, HLD, Dementia (A&Ox1), hx Cerebral Artery Occlusion, pAfib (no AC, pt. new onset ~1mo ago) BIBA to [facility] ED (2/6/2018)  after change in functional status / lethargy per family 2/2 hyponatremia and aspiration pneumonia. 89 y/o active female (lives alone w/24h HHA) w/ PMHx of HTN, HLD, Dementia (A&Ox1), hx Cerebral Artery Occlusion, pAfib (no AC, pt. new onset ~1mo ago) brought in by ambulance to [facility] ED (2/6/2018)  after change in functional status / lethargy per family 2/2 hyponatremia and aspiration pneumonia.

## 2018-02-12 NOTE — DISCHARGE NOTE ADULT - CARE PROVIDER_API CALL
Mary Ann Stahl), Internal Medicine  [address] [id number]  Phone: [phone]  Fax: [phone]    Rah Barahona), Internal Medicine  [address] [place] [address]  Phone: [phone]  Fax: [phone]    Roge Moya), Critical Care Medicine; Internal Medicine; Pulmonary Disease  [address] [place] [address]  Phone: [phone]  Fax: [phone]

## 2018-02-12 NOTE — PROGRESS NOTE ADULT - SUBJECTIVE AND OBJECTIVE BOX
I examined the patient today, reviewed the lab data, xrays and additional studies. Additionally I have reviewed the notes and assessments by the residents and consultants.   At this point I agree with the assessments and plan.  I would also advise close observation, and supportive care.Office followup all problems   family refused psych care

## 2018-02-12 NOTE — PROGRESS NOTE ADULT - PROBLEM SELECTOR PLAN 2
given cough and infiltrate, likely etiology Aspiration PNA (considering patient's AMS,lethargy and CT findings) vs less likely HAP  Recommendation  - pt has completed iv antibiotic course  -if cough remains productive ;consider sputum culture  -appreciate speech and swallow recommendations  -aspiration precautions advised with recommendation HOB elevation >30 degrees  -Oral hygiene  -RVP-ve  -will continue to follow.

## 2018-02-12 NOTE — PROGRESS NOTE ADULT - PROBLEM SELECTOR PLAN 2
-Dr Bernabe/Malik initially consulted. Patient’s family requesting Dr. Moya for pulmonary care.   Continued intermittent productive cough, denies SOB, no leukocytosis. RVP (2/7/2018): negative  - CXR on admission: right hilar infiltrate concerning for aspiration pneumonia.   - CT Chest 2/7: Right middle lobe and right lower lobe consolidation consistent with multifocal pneumonia, Small right pleural effusion.  14 mm groundglass opacity right upper lobe stable since 1/21/2016 (f/u CT 1yr).   Per Dr. Gan; clinically improved and lungs CTA.   -continue Zosyn for 5 days (day 5/5)  - Recent sinus infection, Paranasal X Ray (2/8/2018): No radiographic evidence of sinus inflammatory disease.    -Per family request as discussed with Dr. Stahl; standing 2L Nasal canula and chest PT TID ordered.

## 2018-02-13 VITALS
RESPIRATION RATE: 18 BRPM | DIASTOLIC BLOOD PRESSURE: 71 MMHG | TEMPERATURE: 98 F | SYSTOLIC BLOOD PRESSURE: 111 MMHG | OXYGEN SATURATION: 97 % | HEART RATE: 67 BPM

## 2018-02-13 LAB
ANION GAP SERPL CALC-SCNC: 6 MMOL/L — SIGNIFICANT CHANGE UP (ref 5–17)
APPEARANCE UR: CLEAR — SIGNIFICANT CHANGE UP
BILIRUB UR-MCNC: NEGATIVE — SIGNIFICANT CHANGE UP
BUN SERPL-MCNC: 12 MG/DL — SIGNIFICANT CHANGE UP (ref 7–23)
CALCIUM SERPL-MCNC: 9.3 MG/DL — SIGNIFICANT CHANGE UP (ref 8.4–10.5)
CHLORIDE SERPL-SCNC: 103 MMOL/L — SIGNIFICANT CHANGE UP (ref 96–108)
CO2 SERPL-SCNC: 30 MMOL/L — SIGNIFICANT CHANGE UP (ref 22–31)
COLOR SPEC: YELLOW — SIGNIFICANT CHANGE UP
CREAT SERPL-MCNC: 1.18 MG/DL — SIGNIFICANT CHANGE UP (ref 0.5–1.3)
DIFF PNL FLD: NEGATIVE — SIGNIFICANT CHANGE UP
GLUCOSE SERPL-MCNC: 94 MG/DL — SIGNIFICANT CHANGE UP (ref 70–99)
GLUCOSE UR QL: NEGATIVE — SIGNIFICANT CHANGE UP
HCT VFR BLD CALC: 28.9 % — LOW (ref 34.5–45)
HGB BLD-MCNC: 9.9 G/DL — LOW (ref 11.5–15.5)
KETONES UR-MCNC: NEGATIVE — SIGNIFICANT CHANGE UP
LEGIONELLA AG UR QL: NEGATIVE — SIGNIFICANT CHANGE UP
LEUKOCYTE ESTERASE UR-ACNC: NEGATIVE — SIGNIFICANT CHANGE UP
MAGNESIUM SERPL-MCNC: 2.1 MG/DL — SIGNIFICANT CHANGE UP (ref 1.6–2.6)
MCHC RBC-ENTMCNC: 30.1 PG — SIGNIFICANT CHANGE UP (ref 27–34)
MCHC RBC-ENTMCNC: 34.3 G/DL — SIGNIFICANT CHANGE UP (ref 32–36)
MCV RBC AUTO: 87.8 FL — SIGNIFICANT CHANGE UP (ref 80–100)
NITRITE UR-MCNC: NEGATIVE — SIGNIFICANT CHANGE UP
PH UR: 7 — SIGNIFICANT CHANGE UP (ref 5–8)
PLATELET # BLD AUTO: 335 K/UL — SIGNIFICANT CHANGE UP (ref 150–400)
POTASSIUM SERPL-MCNC: 3.9 MMOL/L — SIGNIFICANT CHANGE UP (ref 3.5–5.3)
POTASSIUM SERPL-SCNC: 3.9 MMOL/L — SIGNIFICANT CHANGE UP (ref 3.5–5.3)
PROT UR-MCNC: NEGATIVE MG/DL — SIGNIFICANT CHANGE UP
RBC # BLD: 3.29 M/UL — LOW (ref 3.8–5.2)
RBC # FLD: 14.2 % — SIGNIFICANT CHANGE UP (ref 10.3–16.9)
SODIUM SERPL-SCNC: 139 MMOL/L — SIGNIFICANT CHANGE UP (ref 135–145)
SP GR SPEC: 1.01 — SIGNIFICANT CHANGE UP (ref 1–1.03)
UROBILINOGEN FLD QL: 0.2 E.U./DL — SIGNIFICANT CHANGE UP
WBC # BLD: 6.1 K/UL — SIGNIFICANT CHANGE UP (ref 3.8–10.5)
WBC # FLD AUTO: 6.1 K/UL — SIGNIFICANT CHANGE UP (ref 3.8–10.5)

## 2018-02-13 PROCEDURE — 92610 EVALUATE SWALLOWING FUNCTION: CPT | Mod: GN

## 2018-02-13 PROCEDURE — 87486 CHLMYD PNEUM DNA AMP PROBE: CPT

## 2018-02-13 PROCEDURE — 70220 X-RAY EXAM OF SINUSES: CPT

## 2018-02-13 PROCEDURE — 93970 EXTREMITY STUDY: CPT

## 2018-02-13 PROCEDURE — 93970 EXTREMITY STUDY: CPT | Mod: 26

## 2018-02-13 PROCEDURE — 97161 PT EVAL LOW COMPLEX 20 MIN: CPT

## 2018-02-13 PROCEDURE — 87449 NOS EACH ORGANISM AG IA: CPT

## 2018-02-13 PROCEDURE — 93005 ELECTROCARDIOGRAM TRACING: CPT

## 2018-02-13 PROCEDURE — 80048 BASIC METABOLIC PNL TOTAL CA: CPT

## 2018-02-13 PROCEDURE — 70450 CT HEAD/BRAIN W/O DYE: CPT

## 2018-02-13 PROCEDURE — 87581 M.PNEUMON DNA AMP PROBE: CPT

## 2018-02-13 PROCEDURE — 99285 EMERGENCY DEPT VISIT HI MDM: CPT | Mod: 25

## 2018-02-13 PROCEDURE — 81003 URINALYSIS AUTO W/O SCOPE: CPT

## 2018-02-13 PROCEDURE — 97116 GAIT TRAINING THERAPY: CPT

## 2018-02-13 PROCEDURE — 85027 COMPLETE CBC AUTOMATED: CPT

## 2018-02-13 PROCEDURE — 83735 ASSAY OF MAGNESIUM: CPT

## 2018-02-13 PROCEDURE — 99232 SBSQ HOSP IP/OBS MODERATE 35: CPT

## 2018-02-13 PROCEDURE — 87798 DETECT AGENT NOS DNA AMP: CPT

## 2018-02-13 PROCEDURE — 36415 COLL VENOUS BLD VENIPUNCTURE: CPT

## 2018-02-13 PROCEDURE — 71045 X-RAY EXAM CHEST 1 VIEW: CPT

## 2018-02-13 PROCEDURE — 71250 CT THORAX DX C-: CPT

## 2018-02-13 PROCEDURE — 87633 RESP VIRUS 12-25 TARGETS: CPT

## 2018-02-13 PROCEDURE — 84100 ASSAY OF PHOSPHORUS: CPT

## 2018-02-13 RX ORDER — LOSARTAN POTASSIUM 100 MG/1
1 TABLET, FILM COATED ORAL
Qty: 0 | Refills: 0 | COMMUNITY
Start: 2018-02-13

## 2018-02-13 RX ORDER — POTASSIUM CHLORIDE 20 MEQ
20 PACKET (EA) ORAL ONCE
Qty: 0 | Refills: 0 | Status: COMPLETED | OUTPATIENT
Start: 2018-02-13 | End: 2018-02-13

## 2018-02-13 RX ORDER — LOSARTAN POTASSIUM 100 MG/1
1 TABLET, FILM COATED ORAL
Qty: 30 | Refills: 0 | OUTPATIENT
Start: 2018-02-13 | End: 2018-03-14

## 2018-02-13 RX ORDER — PANTOPRAZOLE SODIUM 20 MG/1
1 TABLET, DELAYED RELEASE ORAL
Qty: 0 | Refills: 0 | COMMUNITY
Start: 2018-02-13

## 2018-02-13 RX ADMIN — PANTOPRAZOLE SODIUM 40 MILLIGRAM(S): 20 TABLET, DELAYED RELEASE ORAL at 06:42

## 2018-02-13 RX ADMIN — HEPARIN SODIUM 5000 UNIT(S): 5000 INJECTION INTRAVENOUS; SUBCUTANEOUS at 06:42

## 2018-02-13 RX ADMIN — Medication 81 MILLIGRAM(S): at 12:30

## 2018-02-13 RX ADMIN — MEMANTINE HYDROCHLORIDE 5 MILLIGRAM(S): 10 TABLET ORAL at 06:42

## 2018-02-13 RX ADMIN — Medication 1000 UNIT(S): at 12:30

## 2018-02-13 RX ADMIN — Medication 20 MILLIEQUIVALENT(S): at 12:30

## 2018-02-13 RX ADMIN — LOSARTAN POTASSIUM 100 MILLIGRAM(S): 100 TABLET, FILM COATED ORAL at 06:42

## 2018-02-13 NOTE — PROGRESS NOTE ADULT - SUBJECTIVE AND OBJECTIVE BOX
Chief Complaint/Reason for Consult: eb  INTERVAL HPI: improvement in lethargy and volume status, s/p abx for PNA Na stabilized  	  MEDICATIONS:  losartan 100 milliGRAM(s) Oral daily      ALBUTerol/ipratropium for Nebulization 3 milliLiter(s) Nebulizer every 6 hours PRN    acetaminophen   Tablet 650 milliGRAM(s) Oral every 6 hours PRN  galantamine ER 24 milliGRAM(s) Oral with breakfast  memantine 5 milliGRAM(s) Oral two times a day    pantoprazole    Tablet 40 milliGRAM(s) Oral before breakfast    atorvastatin 10 milliGRAM(s) Oral at bedtime    aspirin enteric coated 81 milliGRAM(s) Oral daily  cholecalciferol 1000 Unit(s) Oral daily  heparin  Injectable 5000 Unit(s) SubCutaneous every 8 hours      REVIEW OF SYSTEMS:  [x] As per HPI  CONSTITUTIONAL: No fever, weight loss, or fatigue  RESPIRATORY: No cough, wheezing, chills or hemoptysis; No Shortness of Breath  CARDIOVASCULAR: No chest pain, palpitations, dizziness, or leg swelling  GASTROINTESTINAL: No abdominal or epigastric pain. No nausea, vomiting, or hematemesis; No diarrhea or constipation. No melena or hematochezia.  MUSCULOSKELETAL: No joint pain or swelling; No muscle, back, or extremity pain  [x] All others negative	  [ ] Unable to obtain    PHYSICAL EXAM:  T(C): 36.7 (02-13-18 @ 09:09), Max: 37.1 (02-12-18 @ 15:46)  HR: 60 (02-13-18 @ 09:09) (60 - 71)  BP: 135/75 (02-13-18 @ 09:09) (106/69 - 136/79)  RR: 17 (02-13-18 @ 09:09) (16 - 18)  SpO2: 95% (02-13-18 @ 09:09) (95% - 100%)  Wt(kg): --  I&O's Summary    13 Feb 2018 07:01  -  13 Feb 2018 14:48  --------------------------------------------------------  IN: 0 mL / OUT: 300 mL / NET: -300 mL          Appearance: Normal	  HEENT:   Normal oral mucosa  Cardiovascular: Normal S1 S2, No JVD, No murmurs, No edema  Respiratory: Lungs clear to auscultation	  Gastrointestinal:  Soft, Non-tender, + BS	  Extremities: Normal range of motion, No clubbing, cyanosis or edema  Vascular: Peripheral pulses palpable 2+ bilaterally    TELEMETRY: 	    ECG:   	  RADIOLOGY:   CXR:  CT:  US:    CARDIAC TESTING:  Echocardiogram:  Catheterization:  Stress Test:      LABS:	 	    CARDIAC MARKERS:                                  9.9    6.1   )-----------( 335      ( 13 Feb 2018 06:41 )             28.9     02-13    139  |  103  |  12  ----------------------------<  94  3.9   |  30  |  1.18    Ca    9.3      13 Feb 2018 06:40  Mg     2.1     02-13      proBNP:   Lipid Profile:   HgA1c:   TSH:     ASSESSMENT/PLAN: 	    # Eb - not primary cardiac liekly 2/2 infx and dehydration    #HTN - disrcharge off all anti htn meds for now and encourage aggressive PO hydration    #CV Prevention -   q3mo Fasting Lipid Profile, Goal LDL<100, statin as tolerated.  q6week TSH check  q3mo 25-OHD Vitamin D Level, Goal 50, supplement as tolerated      d/w daughter in detail

## 2018-02-13 NOTE — PROGRESS NOTE ADULT - SUBJECTIVE AND OBJECTIVE BOX
No acute events.       Vital Signs Last 24 Hrs  T(C): 36.7 (13 Feb 2018 09:09), Max: 37.1 (12 Feb 2018 15:46)  T(F): 98.1 (13 Feb 2018 09:09), Max: 98.8 (12 Feb 2018 15:46)  HR: 60 (13 Feb 2018 09:09) (60 - 71)  BP: 135/75 (13 Feb 2018 09:09) (106/69 - 136/79)  BP(mean): --  RR: 17 (13 Feb 2018 09:09) (16 - 18)  SpO2: 95% (13 Feb 2018 09:09) (95% - 100%)  I&O's Summary    13 Feb 2018 07:01  -  13 Feb 2018 13:25  --------------------------------------------------------  IN: 0 mL / OUT: 300 mL / NET: -300 mL        Physical Exam:  - NAD, alert, interactive,     Constitutional: NAD, awake, alert  Respiratory: Unlabored breathing, no respiratory distress  Cardiovascular: RRR  Extremities: Mild B/L LE swelling, worse on right. Swelling is from the ankles to the midleg, no tenderness to palpation, motor function intact  Vascular: Palpable DP/Pop/Fem pulse, Triphasic PT doppler signals b/l    LABS:                        9.9    6.1   )-----------( 335      ( 13 Feb 2018 06:41 )             28.9     02-13    139  |  103  |  12  ----------------------------<  94  3.9   |  30  |  1.18    Ca    9.3      13 Feb 2018 06:40  Mg     2.1     02-13          Radiology and Additional Studies: No acute events. Pt remains afebrile and HDS. She is without new complaints. Pt and daughter state that leg swelling is improved today. Denies pain.       Vital Signs Last 24 Hrs  T(C): 36.7 (13 Feb 2018 09:09), Max: 37.1 (12 Feb 2018 15:46)  T(F): 98.1 (13 Feb 2018 09:09), Max: 98.8 (12 Feb 2018 15:46)  HR: 60 (13 Feb 2018 09:09) (60 - 71)  BP: 135/75 (13 Feb 2018 09:09) (106/69 - 136/79)  BP(mean): --  RR: 17 (13 Feb 2018 09:09) (16 - 18)  SpO2: 95% (13 Feb 2018 09:09) (95% - 100%)  I&O's Summary    13 Feb 2018 07:01  -  13 Feb 2018 13:25  --------------------------------------------------------  IN: 0 mL / OUT: 300 mL / NET: -300 mL        Physical Exam:  - NAD, alert, interactive, comfortable, sitting in chair  - non-labored breathing  - no appreciable lower extremity swelling; 2+ bilateral DP, popliteal, femoral pulses; motor and sensation intact       LABS:                        9.9    6.1   )-----------( 335      ( 13 Feb 2018 06:41 )             28.9     02-13    139  |  103  |  12  ----------------------------<  94  3.9   |  30  |  1.18    Ca    9.3      13 Feb 2018 06:40  Mg     2.1     02-13      Radiology and Additional Studies:    VLE venous duplex: No deep vein thrombosis seen.

## 2018-02-13 NOTE — PROGRESS NOTE ADULT - SUBJECTIVE AND OBJECTIVE BOX
Neurology Follow up note    Name  SAVANAH ISAAC    HPI:  89 y/o active female (lives alone w/24h HHA) w/ PMHx of HTN, HLD, Dementia (A&Ox1), hx Cerebral Artery Occlusion, pAfib (no AC, pt. new onset ~1mo ago 2/2 to UTI/Pyelo in [place]; currently SR) presented to [facility] ED 1/16 after change in functional status / lethargy per family. Pt's son stated that upon arriving to her apt this AM to take her to breakfast, she was slumped over and lethargic. Normally she walks on her own, but she needed her son and daughter-in-law to help her down the stairs. The aid reported that she had a decreased PO intake over the past few days but no new symptoms or complaints aside from mild back pain since her last admission to [facility] for syncope work up. Denied fever, chills, recent illness, sick contacts, chest pain, SOB, HAYDEN, orthopnea, LE edema, n/v/d, abd pain, dysuria, hematuria. Pt herself denies any symptoms however A&Ox1. Of note, patient was recently hospitalized December 2017 in [place], [place] for UTI/Pyelo and sinusitis and treated with IV abx, and upon return to [place], had a syncopal episode 1/15 and was admitted to [facility] with a negative work up including CTA brain, Echo 1/16 revealing NL LV wall motion, LVEF NL 60-65%, No AS. Carotid US revealed mild-mod dz, TSH WNL, and UA repeated which was negative for UTI and urine culture negative. Pt was given a Holter Monitor otpt by Dr. Stahl which revealed no atrial fibrillation, but bradycardia to 40s. In the ED today VSS, afebrile, ECG NSR @ 61 BPM no acute STT changes, CT Head unchanged, labs sig for troponin negative x1, Na 126, K 3.8. Pt will be admitted to [facility] for further monitoring, EP consult, and work up for possible sick sinus syndrome. (06 Feb 2018 13:03)      Interval History - no headaches dizziness or weakness        REVIEW OF SYSTEMS    Vital Signs Last 24 Hrs  T(C): 36.6 (13 Feb 2018 05:10), Max: 37.1 (12 Feb 2018 15:46)  T(F): 97.9 (13 Feb 2018 05:10), Max: 98.8 (12 Feb 2018 15:46)  HR: 67 (13 Feb 2018 05:10) (64 - 75)  BP: 136/79 (13 Feb 2018 05:10) (106/69 - 136/79)  BP(mean): --  RR: 16 (13 Feb 2018 05:10) (16 - 18)  SpO2: 96% (13 Feb 2018 05:10) (95% - 100%)    Physical Exam-     Mental Status- awake and alert    Cranial Nerves- full EOM    Gait and station-no foot drop    Motor-no focal weakness    Reflexes-intact    Sensation-no sensory level    Coordination- no tremors    Vascular -no bruits    Medications  acetaminophen   Tablet 650 milliGRAM(s) Oral every 6 hours PRN  ALBUTerol/ipratropium for Nebulization 3 milliLiter(s) Nebulizer every 6 hours PRN  aspirin enteric coated 81 milliGRAM(s) Oral daily  atorvastatin 10 milliGRAM(s) Oral at bedtime  cholecalciferol 1000 Unit(s) Oral daily  galantamine ER 24 milliGRAM(s) Oral with breakfast  heparin  Injectable 5000 Unit(s) SubCutaneous every 8 hours  losartan 100 milliGRAM(s) Oral daily  memantine 5 milliGRAM(s) Oral two times a day  pantoprazole    Tablet 40 milliGRAM(s) Oral before breakfast  potassium chloride   Powder 20 milliEquivalent(s) Oral once      Lab      Radiology    Assessment- No acute neuro deficit     Plan as per Dr Barahona

## 2018-02-13 NOTE — PROGRESS NOTE ADULT - SUBJECTIVE AND OBJECTIVE BOX
INTERVAL HPI/OVERNIGHT EVENTS:    VITAL SIGNS:  T(F): 97.9 (18 @ 05:10)  HR: 67 (18 @ 05:10)  BP: 136/79 (18 @ 05:10)  RR: 16 (18 @ 05:10)  SpO2: 96% (18 @ 05:10)  Wt(kg): --    PHYSICAL EXAM:        MEDICATIONS  (STANDING):  aspirin enteric coated 81 milliGRAM(s) Oral daily  atorvastatin 10 milliGRAM(s) Oral at bedtime  cholecalciferol 1000 Unit(s) Oral daily  galantamine ER 24 milliGRAM(s) Oral with breakfast  heparin  Injectable 5000 Unit(s) SubCutaneous every 8 hours  losartan 100 milliGRAM(s) Oral daily  memantine 5 milliGRAM(s) Oral two times a day  pantoprazole    Tablet 40 milliGRAM(s) Oral before breakfast    MEDICATIONS  (PRN):  acetaminophen   Tablet 650 milliGRAM(s) Oral every 6 hours PRN For Temp greater than 38 C (100.4 F)  ALBUTerol/ipratropium for Nebulization 3 milliLiter(s) Nebulizer every 6 hours PRN Shortness of Breath and/or Wheezing      Allergies    doxycycline (Unknown)    Intolerances    hydrochlorothiazide (Other)      LABS:                        10.2   5.1   )-----------( 323      ( 2018 06:04 )             29.9     02-12    138  |  101  |  13  ----------------------------<  92  4.0   |  27  |  1.17    Ca    9.1      2018 06:04  Phos  3.4     02-11  Mg     2.1     02-12        Urinalysis Basic - ( 2018 20:37 )    Color: Yellow / Appearance: Clear / S.010 / pH: x  Gluc: x / Ketone: NEGATIVE  / Bili: Negative / Urobili: 0.2 E.U./dL   Blood: x / Protein: NEGATIVE mg/dL / Nitrite: NEGATIVE   Leuk Esterase: NEGATIVE / RBC: x / WBC x   Sq Epi: x / Non Sq Epi: x / Bacteria: x        RADIOLOGY & ADDITIONAL TESTS:

## 2018-02-13 NOTE — PROGRESS NOTE ADULT - ASSESSMENT
89 F female (lives alone w/24h HHA) w/ PMHx of HTN, HLD, Dementia (A&Ox1), hx Cerebral Artery Occlusion, pAfib (no AC, pt. new onset ~1mo ago 2/2 to UTI/Pyelo in [place]; currently SR) admitted for aspiration pneumonia and hyponatremia now with B/L LE swelling, which has improved. Venous duplex is negative for DVT.     - no surgical intervention indicated  - elevate legs while in bed  - remaining care per primary service  - please call [phone] with any questions or concerns

## 2018-02-13 NOTE — PROGRESS NOTE ADULT - SUBJECTIVE AND OBJECTIVE BOX
Pt seen and examined patient waiting for ultrasound, no new complaints, looks comfortable    REVIEW OF SYSTEMS:  Constitutional: No fever, weight loss or fatigue  Cardiovascular: No chest pain, palpitations, dizziness or leg swelling  Gastrointestinal: No abdominal or epigastric pain. No nausea, vomiting or hematemesis; No diarrhea or constipation. No melena or hematochezia.  Skin: No itching, burning, rashes or lesions       MEDICATIONS:  MEDICATIONS  (STANDING):  aspirin enteric coated 81 milliGRAM(s) Oral daily  atorvastatin 10 milliGRAM(s) Oral at bedtime  cholecalciferol 1000 Unit(s) Oral daily  galantamine ER 24 milliGRAM(s) Oral with breakfast  heparin  Injectable 5000 Unit(s) SubCutaneous every 8 hours  losartan 100 milliGRAM(s) Oral daily  memantine 5 milliGRAM(s) Oral two times a day  pantoprazole    Tablet 40 milliGRAM(s) Oral before breakfast  potassium chloride   Powder 20 milliEquivalent(s) Oral once    MEDICATIONS  (PRN):  acetaminophen   Tablet 650 milliGRAM(s) Oral every 6 hours PRN For Temp greater than 38 C (100.4 F)  ALBUTerol/ipratropium for Nebulization 3 milliLiter(s) Nebulizer every 6 hours PRN Shortness of Breath and/or Wheezing      Allergies    doxycycline (Unknown)    Intolerances    hydrochlorothiazide (Other)      Vital Signs Last 24 Hrs  T(C): 36.7 (2018 09:09), Max: 37.1 (2018 15:46)  T(F): 98.1 (2018 09:09), Max: 98.8 (2018 15:46)  HR: 60 (2018 09:09) (60 - 71)  BP: 135/75 (2018 09:09) (106/69 - 136/79)  BP(mean): --  RR: 17 (2018 09:09) (16 - 18)  SpO2: 95% (2018 09:09) (95% - 100%)     @ 07:01  -   @ 10:37  --------------------------------------------------------  IN: 0 mL / OUT: 300 mL / NET: -300 mL        PHYSICAL EXAM:    General: Well developed; well nourished; in no acute distress  HEENT: MMM, conjunctiva and sclera clear  Lungs: clear  Heart: regular, eb  Gastrointestinal: Soft non-tender non-distended; Normal bowel sounds; No hepatosplenomegaly  Skin: Warm and dry. No obvious rash    LABS:      CBC Full  -  ( 2018 06:41 )  WBC Count : 6.1 K/uL  Hemoglobin : 9.9 g/dL  Hematocrit : 28.9 %  Platelet Count - Automated : 335 K/uL  Mean Cell Volume : 87.8 fL  Mean Cell Hemoglobin : 30.1 pg  Mean Cell Hemoglobin Concentration : 34.3 g/dL  Auto Neutrophil # : x  Auto Lymphocyte # : x  Auto Monocyte # : x  Auto Eosinophil # : x  Auto Basophil # : x  Auto Neutrophil % : x  Auto Lymphocyte % : x  Auto Monocyte % : x  Auto Eosinophil % : x  Auto Basophil % : x    02-13    139  |  103  |  12  ----------------------------<  94  3.9   |  30  |  1.18    Ca    9.3      2018 06:40  Mg     2.1     02-13            Urinalysis Basic - ( 2018 08:53 )    Color: Yellow / Appearance: Clear / S.010 / pH: x  Gluc: x / Ketone: NEGATIVE  / Bili: Negative / Urobili: 0.2 E.U./dL   Blood: x / Protein: NEGATIVE mg/dL / Nitrite: NEGATIVE   Leuk Esterase: NEGATIVE / RBC: x / WBC x   Sq Epi: x / Non Sq Epi: x / Bacteria: x                RADIOLOGY & ADDITIONAL STUDIES (The following images were personally reviewed):

## 2018-02-14 NOTE — PROGRESS NOTE ADULT - PROVIDER SPECIALTY LIST ADULT
Cardiology
Electrophysiology
Internal Medicine
Intervent Cardiology
Nephrology
Neurology
Psychiatry
Pulmonology
Rehab Medicine
Vascular Surgery
Nephrology
Neurology

## 2018-02-14 NOTE — PROGRESS NOTE ADULT - SUBJECTIVE AND OBJECTIVE BOX
I examined the patient today, reviewed the lab data, xrays and additional studies. Additionally I have reviewed the notes and assessments by the residents and consultants.   At this point I agree with the assessments and plan.  I would also advise close observation, and supportive care. Advise psych and rehab

## 2018-02-16 DIAGNOSIS — J96.00 ACUTE RESPIRATORY FAILURE, UNSPECIFIED WHETHER WITH HYPOXIA OR HYPERCAPNIA: ICD-10-CM

## 2018-02-16 DIAGNOSIS — Z86.73 PERSONAL HISTORY OF TRANSIENT ISCHEMIC ATTACK (TIA), AND CEREBRAL INFARCTION WITHOUT RESIDUAL DEFICITS: ICD-10-CM

## 2018-02-16 DIAGNOSIS — R53.83 OTHER FATIGUE: ICD-10-CM

## 2018-02-16 DIAGNOSIS — I48.0 PAROXYSMAL ATRIAL FIBRILLATION: ICD-10-CM

## 2018-02-16 DIAGNOSIS — R00.1 BRADYCARDIA, UNSPECIFIED: ICD-10-CM

## 2018-02-16 DIAGNOSIS — K21.9 GASTRO-ESOPHAGEAL REFLUX DISEASE WITHOUT ESOPHAGITIS: ICD-10-CM

## 2018-02-16 DIAGNOSIS — J69.0 PNEUMONITIS DUE TO INHALATION OF FOOD AND VOMIT: ICD-10-CM

## 2018-02-16 DIAGNOSIS — Z91.81 HISTORY OF FALLING: ICD-10-CM

## 2018-02-16 DIAGNOSIS — E78.5 HYPERLIPIDEMIA, UNSPECIFIED: ICD-10-CM

## 2018-02-16 DIAGNOSIS — F03.90 UNSPECIFIED DEMENTIA WITHOUT BEHAVIORAL DISTURBANCE: ICD-10-CM

## 2018-02-16 DIAGNOSIS — I10 ESSENTIAL (PRIMARY) HYPERTENSION: ICD-10-CM

## 2018-02-16 DIAGNOSIS — E87.1 HYPO-OSMOLALITY AND HYPONATREMIA: ICD-10-CM

## 2018-02-16 DIAGNOSIS — R06.02 SHORTNESS OF BREATH: ICD-10-CM

## 2018-03-05 ENCOUNTER — APPOINTMENT (OUTPATIENT)
Dept: HEART AND VASCULAR | Facility: CLINIC | Age: 83
End: 2018-03-05
Payer: MEDICARE

## 2018-03-05 VITALS
HEIGHT: 62 IN | HEART RATE: 64 BPM | OXYGEN SATURATION: 96 % | BODY MASS INDEX: 24.11 KG/M2 | TEMPERATURE: 98 F | WEIGHT: 131 LBS | SYSTOLIC BLOOD PRESSURE: 150 MMHG | DIASTOLIC BLOOD PRESSURE: 80 MMHG

## 2018-03-05 PROCEDURE — 93000 ELECTROCARDIOGRAM COMPLETE: CPT

## 2018-03-05 PROCEDURE — 99214 OFFICE O/P EST MOD 30 MIN: CPT | Mod: 25

## 2018-03-06 ENCOUNTER — APPOINTMENT (OUTPATIENT)
Dept: NEPHROLOGY | Facility: CLINIC | Age: 83
End: 2018-03-06
Payer: MEDICARE

## 2018-03-06 VITALS — DIASTOLIC BLOOD PRESSURE: 59 MMHG | HEART RATE: 79 BPM | SYSTOLIC BLOOD PRESSURE: 108 MMHG

## 2018-03-06 VITALS — DIASTOLIC BLOOD PRESSURE: 68 MMHG | SYSTOLIC BLOOD PRESSURE: 118 MMHG

## 2018-03-06 VITALS — WEIGHT: 131 LBS | BODY MASS INDEX: 23.96 KG/M2

## 2018-03-06 PROCEDURE — 99214 OFFICE O/P EST MOD 30 MIN: CPT

## 2018-03-08 ENCOUNTER — APPOINTMENT (OUTPATIENT)
Dept: HEART AND VASCULAR | Facility: CLINIC | Age: 83
End: 2018-03-08

## 2018-03-08 ENCOUNTER — APPOINTMENT (OUTPATIENT)
Dept: HEART AND VASCULAR | Facility: CLINIC | Age: 83
End: 2018-03-08
Payer: MEDICARE

## 2018-03-08 PROCEDURE — 93306 TTE W/DOPPLER COMPLETE: CPT

## 2018-03-09 ENCOUNTER — LABORATORY RESULT (OUTPATIENT)
Age: 83
End: 2018-03-09

## 2018-03-09 ENCOUNTER — APPOINTMENT (OUTPATIENT)
Dept: UROGYNECOLOGY | Facility: CLINIC | Age: 83
End: 2018-03-09
Payer: MEDICARE

## 2018-03-09 VITALS
HEIGHT: 62 IN | WEIGHT: 131 LBS | SYSTOLIC BLOOD PRESSURE: 120 MMHG | DIASTOLIC BLOOD PRESSURE: 70 MMHG | BODY MASS INDEX: 24.11 KG/M2

## 2018-03-09 DIAGNOSIS — Z78.9 OTHER SPECIFIED HEALTH STATUS: ICD-10-CM

## 2018-03-09 DIAGNOSIS — Z86.39 PERSONAL HISTORY OF OTHER ENDOCRINE, NUTRITIONAL AND METABOLIC DISEASE: ICD-10-CM

## 2018-03-09 DIAGNOSIS — Z86.73 PERSONAL HISTORY OF TRANSIENT ISCHEMIC ATTACK (TIA), AND CEREBRAL INFARCTION W/OUT RESIDUAL DEFICITS: ICD-10-CM

## 2018-03-09 DIAGNOSIS — Z86.79 PERSONAL HISTORY OF OTHER DISEASES OF THE CIRCULATORY SYSTEM: ICD-10-CM

## 2018-03-09 DIAGNOSIS — Z82.49 FAMILY HISTORY OF ISCHEMIC HEART DISEASE AND OTHER DISEASES OF THE CIRCULATORY SYSTEM: ICD-10-CM

## 2018-03-09 PROCEDURE — 99204 OFFICE O/P NEW MOD 45 MIN: CPT | Mod: 25

## 2018-03-09 PROCEDURE — 51798 US URINE CAPACITY MEASURE: CPT

## 2018-03-20 ENCOUNTER — APPOINTMENT (OUTPATIENT)
Dept: PULMONOLOGY | Facility: CLINIC | Age: 83
End: 2018-03-20
Payer: MEDICARE

## 2018-03-20 VITALS
DIASTOLIC BLOOD PRESSURE: 70 MMHG | WEIGHT: 131 LBS | TEMPERATURE: 97.2 F | OXYGEN SATURATION: 94 % | HEART RATE: 64 BPM | BODY MASS INDEX: 24.11 KG/M2 | HEIGHT: 62 IN | SYSTOLIC BLOOD PRESSURE: 120 MMHG

## 2018-03-20 VITALS — OXYGEN SATURATION: 96 %

## 2018-03-20 PROCEDURE — 99215 OFFICE O/P EST HI 40 MIN: CPT | Mod: 25

## 2018-04-06 ENCOUNTER — APPOINTMENT (OUTPATIENT)
Dept: NEPHROLOGY | Facility: CLINIC | Age: 83
End: 2018-04-06
Payer: MEDICARE

## 2018-04-06 VITALS — DIASTOLIC BLOOD PRESSURE: 79 MMHG | SYSTOLIC BLOOD PRESSURE: 126 MMHG | HEART RATE: 70 BPM

## 2018-04-06 VITALS — WEIGHT: 134 LBS | BODY MASS INDEX: 24.51 KG/M2

## 2018-04-06 PROCEDURE — 99214 OFFICE O/P EST MOD 30 MIN: CPT

## 2018-04-06 RX ORDER — NITROFURANTOIN (MONOHYDRATE/MACROCRYSTALS) 25; 75 MG/1; MG/1
100 CAPSULE ORAL
Qty: 10 | Refills: 0 | Status: DISCONTINUED | COMMUNITY
Start: 2018-04-01

## 2018-04-06 RX ORDER — CEFUROXIME AXETIL 500 MG/1
500 TABLET ORAL
Qty: 14 | Refills: 0 | Status: DISCONTINUED | COMMUNITY
Start: 2018-03-02

## 2018-04-06 RX ORDER — FLUTICASONE PROPIONATE 50 UG/1
50 SPRAY, METERED NASAL
Qty: 16 | Refills: 0 | Status: DISCONTINUED | COMMUNITY
Start: 2018-01-05

## 2018-04-06 RX ORDER — ATORVASTATIN CALCIUM 10 MG/1
10 TABLET, FILM COATED ORAL
Qty: 90 | Refills: 0 | Status: DISCONTINUED | COMMUNITY
Start: 2017-10-23

## 2018-04-06 RX ORDER — MEMANTINE HYDROCHLORIDE 5 MG/1
5 TABLET, FILM COATED ORAL
Qty: 60 | Refills: 0 | Status: DISCONTINUED | COMMUNITY
Start: 2017-11-08

## 2018-04-06 RX ORDER — ASPIRIN ENTERIC COATED TABLETS 81 MG 81 MG/1
81 TABLET, DELAYED RELEASE ORAL
Qty: 30 | Refills: 0 | Status: DISCONTINUED | COMMUNITY
Start: 2018-03-16

## 2018-04-06 RX ORDER — CIPROFLOXACIN HYDROCHLORIDE 500 MG/1
500 TABLET, FILM COATED ORAL
Qty: 3 | Refills: 0 | Status: DISCONTINUED | COMMUNITY
Start: 2018-01-05

## 2018-04-06 RX ORDER — LOSARTAN POTASSIUM 100 MG/1
100 TABLET, FILM COATED ORAL
Qty: 30 | Refills: 0 | Status: DISCONTINUED | COMMUNITY
Start: 2018-02-13

## 2018-04-06 RX ORDER — CLARITHROMYCIN 500 MG/1
500 TABLET, FILM COATED ORAL
Qty: 20 | Refills: 0 | Status: DISCONTINUED | COMMUNITY
Start: 2018-02-15

## 2018-04-06 RX ORDER — LOSARTAN POTASSIUM AND HYDROCHLOROTHIAZIDE 25; 100 MG/1; MG/1
100-25 TABLET ORAL
Qty: 30 | Refills: 0 | Status: DISCONTINUED | COMMUNITY
Start: 2017-10-23

## 2018-04-06 RX ORDER — CEPHALEXIN 500 MG/1
500 CAPSULE ORAL
Qty: 14 | Refills: 0 | Status: DISCONTINUED | COMMUNITY
Start: 2017-11-12

## 2018-04-06 RX ORDER — GALANTAMINE 24 MG/1
24 CAPSULE, EXTENDED RELEASE ORAL
Qty: 30 | Refills: 0 | Status: DISCONTINUED | COMMUNITY
Start: 2017-10-09

## 2018-04-06 RX ORDER — AMOXICILLIN AND CLAVULANATE POTASSIUM 875; 125 MG/1; MG/1
875-125 TABLET, COATED ORAL
Qty: 20 | Refills: 0 | Status: DISCONTINUED | COMMUNITY
Start: 2018-01-01

## 2018-04-06 RX ORDER — GUAIFENESIN 200 MG/1
200 TABLET ORAL
Qty: 14 | Refills: 0 | Status: DISCONTINUED | COMMUNITY
Start: 2018-01-17

## 2018-05-03 ENCOUNTER — APPOINTMENT (OUTPATIENT)
Dept: NEPHROLOGY | Facility: CLINIC | Age: 83
End: 2018-05-03
Payer: MEDICARE

## 2018-05-03 ENCOUNTER — INPATIENT (INPATIENT)
Facility: HOSPITAL | Age: 83
LOS: 0 days | Discharge: HOME | DRG: 683 | End: 2018-05-04
Attending: INTERNAL MEDICINE | Admitting: INTERNAL MEDICINE
Payer: MEDICARE

## 2018-05-03 VITALS
RESPIRATION RATE: 18 BRPM | SYSTOLIC BLOOD PRESSURE: 98 MMHG | HEART RATE: 63 BPM | OXYGEN SATURATION: 99 % | DIASTOLIC BLOOD PRESSURE: 64 MMHG | TEMPERATURE: 97 F

## 2018-05-03 VITALS — SYSTOLIC BLOOD PRESSURE: 110 MMHG | DIASTOLIC BLOOD PRESSURE: 78 MMHG

## 2018-05-03 VITALS — SYSTOLIC BLOOD PRESSURE: 123 MMHG | HEART RATE: 67 BPM | DIASTOLIC BLOOD PRESSURE: 70 MMHG

## 2018-05-03 DIAGNOSIS — R63.8 OTHER SYMPTOMS AND SIGNS CONCERNING FOOD AND FLUID INTAKE: ICD-10-CM

## 2018-05-03 DIAGNOSIS — I10 ESSENTIAL (PRIMARY) HYPERTENSION: ICD-10-CM

## 2018-05-03 DIAGNOSIS — I48.91 UNSPECIFIED ATRIAL FIBRILLATION: ICD-10-CM

## 2018-05-03 DIAGNOSIS — R41.3 OTHER AMNESIA: ICD-10-CM

## 2018-05-03 DIAGNOSIS — I35.0 NONRHEUMATIC AORTIC (VALVE) STENOSIS: ICD-10-CM

## 2018-05-03 DIAGNOSIS — Z79.899 OTHER LONG TERM (CURRENT) DRUG THERAPY: ICD-10-CM

## 2018-05-03 DIAGNOSIS — E78.5 HYPERLIPIDEMIA, UNSPECIFIED: ICD-10-CM

## 2018-05-03 DIAGNOSIS — Z02.9 ENCOUNTER FOR ADMINISTRATIVE EXAMINATIONS, UNSPECIFIED: ICD-10-CM

## 2018-05-03 DIAGNOSIS — Z29.9 ENCOUNTER FOR PROPHYLACTIC MEASURES, UNSPECIFIED: ICD-10-CM

## 2018-05-03 DIAGNOSIS — I63.50 CEREBRAL INFARCTION DUE TO UNSPECIFIED OCCLUSION OR STENOSIS OF UNSPECIFIED CEREBRAL ARTERY: ICD-10-CM

## 2018-05-03 DIAGNOSIS — R55 SYNCOPE AND COLLAPSE: ICD-10-CM

## 2018-05-03 LAB
ALBUMIN SERPL ELPH-MCNC: 3.7 G/DL — SIGNIFICANT CHANGE UP (ref 3.3–5)
ALP SERPL-CCNC: 71 U/L — SIGNIFICANT CHANGE UP (ref 40–120)
ALT FLD-CCNC: 16 U/L — SIGNIFICANT CHANGE UP (ref 10–45)
ANION GAP SERPL CALC-SCNC: 13 MMOL/L — SIGNIFICANT CHANGE UP (ref 5–17)
APTT BLD: 20.5 SEC — LOW (ref 27.5–37.4)
AST SERPL-CCNC: 23 U/L — SIGNIFICANT CHANGE UP (ref 10–40)
BASOPHILS NFR BLD AUTO: 0.8 % — SIGNIFICANT CHANGE UP (ref 0–2)
BILIRUB SERPL-MCNC: 0.2 MG/DL — SIGNIFICANT CHANGE UP (ref 0.2–1.2)
BUN SERPL-MCNC: 30 MG/DL — HIGH (ref 7–23)
CALCIUM SERPL-MCNC: 9.2 MG/DL — SIGNIFICANT CHANGE UP (ref 8.4–10.5)
CHLORIDE SERPL-SCNC: 96 MMOL/L — SIGNIFICANT CHANGE UP (ref 96–108)
CK MB CFR SERPL CALC: 1.6 NG/ML — SIGNIFICANT CHANGE UP (ref 0–6.7)
CK SERPL-CCNC: 87 U/L — SIGNIFICANT CHANGE UP (ref 25–170)
CO2 SERPL-SCNC: 26 MMOL/L — SIGNIFICANT CHANGE UP (ref 22–31)
CREAT SERPL-MCNC: 1.37 MG/DL — HIGH (ref 0.5–1.3)
EOSINOPHIL NFR BLD AUTO: 3.5 % — SIGNIFICANT CHANGE UP (ref 0–6)
GLUCOSE SERPL-MCNC: 162 MG/DL — HIGH (ref 70–99)
HCT VFR BLD CALC: 33.2 % — LOW (ref 34.5–45)
HGB BLD-MCNC: 10.9 G/DL — LOW (ref 11.5–15.5)
INR BLD: 1.07 — SIGNIFICANT CHANGE UP (ref 0.88–1.16)
LYMPHOCYTES # BLD AUTO: 26.9 % — SIGNIFICANT CHANGE UP (ref 13–44)
MCHC RBC-ENTMCNC: 29.5 PG — SIGNIFICANT CHANGE UP (ref 27–34)
MCHC RBC-ENTMCNC: 32.8 G/DL — SIGNIFICANT CHANGE UP (ref 32–36)
MCV RBC AUTO: 89.7 FL — SIGNIFICANT CHANGE UP (ref 80–100)
MONOCYTES NFR BLD AUTO: 8.5 % — SIGNIFICANT CHANGE UP (ref 2–14)
NEUTROPHILS NFR BLD AUTO: 60.3 % — SIGNIFICANT CHANGE UP (ref 43–77)
PLATELET # BLD AUTO: 235 K/UL — SIGNIFICANT CHANGE UP (ref 150–400)
POTASSIUM SERPL-MCNC: 4.1 MMOL/L — SIGNIFICANT CHANGE UP (ref 3.5–5.3)
POTASSIUM SERPL-SCNC: 4.1 MMOL/L — SIGNIFICANT CHANGE UP (ref 3.5–5.3)
PROT SERPL-MCNC: 6.9 G/DL — SIGNIFICANT CHANGE UP (ref 6–8.3)
PROTHROM AB SERPL-ACNC: 11.9 SEC — SIGNIFICANT CHANGE UP (ref 9.8–12.7)
RBC # BLD: 3.7 M/UL — LOW (ref 3.8–5.2)
RBC # FLD: 14.7 % — SIGNIFICANT CHANGE UP (ref 10.3–16.9)
SODIUM SERPL-SCNC: 135 MMOL/L — SIGNIFICANT CHANGE UP (ref 135–145)
TROPONIN T SERPL-MCNC: <0.01 NG/ML — SIGNIFICANT CHANGE UP (ref 0–0.01)
WBC # BLD: 5.2 K/UL — SIGNIFICANT CHANGE UP (ref 3.8–10.5)
WBC # FLD AUTO: 5.2 K/UL — SIGNIFICANT CHANGE UP (ref 3.8–10.5)

## 2018-05-03 PROCEDURE — 99291 CRITICAL CARE FIRST HOUR: CPT

## 2018-05-03 PROCEDURE — 36415 COLL VENOUS BLD VENIPUNCTURE: CPT

## 2018-05-03 PROCEDURE — 93000 ELECTROCARDIOGRAM COMPLETE: CPT

## 2018-05-03 PROCEDURE — 99223 1ST HOSP IP/OBS HIGH 75: CPT | Mod: AI

## 2018-05-03 PROCEDURE — 99215 OFFICE O/P EST HI 40 MIN: CPT | Mod: 25

## 2018-05-03 PROCEDURE — 93010 ELECTROCARDIOGRAM REPORT: CPT

## 2018-05-03 RX ORDER — PANTOPRAZOLE SODIUM 20 MG/1
40 TABLET, DELAYED RELEASE ORAL
Qty: 0 | Refills: 0 | Status: DISCONTINUED | OUTPATIENT
Start: 2018-05-03 | End: 2018-05-04

## 2018-05-03 RX ORDER — ATORVASTATIN CALCIUM 80 MG/1
10 TABLET, FILM COATED ORAL AT BEDTIME
Qty: 0 | Refills: 0 | Status: DISCONTINUED | OUTPATIENT
Start: 2018-05-03 | End: 2018-05-04

## 2018-05-03 RX ORDER — GALANTAMINE HYDROBROMIDE 4 MG/1
1 TABLET, FILM COATED ORAL
Qty: 0 | Refills: 0 | COMMUNITY

## 2018-05-03 RX ORDER — ASPIRIN/CALCIUM CARB/MAGNESIUM 324 MG
81 TABLET ORAL DAILY
Qty: 0 | Refills: 0 | Status: DISCONTINUED | OUTPATIENT
Start: 2018-05-03 | End: 2018-05-04

## 2018-05-03 RX ORDER — MEMANTINE HYDROCHLORIDE 10 MG/1
5 TABLET ORAL
Qty: 0 | Refills: 0 | Status: DISCONTINUED | OUTPATIENT
Start: 2018-05-03 | End: 2018-05-04

## 2018-05-03 RX ORDER — SODIUM CHLORIDE 9 MG/ML
1000 INJECTION INTRAMUSCULAR; INTRAVENOUS; SUBCUTANEOUS
Qty: 0 | Refills: 0 | Status: DISCONTINUED | OUTPATIENT
Start: 2018-05-03 | End: 2018-05-04

## 2018-05-03 RX ORDER — CHOLECALCIFEROL (VITAMIN D3) 125 MCG
1 CAPSULE ORAL
Qty: 0 | Refills: 0 | COMMUNITY

## 2018-05-03 RX ORDER — CHOLECALCIFEROL (VITAMIN D3) 125 MCG
1000 CAPSULE ORAL DAILY
Qty: 0 | Refills: 0 | Status: DISCONTINUED | OUTPATIENT
Start: 2018-05-03 | End: 2018-05-04

## 2018-05-03 RX ORDER — FLUTICASONE PROPIONATE 50 MCG
1 SPRAY, SUSPENSION NASAL DAILY
Qty: 0 | Refills: 0 | Status: DISCONTINUED | OUTPATIENT
Start: 2018-05-03 | End: 2018-05-04

## 2018-05-03 RX ORDER — SODIUM CHLORIDE 9 MG/ML
500 INJECTION INTRAMUSCULAR; INTRAVENOUS; SUBCUTANEOUS ONCE
Qty: 0 | Refills: 0 | Status: COMPLETED | OUTPATIENT
Start: 2018-05-03 | End: 2018-05-03

## 2018-05-03 RX ORDER — MEMANTINE HYDROCHLORIDE 10 MG/1
1 TABLET ORAL
Qty: 0 | Refills: 0 | COMMUNITY

## 2018-05-03 RX ADMIN — SODIUM CHLORIDE 500 MILLILITER(S): 9 INJECTION INTRAMUSCULAR; INTRAVENOUS; SUBCUTANEOUS at 23:24

## 2018-05-03 RX ADMIN — SODIUM CHLORIDE 125 MILLILITER(S): 9 INJECTION INTRAMUSCULAR; INTRAVENOUS; SUBCUTANEOUS at 22:06

## 2018-05-03 NOTE — H&P ADULT - PROBLEM SELECTOR PLAN 3
Patient with paroxysmal atrial fibrillation diagnosed during episode of sepsis, currently in NSR with EKG from outpatient provider showing NSR as well. Recent Holter monitor without any atrial fibrillation. Not currently on any rate or rhythm control or AC.   - c/w Tele monitoring  - No AC given fall risk at this time  - f/u repeat EKG

## 2018-05-03 NOTE — H&P ADULT - PROBLEM SELECTOR PLAN 4
Patient with normocytic anemia with Hgb of 10.9, baseline appears to be around 10.0. Last colonoscopy roughly 10 years ago without any abnormalities, no reported melena or hematochezia, no other signs or symptoms of active bleeding, possible element of CKD, likely MOE vs AICD.   - Repeat CBC in AM  - Iron studies in AM

## 2018-05-03 NOTE — ED PROVIDER NOTE - MEDICAL DECISION MAKING DETAILS
syncope - hx of AS-  well appearing NAD HR 56  no STT changes syncope - hx of AS-  well appearing NAD HR 56  no STT changes will likely need PPM per dr yee

## 2018-05-03 NOTE — H&P ADULT - NSHPLABSRESULTS_GEN_ALL_CORE
.  LABS:                         10.9   5.2   )-----------( 235      ( 03 May 2018 22:00 )             33.2     05-03    135  |  96  |  30<H>  ----------------------------<  162<H>  4.1   |  26  |  1.37<H>    Ca    9.2      03 May 2018 22:00    TPro  6.9  /  Alb  3.7  /  TBili  0.2  /  DBili  x   /  AST  23  /  ALT  16  /  AlkPhos  71  05-03    PT/INR - ( 03 May 2018 22:00 )   PT: 11.9 sec;   INR: 1.07          PTT - ( 03 May 2018 22:00 )  PTT:20.5 sec    CARDIAC MARKERS ( 03 May 2018 22:00 )  x     / <0.01 ng/mL / 87 U/L / x     / 1.6 ng/mL            RADIOLOGY, EKG & ADDITIONAL TESTS: Reviewed.

## 2018-05-03 NOTE — H&P ADULT - PROBLEM SELECTOR PLAN 7
Patient with dementia, A&Ox1 at baseline  - c/w home Namenda 5mg BID  - Patient on Galantamine at home as well, not on formulary

## 2018-05-03 NOTE — H&P ADULT - HISTORY OF PRESENT ILLNESS
Patient is a 89yo F with a PMHx of HTN, HLD, Dementia (A&Ox1 at baseline), Cerebral Artery Occlusion, paroxysmal A Fib not on AC, reported AS (though no evidence on January ECHO) who presented to the [facility] ED after a pre-syncopal event while out to dinner with her HHA. Per family and HHA, patient is active at baseline and had been in her usual state of health today when after her appointment with Dr. Conklin she went to the park. Patient was out in the heat and sun for a few hours prior to going out to dinner with HHA where they sat outside. After the meal, patient stood up and began to feel lightheaded. HHA grabbed patient while bystanders placed her in a chair. HHA denies that patient lost conciseness or fell, no urinary or fecal incontinence. Patient does not recall episode though this is baseline per family. No reported recent fevers, chills, cough, SOB, CP, palpitations, n/v/d, some recent constipation, mild lower extremity edema which is chronic. Of note, patient was recently started on Torsemide as an outpatient for lower extremity edema and the dosage of her BP medication has been decreased due to relative hypotensive. Patient is a 89yo F with a PMHx of HTN, HLD, Dementia (A&Ox1 at baseline), Cerebral Artery Occlusion, paroxysmal A Fib not on AC, reported AS (though no evidence on January ECHO) who presented to the [facility] ED after a pre-syncopal event while out to dinner with her HHA. Per family and HHA, patient is active at baseline and had been in her usual state of health today when after her appointment with Dr. Conklin she went to the park. Patient was out in the heat and sun for a few hours prior to going out to dinner with HHA where they sat outside. After the meal, patient stood up and began to feel lightheaded. HHA grabbed patient while bystanders placed her in a chair. HHA denies that patient lost conciseness or fell, no urinary or fecal incontinence. Patient does not recall episode though this is baseline per family. No reported recent fevers, chills, cough, SOB, CP, palpitations, n/v/d, some recent constipation, mild lower extremity edema which is chronic. Of note, patient was recently started on Torsemide as an outpatient for lower extremity edema and the dosage of her BP medication has been decreased due to relative hypotension.    In the ED, VS T 97.4, HR 63, BP 98/64->125/65, R 18, SpO2 99% on room air. Orthostatics laying to sitting negative, no standing BP performed. Labs showing normocytic anemia with Hbg 10.9 (baseline 10.0), normal coags, BUN 30, Cr 1.37, glucose 162, negative cardiac enzymes. Patient given a 500cc NS bolus and was starting on standing fluids at 125cc/hr. Patient admitted to Cardiology for monitoring. Patient is a 89yo F with a PMHx of HTN, HLD, Dementia (A&Ox1 at baseline), Cerebral Artery Occlusion, paroxysmal A Fib not on AC, reported AS (though no evidence on January ECHO) who was BIBEMS to the [facility] ED after a pre-syncopal event while out to dinner with her HHA. Per family and HHA, patient is active at baseline and had been in her usual state of health today when after her appointment with Dr. Conklin she went to the park. Patient was out in the heat and sun for a few hours prior to going out to dinner with HHA where they sat outside. After the meal, patient stood up and began to feel lightheaded. HHA grabbed patient while bystanders placed her in a chair. HHA denies that patient lost conciseness or fell, no urinary or fecal incontinence. Patient does not recall episode though this is baseline per family. Bystanders called EMS. No reported recent fevers, chills, cough, SOB, CP, palpitations, n/v/d, some recent constipation, mild lower extremity edema which is chronic. Of note, patient was recently started on Torsemide as an outpatient for lower extremity edema and the dosage of her BP medication has been decreased due to relative hypotension.    In the ED, VS T 97.4, HR 63, BP 98/64->125/65, R 18, SpO2 99% on room air. Orthostatics laying to sitting negative, no standing BP performed. Labs showing normocytic anemia with Hbg 10.9 (baseline 10.0), normal coags, BUN 30, Cr 1.37, glucose 162, negative cardiac enzymes. Patient given a 500cc NS bolus and was starting on standing fluids at 125cc/hr. Patient admitted to Cardiology for monitoring.

## 2018-05-03 NOTE — ED PROVIDER NOTE - OBJECTIVE STATEMENT
89 yo female with hx of AS afib HTN with brief syncopal event while walking out of the restaurant - no head trauma- no antecedant cp or sob  no N/V or diarrhea -pt now feeling much better  no headache or neck pain  has 24 hr HHA - she is living in hotel with 89 yo female with hx of AS afib HTN with brief syncopal event while walking out of the restaurant - was out in the hot sun earlier in the day - recent afib dx - is on diuretic at present no head trauma- no antecedent cp or sob  no N/V or diarrhea -pt now feeling much better  no headache or neck pain  has 24 hr HHA where she is living in a hotel.

## 2018-05-03 NOTE — H&P ADULT - PROBLEM SELECTOR PLAN 5
Patient with history of HTN on Losartan 50mg PO QD, Torsemide 5mg PO 3x/week as outpatient, arrived hypotensive and now normotensive after fluid resuscitation.  - Holding Losartan and Torsemide at this time given likely prerenal SARA and hypotension  - Add back as tolerated

## 2018-05-03 NOTE — H&P ADULT - NSHPPHYSICALEXAM_GEN_ALL_CORE
.  VITAL SIGNS:  T(C): 36.6 (05-03-18 @ 23:45), Max: 36.6 (05-03-18 @ 23:45)  T(F): 97.8 (05-03-18 @ 23:45), Max: 97.8 (05-03-18 @ 23:45)  HR: 62 (05-03-18 @ 23:45) (55 - 63)  BP: 125/65 (05-03-18 @ 23:45) (98/64 - 125/65)  BP(mean): 82 (05-03-18 @ 23:45) (82 - 82)  RR: 28 (05-03-18 @ 23:45) (18 - 28)  SpO2: 99% (05-03-18 @ 23:45) (99% - 100%)  Wt(kg): --    PHYSICAL EXAM:    Constitutional: WDWN resting comfortably in bed; NAD  Head: NC/AT  Eyes: PERRL, EOMI, anicteric sclera  ENT: no nasal discharge; uvula midline, no oropharyngeal erythema or exudates; MMM  Neck: supple; no JVD or thyromegaly  Respiratory: CTA B/L; no W/R/R, no retractions  Cardiac: +S1/S2; RRR; no M/R/G; PMI non-displaced  Gastrointestinal: mild TTP in the suprapubic region, soft, ND, +BSx4  Back: spine midline, no bony tenderness or step-offs; no CVAT B/L  Extremities: 2+ pitting edema of b/l LE  Musculoskeletal: NROM x4; no joint swelling, tenderness or erythema  Vascular: 2+ radial, femoral, DP/PT pulses B/L  Dermatologic: skin warm, dry and intact; no rashes, wounds, or scars  Lymphatic: no submandibular or cervical LAD  Neurologic: AAOx1; CNII-XII grossly intact; no focal deficits

## 2018-05-03 NOTE — ED PROVIDER NOTE - CRITICAL CARE PROVIDED
direct patient care (not related to procedure)/additional history taking/documentation/interpretation of diagnostic studies

## 2018-05-03 NOTE — H&P ADULT - PROBLEM SELECTOR PLAN 8
DASH/TLC diet after dysphagia screening  NS at 100cc/hr with frequent lung checks  Replete electrolytes PRN to K>4, Mg>2

## 2018-05-03 NOTE — H&P ADULT - PROBLEM SELECTOR PLAN 2
Patient with Cr of 1.33 on admission, baseline appears to be 0.90. Patient out in sun and heat for many hours with initiation of Torsemide recently as outpatient.   - Hold Torsemide  - c/w NS at 100cc/hr  - Trend Cr on AM BMP  - Obtain urine electrolytes to evaluate likely prerenal etiology  - Discuss with Dr. Conklin

## 2018-05-03 NOTE — H&P ADULT - ATTENDING COMMENTS
Assessment: Patient personally seen and examined myself during rounds with the Physician Assistant/House Staff/Nurse Practitioner   on 5/3/18  Physician Assistant/House Staff/Nurse Practitioner note read, including vitals, physical findings, laboratory data, and radiological reports.   Revisions included below.   Direct personal management at bed side and extensive interpretation of the data.    Plan was outlined and discussed in details with the Physician Assistant/House Staff/Nurse Practitioner.    Decision making of high complexity   Risk high of complications, morbidity, and/or mortality  Assessment and Action taken for acute disease activity to reflect the level of care provided:  -Hemodynamic evaluation and support - bradycardic with syncopal episodes  -ACS assessment and treatment as applicable  -Heart failure assessment and treatment as applicable  -Cardiac Telemetry reviewed - bradycardic no pauses  -Medication reconciliation - hold losartan and continue diuresis  -Review laboratory data - possible prerenal  -EKG reviewed - no stemi  -Echo reviewed   -Interdisciplinary discussion with IC / EP / HF / CTS teams as needed  TIME SPENT in evaluation and management, reassessments, review and interpretation of labs and x-rays, and hemodynamic management, formulating a plan and coordinating care: ___70____ MIN.  Time does not include procedural time.

## 2018-05-03 NOTE — ED ADULT TRIAGE NOTE - CHIEF COMPLAINT QUOTE
Pt alert to self. Per family this is baseline for patient. pt was at restaurant with care giver and slumped over in her chair. per caregiver pt closed her eyes for a second. denies head trauma. F/S 194 in EMS. pmh tia, prediabetic, dementia. Pt alert to self. Per family this is baseline for patient. pt was at restaurant with care giver and slumped over in her chair. per caregiver pt closed her eyes for a second. denies head trauma. F/S 194 in EMS. pt currently c/o "slight" abd pain. denies dizziness, weakness, cp, sob. pmh tia, prediabetic, dementia.

## 2018-05-03 NOTE — ED ADULT NURSE NOTE - OBJECTIVE STATEMENT
pt in Er s/p fall, knelling on her knees. As per HHA pt with negative LOC, or injury. pt was in a restaurant having dinner when she tried to get up almost fell by she was held by HHA and she kneeled non her knee.

## 2018-05-03 NOTE — H&P ADULT - PROBLEM SELECTOR PLAN 1
Elderly female with pre-syncopal event witnessed by HHA after long day of exposure to sunlight and heat, recently started on new diuretic therapy with downward titration of BP meds given relative hypotension recently in outpatient setting. No fall, no LOC, no head trauma, no loss of bowel or bladder continence. EKG from outpatient visit earlier today NSR, recent Holter monitor without any atrial fibrillation though some episodes of bradycardia, currently being evaluated as outpatient for sick sinus syndrome, had been referred to EP. Most recent ECHO in January without any structural abnormalities. Patient anemic on admission but appears at baseline, etiology of presyncopal event likely hypovolemic vs neurocardiogenic given resolution of symptoms and return to normal BP with fluid resuscitation.  - c/w NS at 100cc/hr with frequent lung checks  - c/w telemetry monitoring at this time  - f/u repeat EKG  - Echocardiogram in AM to evaluate for changes  - Holding home Losartan 50 given hypo/normotension  - Holding home Torsemide 5 given SARA and hypo/normotension  - Management of anemia as below Elderly female with pre-syncopal event witnessed by HHA after long day of exposure to sunlight and heat, recently started on new diuretic therapy with downward titration of BP meds given relative hypotension recently in outpatient setting. No fall, no LOC, no head trauma, no loss of bowel or bladder continence.  per EMS. EKG from outpatient visit earlier today NSR, recent Holter monitor without any atrial fibrillation though some episodes of bradycardia, currently being evaluated as outpatient for sick sinus syndrome, had been referred to EP. Most recent ECHO in January without any structural abnormalities. HbA1c 5.3 in January, TSH normal at that time as well. Patient anemic on admission but appears at baseline, etiology of presyncopal event likely hypovolemic vs neurocardiogenic given resolution of symptoms and return to normal BP with fluid resuscitation.  - c/w NS at 100cc/hr with frequent lung checks  - c/w telemetry monitoring at this time  - f/u repeat EKG  - Echocardiogram in AM to evaluate for changes  - Holding home Losartan 50 given hypo/normotension  - Holding home Torsemide 5 given SARA and hypo/normotension  - Management of anemia as below

## 2018-05-04 ENCOUNTER — TRANSCRIPTION ENCOUNTER (OUTPATIENT)
Age: 83
End: 2018-05-04

## 2018-05-04 VITALS — TEMPERATURE: 98 F

## 2018-05-04 DIAGNOSIS — D64.9 ANEMIA, UNSPECIFIED: ICD-10-CM

## 2018-05-04 DIAGNOSIS — N17.9 ACUTE KIDNEY FAILURE, UNSPECIFIED: ICD-10-CM

## 2018-05-04 LAB
ANION GAP SERPL CALC-SCNC: 11 MMOL/L — SIGNIFICANT CHANGE UP (ref 5–17)
APPEARANCE UR: CLEAR — SIGNIFICANT CHANGE UP
APTT BLD: 27.1 SEC — LOW (ref 27.5–37.4)
BACTERIA # UR AUTO: PRESENT /HPF
BILIRUB UR-MCNC: NEGATIVE — SIGNIFICANT CHANGE UP
BLD GP AB SCN SERPL QL: NEGATIVE — SIGNIFICANT CHANGE UP
BUN SERPL-MCNC: 22 MG/DL — SIGNIFICANT CHANGE UP (ref 7–23)
CALCIUM SERPL-MCNC: 8.4 MG/DL — SIGNIFICANT CHANGE UP (ref 8.4–10.5)
CHLORIDE SERPL-SCNC: 103 MMOL/L — SIGNIFICANT CHANGE UP (ref 96–108)
CK MB CFR SERPL CALC: 1.8 NG/ML — SIGNIFICANT CHANGE UP (ref 0–6.7)
CK SERPL-CCNC: 80 U/L — SIGNIFICANT CHANGE UP (ref 25–170)
CO2 SERPL-SCNC: 25 MMOL/L — SIGNIFICANT CHANGE UP (ref 22–31)
COLOR SPEC: YELLOW — SIGNIFICANT CHANGE UP
CREAT ?TM UR-MCNC: 35 MG/DL — SIGNIFICANT CHANGE UP
CREAT SERPL-MCNC: 1.07 MG/DL — SIGNIFICANT CHANGE UP (ref 0.5–1.3)
DIFF PNL FLD: NEGATIVE — SIGNIFICANT CHANGE UP
EPI CELLS # UR: (no result) /HPF (ref 0–5)
FERRITIN SERPL-MCNC: 62 NG/ML — SIGNIFICANT CHANGE UP (ref 15–150)
GLUCOSE BLDC GLUCOMTR-MCNC: 140 MG/DL — HIGH (ref 70–99)
GLUCOSE SERPL-MCNC: 99 MG/DL — SIGNIFICANT CHANGE UP (ref 70–99)
GLUCOSE UR QL: NEGATIVE — SIGNIFICANT CHANGE UP
HCT VFR BLD CALC: 31.1 % — LOW (ref 34.5–45)
HGB BLD-MCNC: 10.2 G/DL — LOW (ref 11.5–15.5)
INR BLD: 1.05 — SIGNIFICANT CHANGE UP (ref 0.88–1.16)
IRON SATN MFR SERPL: 14 % — SIGNIFICANT CHANGE UP (ref 14–50)
IRON SATN MFR SERPL: 37 UG/DL — SIGNIFICANT CHANGE UP (ref 30–160)
KETONES UR-MCNC: NEGATIVE — SIGNIFICANT CHANGE UP
LEUKOCYTE ESTERASE UR-ACNC: (no result)
MAGNESIUM SERPL-MCNC: 2 MG/DL — SIGNIFICANT CHANGE UP (ref 1.6–2.6)
MCHC RBC-ENTMCNC: 30 PG — SIGNIFICANT CHANGE UP (ref 27–34)
MCHC RBC-ENTMCNC: 32.8 G/DL — SIGNIFICANT CHANGE UP (ref 32–36)
MCV RBC AUTO: 91.5 FL — SIGNIFICANT CHANGE UP (ref 80–100)
NITRITE UR-MCNC: NEGATIVE — SIGNIFICANT CHANGE UP
PH UR: 6 — SIGNIFICANT CHANGE UP (ref 5–8)
PLATELET # BLD AUTO: 216 K/UL — SIGNIFICANT CHANGE UP (ref 150–400)
POTASSIUM SERPL-MCNC: 3.8 MMOL/L — SIGNIFICANT CHANGE UP (ref 3.5–5.3)
POTASSIUM SERPL-SCNC: 3.8 MMOL/L — SIGNIFICANT CHANGE UP (ref 3.5–5.3)
PROT UR-MCNC: NEGATIVE MG/DL — SIGNIFICANT CHANGE UP
PROTHROM AB SERPL-ACNC: 11.7 SEC — SIGNIFICANT CHANGE UP (ref 9.8–12.7)
RBC # BLD: 3.4 M/UL — LOW (ref 3.8–5.2)
RBC # FLD: 14.3 % — SIGNIFICANT CHANGE UP (ref 10.3–16.9)
RBC CASTS # UR COMP ASSIST: < 5 /HPF — SIGNIFICANT CHANGE UP
RH IG SCN BLD-IMP: POSITIVE — SIGNIFICANT CHANGE UP
SODIUM SERPL-SCNC: 139 MMOL/L — SIGNIFICANT CHANGE UP (ref 135–145)
SODIUM UR-SCNC: 37 MMOL/L — SIGNIFICANT CHANGE UP
SP GR SPEC: <=1.005 — SIGNIFICANT CHANGE UP (ref 1–1.03)
TIBC SERPL-MCNC: 258 UG/DL — SIGNIFICANT CHANGE UP (ref 220–430)
TROPONIN T SERPL-MCNC: <0.01 NG/ML — SIGNIFICANT CHANGE UP (ref 0–0.01)
UIBC SERPL-MCNC: 221 UG/DL — SIGNIFICANT CHANGE UP (ref 110–370)
UROBILINOGEN FLD QL: 0.2 E.U./DL — SIGNIFICANT CHANGE UP
UUN UR-MCNC: 308 MG/DL — SIGNIFICANT CHANGE UP
WBC # BLD: 5.5 K/UL — SIGNIFICANT CHANGE UP (ref 3.8–10.5)
WBC # FLD AUTO: 5.5 K/UL — SIGNIFICANT CHANGE UP (ref 3.8–10.5)
WBC UR QL: (no result) /HPF

## 2018-05-04 PROCEDURE — 85610 PROTHROMBIN TIME: CPT

## 2018-05-04 PROCEDURE — 82962 GLUCOSE BLOOD TEST: CPT

## 2018-05-04 PROCEDURE — 82553 CREATINE MB FRACTION: CPT

## 2018-05-04 PROCEDURE — 83735 ASSAY OF MAGNESIUM: CPT

## 2018-05-04 PROCEDURE — 36415 COLL VENOUS BLD VENIPUNCTURE: CPT

## 2018-05-04 PROCEDURE — 82550 ASSAY OF CK (CPK): CPT

## 2018-05-04 PROCEDURE — G0378: CPT

## 2018-05-04 PROCEDURE — 84484 ASSAY OF TROPONIN QUANT: CPT

## 2018-05-04 PROCEDURE — 80048 BASIC METABOLIC PNL TOTAL CA: CPT

## 2018-05-04 PROCEDURE — 86901 BLOOD TYPING SEROLOGIC RH(D): CPT

## 2018-05-04 PROCEDURE — 85025 COMPLETE CBC W/AUTO DIFF WBC: CPT

## 2018-05-04 PROCEDURE — 93306 TTE W/DOPPLER COMPLETE: CPT | Mod: 26

## 2018-05-04 PROCEDURE — 84540 ASSAY OF URINE/UREA-N: CPT

## 2018-05-04 PROCEDURE — 84300 ASSAY OF URINE SODIUM: CPT

## 2018-05-04 PROCEDURE — 93005 ELECTROCARDIOGRAM TRACING: CPT

## 2018-05-04 PROCEDURE — 81001 URINALYSIS AUTO W/SCOPE: CPT

## 2018-05-04 PROCEDURE — 83550 IRON BINDING TEST: CPT

## 2018-05-04 PROCEDURE — 99238 HOSP IP/OBS DSCHRG MGMT 30/<: CPT

## 2018-05-04 PROCEDURE — 82728 ASSAY OF FERRITIN: CPT

## 2018-05-04 PROCEDURE — 82570 ASSAY OF URINE CREATININE: CPT

## 2018-05-04 PROCEDURE — 87086 URINE CULTURE/COLONY COUNT: CPT

## 2018-05-04 PROCEDURE — 93306 TTE W/DOPPLER COMPLETE: CPT

## 2018-05-04 PROCEDURE — 86900 BLOOD TYPING SEROLOGIC ABO: CPT

## 2018-05-04 PROCEDURE — 86850 RBC ANTIBODY SCREEN: CPT

## 2018-05-04 PROCEDURE — 85027 COMPLETE CBC AUTOMATED: CPT

## 2018-05-04 PROCEDURE — 99285 EMERGENCY DEPT VISIT HI MDM: CPT | Mod: 25

## 2018-05-04 PROCEDURE — 80053 COMPREHEN METABOLIC PANEL: CPT

## 2018-05-04 PROCEDURE — 85730 THROMBOPLASTIN TIME PARTIAL: CPT

## 2018-05-04 PROCEDURE — 99223 1ST HOSP IP/OBS HIGH 75: CPT

## 2018-05-04 RX ORDER — SODIUM CHLORIDE 9 MG/ML
1000 INJECTION INTRAMUSCULAR; INTRAVENOUS; SUBCUTANEOUS
Qty: 0 | Refills: 0 | Status: DISCONTINUED | OUTPATIENT
Start: 2018-05-04 | End: 2018-05-04

## 2018-05-04 RX ORDER — POLYETHYLENE GLYCOL 3350 17 G/17G
17 POWDER, FOR SOLUTION ORAL DAILY
Qty: 0 | Refills: 0 | Status: DISCONTINUED | OUTPATIENT
Start: 2018-05-04 | End: 2018-05-04

## 2018-05-04 RX ORDER — SIMETHICONE 80 MG/1
80 TABLET, CHEWABLE ORAL DAILY
Qty: 0 | Refills: 0 | Status: DISCONTINUED | OUTPATIENT
Start: 2018-05-04 | End: 2018-05-04

## 2018-05-04 RX ORDER — SIMETHICONE 80 MG/1
1 TABLET, CHEWABLE ORAL
Qty: 0 | Refills: 0 | COMMUNITY
Start: 2018-05-04

## 2018-05-04 RX ADMIN — MEMANTINE HYDROCHLORIDE 5 MILLIGRAM(S): 10 TABLET ORAL at 06:19

## 2018-05-04 RX ADMIN — Medication 81 MILLIGRAM(S): at 09:24

## 2018-05-04 RX ADMIN — POLYETHYLENE GLYCOL 3350 17 GRAM(S): 17 POWDER, FOR SOLUTION ORAL at 09:24

## 2018-05-04 RX ADMIN — Medication 1000 UNIT(S): at 09:24

## 2018-05-04 RX ADMIN — SODIUM CHLORIDE 50 MILLILITER(S): 9 INJECTION INTRAMUSCULAR; INTRAVENOUS; SUBCUTANEOUS at 13:15

## 2018-05-04 RX ADMIN — SODIUM CHLORIDE 100 MILLILITER(S): 9 INJECTION INTRAMUSCULAR; INTRAVENOUS; SUBCUTANEOUS at 01:01

## 2018-05-04 RX ADMIN — PANTOPRAZOLE SODIUM 40 MILLIGRAM(S): 20 TABLET, DELAYED RELEASE ORAL at 06:19

## 2018-05-04 NOTE — DISCHARGE NOTE ADULT - CARE PROVIDERS DIRECT ADDRESSES
[email or web address]

## 2018-05-04 NOTE — DISCHARGE NOTE ADULT - PATIENT PORTAL LINK FT
You can access the [facility] Patient Portal, offered by [facility], by registering with the following website: [email or web address]

## 2018-05-04 NOTE — DISCHARGE NOTE ADULT - CARE PROVIDER_API CALL
Rakesh Conklin), Internal Medicine; Nephrology  [address] [address]  Phone: [phone]  Fax: [phone]    Mary Ann Stahl), Internal Medicine  [address] [id number]  Phone: [phone]  Fax: [phone]    Roge Moya), Critical Care Medicine; Internal Medicine; Pulmonary Disease  [address] [address]  Phone: [phone]  Fax: [phone]

## 2018-05-04 NOTE — DISCHARGE NOTE ADULT - PLAN OF CARE
To prevent further episodes of dizziness and continued follow up with your cardiologist for outpatient work up and management You presented with symptoms of light headedness and dizziness, you were given fluids and admitted to [facility] for further management. Your home torsemide and anti hypertensives were held upon admission. Overnight your symptoms of light headedness and dizziness resolved. Your symptoms were likely in the setting of dehydration.   Please stop taking torsemide and your blood pressure medications upon discharge and follow up with Dr. Oneal for continued outpatient follow up and management. You presented with symptoms of light headedness and dizziness, you were given fluids and admitted to [facility] for further management. Your home torsemide and anti hypertensives were held upon admission. Overnight your symptoms of light headedness and dizziness resolved. Your symptoms were likely in the setting of dehydration.   Please stop taking torsemide and your blood pressure medications upon discharge and follow up with Dr. Oneal for continued outpatient follow up and management. You have a follow up appointment arranged on 05/10 at 1:30 pm.  During your hospitalization, EP was consulted and as per their expert recommendations, the episode of dizziness was likely secondary to dehydration, no indications for a pacemaker at this time. You had an elevated creatinine upon admission likely secondary to dehydration which resolved after your home torsemide was held and you were given fluids. Please continue to follow up with Dr. Conklin for continued management. Please continue to take aspirin daily.   Please continue to take lipitor daily. We held your blood pressure medications upon admission as your blood pressure on admission was low and you presented with symptoms of dizziness. Upon discharge, please continue to not take your home blood pressure medications until you follow up with Dr. Stahl and Dr. Conklin as an outpatient. Please continue to take lipitor daily. Please continue to take your home medications. To prevent further episodes of dizziness and continue follow up with your cardiologist for outpatient work up and management You presented with symptoms of light headedness and dizziness, you were given fluids and admitted to [facility] for further management. Your home torsemide and anti hypertensives were held upon admission. Overnight your symptoms of light headedness and dizziness resolved. Your symptoms were likely in the setting of dehydration.   Please stop taking torsemide and your blood pressure medications upon discharge and follow up with Dr. Stahl for continued outpatient follow up and management. You have a follow up appointment arranged on 05/10 at 1:30 pm.  During your hospitalization, EP was consulted and as per their expert recommendations, the episode of dizziness was likely secondary to dehydration, no indications for a pacemaker at this time. You presented with symptoms of light headedness and dizziness, you were given fluids and admitted to [facility] for further management. Your home torsemide and anti hypertensives were held upon admission. Overnight your symptoms of light headedness and dizziness resolved. Your symptoms were likely in the setting of dehydration.   Please continue taking torsemide as prescribed 5 mg every other day.  Please stop taking your losartan to avoid decrease in your blood pressures and follow up with Dr. Stahl for continued outpatient follow up and management. You have a follow up appointment arranged on 05/10 at 1:30 pm.  During your hospitalization, EP was consulted and as per their expert recommendations, the episode of dizziness was likely secondary to dehydration, no indications for a pacemaker at this time. We held your blood pressure medications upon admission as your blood pressure on admission was low and you presented with symptoms of dizziness. Upon discharge, please continue to take your home torsemide as prescribed. Please stop your losartan and follow up with Dr. Stahl and Dr. Adams after discharge for continued management.

## 2018-05-04 NOTE — CONSULT NOTE ADULT - SUBJECTIVE AND OBJECTIVE BOX
Patient is a 89y old  Female who presents with a chief complaint of Syncope/Presyncope (03 May 2018 23:25)        HPI:  Patient is a 89yo F with a PMHx of HTN, HLD, Dementia (A&Ox1 at baseline), Cerebral Artery Occlusion, paroxysmal A Fib not on AC, reported AS (though no evidence on January ECHO) who was BIBEMS to the [facility] ED after a pre-syncopal event while out to dinner with her HHA. Per family and HHA, patient is active at baseline and had been in her usual state of health today when after her appointment with Dr. Conklin she went to the park. Patient was out in the heat and sun for a few hours prior to going out to dinner with HHA where they sat outside. After the meal, patient stood up and began to feel lightheaded. HHA grabbed patient while bystanders placed her in a chair. HHA denies that patient lost conciseness or fell, no urinary or fecal incontinence. Patient does not recall episode though this is baseline per family. Bystanders called EMS. No reported recent fevers, chills, cough, SOB, CP, palpitations, n/v/d, some recent constipation, mild lower extremity edema which is chronic. Of note, patient was recently started on Torsemide as an outpatient for lower extremity edema and the dosage of her BP medication has been decreased due to relative hypotension.    In the ED, VS T 97.4, HR 63, BP 98/64->125/65, R 18, SpO2 99% on room air. Orthostatics laying to sitting negative, no standing BP performed. Labs showing normocytic anemia with Hbg 10.9 (baseline 10.0), normal coags, BUN 30, Cr 1.37, glucose 162, negative cardiac enzymes. Patient given a 500cc NS bolus and was starting on standing fluids at 125cc/hr. Patient admitted to Cardiology for monitoring. (03 May 2018 23:25)     Poor historian - no reports of headaches- admitted with probable syncope      Allergies  doxycycline (Unknown)  hydrochlorothiazide (Other)      Health Issues  SYNCOPE; BRADYCARDIA  No h/o HF  No pertinent family history in first degree relatives  Handoff  MEWS Score  AS (aortic stenosis)  Atrial fibrillation  Sinusitis  UTI (urinary tract infection)  Essential hypertension  Cerebral artery occlusion with cerebral infarction  Hyperlipidemia  Memory loss  Fall  Syncope  Anemia  SARA (acute kidney injury)  Discharge planning issues  Need for prophylactic measure  Nutrition, metabolism, and development symptoms  Memory loss  Hyperlipidemia  Essential hypertension  Atrial fibrillation  Cerebral artery occlusion with cerebral infarction  AS (aortic stenosis)  Syncope  No significant past surgical history  NEAR SYNCOPE  79  Bradycardia        FAMILY HISTORY:  No pertinent family history in first degree relatives      MEDICATIONS  (STANDING):  aspirin enteric coated 81 milliGRAM(s) Oral daily  atorvastatin 10 milliGRAM(s) Oral at bedtime  cholecalciferol 1000 Unit(s) Oral daily  memantine 5 milliGRAM(s) Oral two times a day  pantoprazole    Tablet 40 milliGRAM(s) Oral before breakfast  sodium chloride 0.9%. 1000 milliLiter(s) (100 mL/Hr) IV Continuous <Continuous>    MEDICATIONS  (PRN):  fluticasone propionate 50 MICROgram(s)/spray Nasal Spray 1 Spray(s) Both Nostrils daily PRN Congestion  polyethylene glycol 3350 17 Gram(s) Oral daily PRN Constipation      PAST MEDICAL & SURGICAL HISTORY:  AS (aortic stenosis)  Atrial fibrillation: Pt. had new onset of Afib two weeks ago while hospitalized for UTI and Sinusitis.  No AC.  Pt. currently SR  Sinusitis  UTI (urinary tract infection): Pt. was recently hospitalized in [place] two weeks ago tx with ABx  Essential hypertension: HTN (hypertension)  Cerebral artery occlusion with cerebral infarction: CVA (cerebral infarction)  Hyperlipidemia: HLD (hyperlipidemia)  Memory loss: Memory loss  Fall: Falls  No significant past surgical history      Labs                          10.2   5.5   )-----------( 216      ( 04 May 2018 06:07 )             31.1     05-04    139  |  103  |  22  ----------------------------<  99  3.8   |  25  |  1.07    Ca    8.4      04 May 2018 06:06  Mg     2.0     05-04    TPro  6.9  /  Alb  3.7  /  TBili  0.2  /  DBili  x   /  AST  23  /  ALT  16  /  AlkPhos  71  05-03      Radiology:    Physical Exam    MENTAL STATUS  -Level of Consciousness- awake    Orientation- person  Language- aphasia/ dysarthria  Memory- recent and remote- poor      Cranial Nerve 1- 12  Pupils- equal and reactive  Eye movements-full  Facial - no asymmetry   Lower CN-nl    Gait and Station-n/a    MOTOR  Upper- no drift  Lower-no foot drop    Reflexes- decreased    Sensation- no sensory level    Cerebellar- no tremors    vascular -no bruits    Assessment- Syncope, MCI    Plan Ct head, EEG, TSH, B12

## 2018-05-04 NOTE — PROGRESS NOTE ADULT - PROBLEM SELECTOR PLAN 5
-Patient with history of HTN on Losartan 50mg PO QD, Torsemide 5mg PO 3x/week as outpatient, arrived hypotensive and now normotensive after fluid resuscitation.  -Holding Losartan and Torsemide at this time given likely SARA and hypotension

## 2018-05-04 NOTE — DISCHARGE NOTE ADULT - HOSPITAL COURSE
Patient is a 89yo F with a PMHx of HTN, HLD, Dementia (A&Ox1 at baseline), Cerebral Artery Occlusion, paroxysmal A Fib not on AC, reported AS (though no evidence on January ECHO) who was BIBEMS to the [facility] ED after a pre-syncopal event while out to dinner with her HHA. Per family and HHA, patient is active at baseline and had been in her usual state of health today when after her appointment with Dr. Conklin she went to the park. Patient was out in the heat and sun for a few hours prior to going out to dinner with HHA where they sat outside. After the meal, patient stood up and began to feel lightheaded. HHA grabbed patient while bystanders placed her in a chair. HHA denies that patient lost conciseness or fell, no urinary or fecal incontinence. Patient does not recall episode though this is baseline per family. Bystanders called EMS. No reported recent fevers, chills, cough, SOB, CP, palpitations, n/v/d, some recent constipation, mild lower extremity edema which is chronic. Of note, patient was recently started on Torsemide as an outpatient for lower extremity edema and the dosage of her BP medication has been decreased due to relative hypotension.    In the ED, VS T 97.4, HR 63, BP 98/64->125/65, R 18, SpO2 99% on room air. Orthostatics laying to sitting negative, no standing BP performed. Labs showing normocytic anemia with Hbg 10.9 (baseline 10.0), normal coags, BUN 30, Cr 1.37, glucose 162, negative cardiac enzymes. Patient given a 500cc NS bolus and was starting on standing fluids at 125cc/hr. Patient admitted to Cardiology for monitoring.  Overnight, patient hemodynamically stable. Syncope likely in the setting of dehydration. Patient hemodynamically stable for discharge with continued outpatient follow up with Dr. Stahl. Patient is a 89yo F with a PMHx of HTN, HLD, Dementia (A&Ox1 at baseline), Cerebral Artery Occlusion, paroxysmal A Fib not on AC, reported AS (though no evidence on January ECHO) who was BIBEMS to [facility] ED after a pre-syncopal event while out to dinner with her HHA. Per family and HHA, patient is active at baseline and had been in her usual state of health when after her appointment with Dr. Conklin she went to the park. Patient was out in the heat and sun for a few hours prior to going out to dinner with HHA where they sat outside. After the meal, patient stood up and began to feel lightheaded. HHA grabbed patient while bystanders placed her in a chair. HHA denies that patient lost conciseness or fell, no urinary or fecal incontinence. Patient does not recall episode though this is baseline per family. Bystanders called EMS. No reported recent fevers, chills, cough, SOB, CP, palpitations, n/v/d, some recent constipation, mild lower extremity edema which is chronic. Of note, patient was recently started on Torsemide as an outpatient for lower extremity edema and the dosage of her BP medication has been decreased due to relative hypotension.    In the ED, VS T 97.4, HR 63, BP 98/64->125/65, R 18, SpO2 99% on room air. Orthostatics laying to sitting negative, no standing BP performed. Labs showing normocytic anemia with Hbg 10.9 (baseline 10.0), normal coags, BUN 30, Cr 1.37, glucose 162, negative cardiac enzymes. Patient given a 500cc NS bolus and was starting on standing fluids at 125cc/hr. Patient admitted to Cardiology for monitoring.  Overnight, patient hemodynamically stable. Syncope likely in the setting of dehydration. Patient hemodynamically stable for discharge with continued outpatient follow up with Dr. Stahl. Patient is a 89 yo F with a PMHx of HTN, HLD, Dementia (A&Ox1 at baseline), Cerebral Artery Occlusion, paroxysmal A Fib not on AC who was BIBEMS to [facility] ED after a pre-syncopal event while out to dinner with her HHA. Per family and HHA, patient is active at baseline and had been in her usual state of health when after her appointment with Dr. Conklin, she went to the park. Patient was out in the heat and sun for a few hours prior to going out to dinner with HHA where they sat outside. After the meal, patient stood up and began to feel lightheaded. HHA grabbed patient while bystanders placed her in a chair. HHA denies that patient lost conciseness or fell, no urinary or fecal incontinence. Patient does not recall episode though this is baseline per family. Bystanders called EMS. No reported recent fevers, chills, cough, SOB, CP, palpitations, n/v/d, some recent constipation, mild lower extremity edema which is chronic. Of note, patient was recently started on Torsemide as an outpatient for lower extremity edema and the dosage of her BP medication has been decreased due to relative hypotension.  In the ED, VS T 97.4, HR 63, BP 98/64->125/65, R 18, SpO2 99% on room air. Orthostatics laying to sitting negative, no standing BP performed. Labs showing normocytic anemia with Hbg 10.9 (baseline 10.0), normal coags, BUN 30, Cr 1.37, glucose 162, negative cardiac enzymes. Patient given a 500cc NS bolus and was starting on standing fluids at 125cc/hr. Patient admitted to Cardiology for monitoring.  Overnight, patient hemodynamically stable. Syncope likely in the setting of dehydration as symptoms resolved after fluids. EP consulted, no indication for a pacemaker at this time, will continue outpatient follow up. Patient hemodynamically stable for discharge with continued outpatient follow up with Dr. Stahl. Patient is a 89 yo F with a PMHx of HTN, HLD, Dementia (A&Ox1 at baseline), Cerebral Artery Occlusion, paroxysmal A Fib not on AC who was BIBEMS to [facility] ED after a pre-syncopal event while out to dinner with her HHA. Per family and HHA, patient is active at baseline and had been in her usual state of health when after her appointment with Dr. Conklin, she went to the park. Patient was out in the heat and sun for a few hours prior to going out to dinner with HHA where they sat outside. After the meal, patient stood up and began to feel lightheaded. HHA grabbed patient while bystanders placed her in a chair. HHA denies that patient lost conciseness or fell, no urinary or fecal incontinence. Patient does not recall episode though this is baseline per family. Bystanders called EMS. No reported recent fevers, chills, cough, SOB, CP, palpitations, n/v/d, some recent constipation, mild lower extremity edema which is chronic. Of note, patient was recently started on Torsemide as an outpatient for lower extremity edema and the dosage of her BP medication has been decreased due to relative hypotension.  In the ED, VS T 97.4, HR 63, BP 98/64->125/65, R 18, SpO2 99% on room air. Orthostatics laying to sitting negative, no standing BP performed. Labs showing normocytic anemia with Hbg 10.9 (baseline 10.0), normal coags, BUN 30, Cr 1.37, glucose 162, negative cardiac enzymes. Patient given a 500cc NS bolus and was starting on standing fluids at 125cc/hr. Patient admitted to Cardiology for monitoring.  Overnight, patient hemodynamically stable. Syncope likely in the setting of dehydration as symptoms resolved after fluids. EP consulted, symptoms likely 2/2 dehydration, no indication for a pacemaker at this time, will continue outpatient follow up. Patient hemodynamically stable for discharge with continued outpatient follow up with Dr. Stahl.   As per Dr. Stahl and Dr. Conklin's recs, will continue home torsemide 5 mg every other day (home dose) and hold losartan upon discharge.

## 2018-05-04 NOTE — PROGRESS NOTE ADULT - PROBLEM SELECTOR PLAN 4
-Patient with normocytic anemia with Hgb of 10, baseline appears to be around 10.0. Last colonoscopy roughly 10 years ago without any abnormalities, no reported melena or hematochezia, no other signs or symptoms of active bleeding, possible element of CKD, likely MOE vs AICD.   -Continue to trend Hgb

## 2018-05-04 NOTE — PROGRESS NOTE ADULT - PROBLEM SELECTOR PLAN 3
-Patient with paroxysmal atrial fibrillation diagnosed during episode of sepsis, currently in NSR with EKG from outpatient provider showing NSR as well. Recent Holter monitor without any atrial fibrillation. Not currently on any rate or rhythm control or AC.   -c/w Tele monitoring  -No AC given fall risk at this time

## 2018-05-04 NOTE — CONSULT NOTE ADULT - SUBJECTIVE AND OBJECTIVE BOX
CHIEF COMPLAINT: near syncope    HISTORY OF PRESENT ILLNESS: 89 yo F with history of HTN, HLD, dementia, paroxysmal atrial fibrillation not on anticoagulation, s/p CVA , AS by history, was admitted to [facility] for evaluation of near syncopal episode. Patient is known to EPS she was evaluated on 2/7/18 for bradycardia. Patient is AxO x 1 (to self only) and unable to provide any history.   As per chart :   Per family and HHA, patient is active at baseline and had been in her usual state of health today when after her appointment with Dr. Conklin she went to the [place]. Patient was out in the heat and sun for a few hours prior to going out to dinner with HHA where they sat outside. After the meal, patient stood up and began to feel lightheaded. HHA grabbed patient while bystanders placed her in a chair. HHA denies that patient lost conciseness or fell, no urinary or fecal incontinence.   Over night telemetry didn't show any arrhythmias.       PAST MEDICAL & SURGICAL HISTORY:  AS (aortic stenosis)  Atrial fibrillation: Pt. had new onset of Afib two weeks ago while hospitalized for UTI and Sinusitis.  No AC.  Pt. currently SR  Sinusitis  UTI (urinary tract infection): Pt. was recently hospitalized in [place] two weeks ago tx with ABx  Essential hypertension: HTN (hypertension)  Cerebral artery occlusion with cerebral infarction: CVA (cerebral infarction)  Hyperlipidemia: HLD (hyperlipidemia)  Memory loss: Memory loss  Fall: Falls  No significant past surgical history      PERTINENT DIAGNOSTIC TESTING:    [ ] Echocardiogram: < from: Echocardiogram (01.16.18 @ 13:44) >  Normal left ventricular size and wall thickness.The left ventricular wall   motion is normal.The left ventricular ejection fraction is estimated to   be   60-65%The left atrial size is normal.Right atrial size is normal.The   right   ventricle is mildly dilated.Structurally normal mitral valve.No mitral   regurgitation noted.Structurally normal tricuspid valve.There is no   echocardiographic evidence for pulmonary hypertension.Structurally normal   pulmonic valve.There is trace pulmonic regurgitation.No aortic root   dilatation.The inferior vena cava is normal in size (<2.1 cm) with normal   inspiratory collapse (>50%) consistent with normal right atrial pressure.   There is no pericardial effusion.      Allergies    doxycycline (Unknown)    Intolerances    hydrochlorothiazide (Other)  	  MEDICATIONS:    memantine 5 milliGRAM(s) Oral two times a day  pantoprazole    Tablet 40 milliGRAM(s) Oral before breakfast  polyethylene glycol 3350 17 Gram(s) Oral daily PRN  simethicone 80 milliGRAM(s) Chew daily PRN  atorvastatin 10 milliGRAM(s) Oral at bedtime  aspirin enteric coated 81 milliGRAM(s) Oral daily  cholecalciferol 1000 Unit(s) Oral daily  fluticasone propionate 50 MICROgram(s)/spray Nasal Spray 1 Spray(s) Both Nostrils daily PRN  sodium chloride 0.9%. 1000 milliLiter(s) IV Continuous <Continuous>      FAMILY HISTORY:  No pertinent family history in first degree relatives      SOCIAL HISTORY:    [x ] Non-smoker          REVIEW OF SYSTEMS:  	  [x ] Unable to obtain Patient is AxOx!    PHYSICAL EXAM:  T(C): 36.6 (05-04-18 @ 09:43), Max: 36.9 (05-04-18 @ 05:29)  HR: 58 (05-04-18 @ 09:03) (55 - 63)  BP: 126/71 (05-04-18 @ 09:03) (98/64 - 140/71)  RR: 15 (05-04-18 @ 09:03) (15 - 28)  SpO2: 98% (05-04-18 @ 09:03) (97% - 100%)  Wt(kg): --  I&O's Summary    03 May 2018 07:01  -  04 May 2018 07:00  --------------------------------------------------------  IN: 1450 mL / OUT: 650 mL / NET: 800 mL    04 May 2018 07:01  -  04 May 2018 11:38  --------------------------------------------------------  IN: 520 mL / OUT: 600 mL / NET: -80 mL        TELEMETRY: 	sinus rhythm    ECG: < from: 12 Lead ECG (02.09.18 @ 23:07) >  Ventricular Rate 63 BPM    Atrial Rate 63 BPM    P-R Interval 176 ms    QRS Duration 72 ms    Q-T Interval 432 ms    QTC Calculation(Bezet) 442 ms    P Axis 50 degrees    R Axis 0 degrees    T Axis 10 degrees    Diagnosis Line Normal sinus rhythm      HEENT:   PERRL, EOMI	  Cardiovascular: S1 S2,no  edema  Respiratory: Lungs clear to auscultation	  Gastrointestinal:  Soft, Non-tender,	  Neurologic: A&O x 1( to self only)  Extremities: no edema      	  LABS:	 	    CARDIAC MARKERS:                                  10.2   5.5   )-----------( 216      ( 04 May 2018 06:07 )             31.1     05-04    139  |  103  |  22  ----------------------------<  99  3.8   |  25  |  1.07    Ca    8.4      04 May 2018 06:06  Mg     2.0     05-04    TPro  6.9  /  Alb  3.7  /  TBili  0.2  /  DBili  x   /  AST  23  /  ALT  16  /  AlkPhos  71  05-03    proBNP:   Lipid Profile:   HgA1c:   TSH:     ASSESSMENT/PLAN: 	   89 yo F with history of HTN, HLD, dementia, paroxysmal atrial fibrillation not on anticoagulation, s/p CVA , AS by history, was admitted to [facility] for evaluation of near syncopal episode. Telemetry monitoring didn't show any arrhythmias, near syncope is likely has  vasovagal  etiology secondary to dehydration. No indication for pacing at this time.

## 2018-05-04 NOTE — PROGRESS NOTE ADULT - ASSESSMENT
89 yo F with pmhx of HTN, HLD, Dementia (A&Ox1 at baseline), Cerebral Artery Occlusion, paroxysmal AFib not on AC who was BIBEMS to the [facility] ED after a pre-syncopal event, admitted to [facility] for further management.

## 2018-05-04 NOTE — DISCHARGE NOTE ADULT - SECONDARY DIAGNOSIS.
SARA (acute kidney injury) Cerebral artery occlusion with cerebral infarction Essential hypertension Hyperlipidemia Dementia

## 2018-05-04 NOTE — PROGRESS NOTE ADULT - PROBLEM SELECTOR PLAN 1
-Elderly female with pre-syncopal event witnessed by HHA after long day of exposure to sunlight and heat, recently started on new diuretic therapy with downward titration of BP meds given relative hypotension recently in outpatient setting. No fall, no LOC, no head trauma, no loss of bowel or bladder continence.  per EMS. EKG from outpatient visit earlier today NSR, recent Holter monitor without any atrial fibrillation though some episodes of bradycardia, currently being evaluated as outpatient for sick sinus syndrome, had been referred to EP. Most recent ECHO in January without any structural abnormalities. HbA1c 5.3 in January, TSH normal at that time as well. Patient anemic on admission but appears at baseline, etiology of presyncopal event likely hypovolemic vs neurocardiogenic given resolution of symptoms and return to normal BP with fluid resuscitation.  - c/w NS at 100cc/hr with frequent lung checks  - c/w telemetry monitoring at this time  - Repeat echo today to evaluation for changes  -Holding home Losartan 50 given hypo/normotension  -Holding home Torsemide 5 hypo/normotension  -EP consulted today with regards for a plan for PPM. Will f/u recs. -Elderly female with pre-syncopal event witnessed by HHA after long day of exposure to sunlight and heat, recently started on new diuretic therapy with downward titration of BP meds given relative hypotension recently in outpatient setting. No fall, no LOC, no head trauma, no loss of bowel or bladder continence.  per EMS. EKG from outpatient visit earlier today NSR, recent Holter monitor without any atrial fibrillation though some episodes of bradycardia, currently being evaluated as outpatient for sick sinus syndrome, had been referred to EP. Most recent ECHO in January without any structural abnormalities. HbA1c 5.3 in January, TSH normal at that time as well. Patient anemic on admission but appears at baseline, etiology of presyncopal event likely hypovolemic vs neurocardiogenic given resolution of symptoms and return to normal BP with fluid resuscitation.  -c/w NS at 100cc/hr with frequent lung checks  -c/w telemetry monitoring at this time  -Repeat echo today to evaluation for changes  -Holding home Losartan 50 given hypo/normotension  -Holding home Torsemide 5 given hypo/normotension  -EP consulted today with regards for a plan for PPM. Will f/u recs.

## 2018-05-04 NOTE — DISCHARGE NOTE ADULT - MEDICATION SUMMARY - MEDICATIONS TO STOP TAKING
I will STOP taking the medications listed below when I get home from the hospital:    losartan 100 mg oral tablet  -- 1 tab(s) by mouth once a day   -- Do not take this drug if you are pregnant.  It is very important that you take or use this exactly as directed.  Do not skip doses or discontinue unless directed by your doctor.  Some non-prescription drugs may aggravate your condition.  Read all labels carefully.  If a warning appears, check with your doctor before taking.    torsemide 5 mg oral tablet  -- 1 tab(s) by mouth every other day I will STOP taking the medications listed below when I get home from the hospital:    losartan 100 mg oral tablet  -- 1 tab(s) by mouth once a day   -- Do not take this drug if you are pregnant.  It is very important that you take or use this exactly as directed.  Do not skip doses or discontinue unless directed by your doctor.  Some non-prescription drugs may aggravate your condition.  Read all labels carefully.  If a warning appears, check with your doctor before taking.

## 2018-05-04 NOTE — PROGRESS NOTE ADULT - PROBLEM SELECTOR PLAN 7
-Patient with dementia, A&Ox1 at baseline  -c/w home Namenda 5mg BID  -Patient on Galantamine at home as well, not on formulary

## 2018-05-04 NOTE — PROGRESS NOTE ADULT - SUBJECTIVE AND OBJECTIVE BOX
Chief Complaint:     OVERNIGHT EVENTS: Admitted overnight for pre syncope episode. No acute events.     SUBJECTIVE / INTERVAL HPI: Patient seen and examined at bedside. AAOX1 to self (consistent with baseline). Denies any complaints at this time.     VITAL SIGNS:  Vital Signs Last 24 Hrs  T(C): 36.6 (04 May 2018 09:43), Max: 36.9 (04 May 2018 05:29)  T(F): 97.8 (04 May 2018 09:43), Max: 98.4 (04 May 2018 05:29)  HR: 58 (04 May 2018 09:03) (55 - 63)  BP: 126/71 (04 May 2018 09:03) (98/64 - 140/71)  BP(mean): 99 (04 May 2018 09:03) (82 - 99)  RR: 15 (04 May 2018 09:03) (15 - 28)  SpO2: 98% (04 May 2018 09:03) (97% - 100%)    PHYSICAL EXAM:    General: NAD  HEENT: NC/AT; PERRL, anicteric sclera; MMM  Neck: supple  Cardiovascular: +S1/S2; RRR  Respiratory: CTA B/L; no W/R/R  Gastrointestinal: supra pubic tenderness on palpation noted. BS positive.   Extremities: WWP; 2+ pitting edema B/L LE, no clubbing or cyanosis  Vascular: 2+ radial, DP/PT pulses B/L  Neurological: AAOx1 to self.     MEDICATIONS:  MEDICATIONS  (STANDING):  aspirin enteric coated 81 milliGRAM(s) Oral daily  atorvastatin 10 milliGRAM(s) Oral at bedtime  cholecalciferol 1000 Unit(s) Oral daily  memantine 5 milliGRAM(s) Oral two times a day  pantoprazole    Tablet 40 milliGRAM(s) Oral before breakfast  sodium chloride 0.9%. 1000 milliLiter(s) (100 mL/Hr) IV Continuous <Continuous>    MEDICATIONS  (PRN):  fluticasone propionate 50 MICROgram(s)/spray Nasal Spray 1 Spray(s) Both Nostrils daily PRN Congestion  polyethylene glycol 3350 17 Gram(s) Oral daily PRN Constipation  simethicone 80 milliGRAM(s) Chew daily PRN Indigestion      ALLERGIES:  Allergies  doxycycline (Unknown)  Intolerances  hydrochlorothiazide (Other)      LABS:                        10.2   5.5   )-----------( 216      ( 04 May 2018 06:07 )             31.1     05-04    139  |  103  |  22  ----------------------------<  99  3.8   |  25  |  1.07    Ca    8.4      04 May 2018 06:06  Mg     2.0     05-04    TPro  6.9  /  Alb  3.7  /  TBili  0.2  /  DBili  x   /  AST  23  /  ALT  16  /  AlkPhos  71  05-03    PT/INR - ( 04 May 2018 06:07 )   PT: 11.7 sec;   INR: 1.05          PTT - ( 04 May 2018 06:07 )  PTT:27.1 sec  Urinalysis Basic - ( 04 May 2018 01:11 )    Color: Yellow / Appearance: Clear / SG: <=1.005 / pH: x  Gluc: x / Ketone: NEGATIVE  / Bili: Negative / Urobili: 0.2 E.U./dL   Blood: x / Protein: NEGATIVE mg/dL / Nitrite: NEGATIVE   Leuk Esterase: Trace / RBC: < 5 /HPF / WBC 5-10 /HPF   Sq Epi: x / Non Sq Epi: 5-10 /HPF / Bacteria: Present /HPF    CAPILLARY BLOOD GLUCOSE    POCT Blood Glucose.: 179 mg/dL (03 May 2018 21:36)    RADIOLOGY & ADDITIONAL TESTS: Reviewed. Chief Complaint:   89 yo F with pmhx of HTN, HLD, Dementia (A&Ox1 at baseline), Cerebral Artery Occlusion, paroxysmal AFib not on AC who was BIBEMS to the [facility] ED after a pre-syncopal event, admitted to [facility] for further management.     OVERNIGHT EVENTS: Admitted overnight for pre syncopal episode. No acute events.     SUBJECTIVE / INTERVAL HPI: Patient seen and examined at bedside. AAOX1 to self (consistent with baseline). Denies any complaints at this time.     VITAL SIGNS:  Vital Signs Last 24 Hrs  T(C): 36.6 (04 May 2018 09:43), Max: 36.9 (04 May 2018 05:29)  T(F): 97.8 (04 May 2018 09:43), Max: 98.4 (04 May 2018 05:29)  HR: 58 (04 May 2018 09:03) (55 - 63)  BP: 126/71 (04 May 2018 09:03) (98/64 - 140/71)  BP(mean): 99 (04 May 2018 09:03) (82 - 99)  RR: 15 (04 May 2018 09:03) (15 - 28)  SpO2: 98% (04 May 2018 09:03) (97% - 100%)    PHYSICAL EXAM:    General: NAD  HEENT: NC/AT; PERRL, anicteric sclera; MMM  Neck: supple  Cardiovascular: +S1/S2; RRR  Respiratory: CTA B/L; no W/R/R  Gastrointestinal: supra pubic tenderness on palpation noted. BS positive.   Extremities: WWP; 2+ pitting edema B/L LE, no clubbing or cyanosis  Vascular: 2+ radial, DP/PT pulses B/L  Neurological: AAOx1 to self.     MEDICATIONS:  MEDICATIONS  (STANDING):  aspirin enteric coated 81 milliGRAM(s) Oral daily  atorvastatin 10 milliGRAM(s) Oral at bedtime  cholecalciferol 1000 Unit(s) Oral daily  memantine 5 milliGRAM(s) Oral two times a day  pantoprazole    Tablet 40 milliGRAM(s) Oral before breakfast  sodium chloride 0.9%. 1000 milliLiter(s) (100 mL/Hr) IV Continuous <Continuous>    MEDICATIONS  (PRN):  fluticasone propionate 50 MICROgram(s)/spray Nasal Spray 1 Spray(s) Both Nostrils daily PRN Congestion  polyethylene glycol 3350 17 Gram(s) Oral daily PRN Constipation  simethicone 80 milliGRAM(s) Chew daily PRN Indigestion      ALLERGIES:  Allergies  doxycycline (Unknown)  Intolerances  hydrochlorothiazide (Other)      LABS:                        10.2   5.5   )-----------( 216      ( 04 May 2018 06:07 )             31.1     05-04    139  |  103  |  22  ----------------------------<  99  3.8   |  25  |  1.07    Ca    8.4      04 May 2018 06:06  Mg     2.0     05-04    TPro  6.9  /  Alb  3.7  /  TBili  0.2  /  DBili  x   /  AST  23  /  ALT  16  /  AlkPhos  71  05-03    PT/INR - ( 04 May 2018 06:07 )   PT: 11.7 sec;   INR: 1.05          PTT - ( 04 May 2018 06:07 )  PTT:27.1 sec  Urinalysis Basic - ( 04 May 2018 01:11 )    Color: Yellow / Appearance: Clear / SG: <=1.005 / pH: x  Gluc: x / Ketone: NEGATIVE  / Bili: Negative / Urobili: 0.2 E.U./dL   Blood: x / Protein: NEGATIVE mg/dL / Nitrite: NEGATIVE   Leuk Esterase: Trace / RBC: < 5 /HPF / WBC 5-10 /HPF   Sq Epi: x / Non Sq Epi: 5-10 /HPF / Bacteria: Present /HPF    CAPILLARY BLOOD GLUCOSE    POCT Blood Glucose.: 179 mg/dL (03 May 2018 21:36)    RADIOLOGY & ADDITIONAL TESTS: Reviewed.

## 2018-05-04 NOTE — DISCHARGE NOTE ADULT - MEDICATION SUMMARY - MEDICATIONS TO TAKE
I will START or STAY ON the medications listed below when I get home from the hospital:    Aspirin Enteric Coated 81 mg oral delayed release tablet  -- 1 tab(s) by mouth once a day  -- Indication: For Cerebral artery occlusion with cerebral infarction    atorvastatin 10 mg oral tablet  -- 1 tab(s) by mouth once a day  -- Indication: For Cerebral artery occlusion with cerebral infarction    galantamine 24 mg oral capsule, extended release  -- 1 cap(s) by mouth once a day (in the morning)  -- Indication: For Dementia    Namenda 5 mg oral tablet  -- 1 tab(s) by mouth 2 times a day  -- Indication: For Dementia    simethicone 80 mg oral tablet, chewable  -- 1 tab(s) by mouth once a day, As needed, Indigestion  -- Indication: For ABDOMINAL GAS    Flonase 50 mcg/inh nasal spray  -- 1 spray(s) intranasally once a day, As Needed -for congestion   -- For the nose.  It is very important that you take or use this exactly as directed.  Do not skip doses or discontinue unless directed by your doctor.    -- Indication: For NASAL CONGESTION    pantoprazole 40 mg oral delayed release tablet  -- 1 tab(s) by mouth once a day (before a meal)  -- Indication: For GERD    Vitamin D3 1000 intl units oral tablet  -- 1 tab(s) by mouth once a day  -- Indication: For Nutrition, metabolism, and development symptoms I will START or STAY ON the medications listed below when I get home from the hospital:    Aspirin Enteric Coated 81 mg oral delayed release tablet  -- 1 tab(s) by mouth once a day  -- Indication: For Cerebral artery occlusion with cerebral infarction    atorvastatin 10 mg oral tablet  -- 1 tab(s) by mouth once a day  -- Indication: For Cerebral artery occlusion with cerebral infarction    galantamine 24 mg oral capsule, extended release  -- 1 cap(s) by mouth once a day (in the morning)  -- Indication: For Dementia    torsemide 5 mg oral tablet  -- 1 tab every other day  -- Indication: For LOWER EXTREMITY EDEMA    Namenda 5 mg oral tablet  -- 1 tab(s) by mouth 2 times a day  -- Indication: For Dementia    simethicone 80 mg oral tablet, chewable  -- 1 tab(s) by mouth once a day, As needed, Indigestion  -- Indication: For ABDOMINAL GAS    Flonase 50 mcg/inh nasal spray  -- 1 spray(s) intranasally once a day, As Needed -for congestion   -- For the nose.  It is very important that you take or use this exactly as directed.  Do not skip doses or discontinue unless directed by your doctor.    -- Indication: For NASAL CONGESTION    pantoprazole 40 mg oral delayed release tablet  -- 1 tab(s) by mouth once a day (before a meal)  -- Indication: For GERD    Vitamin D3 1000 intl units oral tablet  -- 1 tab(s) by mouth once a day  -- Indication: For Nutrition, metabolism, and development symptoms

## 2018-05-04 NOTE — DISCHARGE NOTE ADULT - CARE PLAN
Principal Discharge DX:	Syncope  Secondary Diagnosis:	SARA (acute kidney injury)  Secondary Diagnosis:	Cerebral artery occlusion with cerebral infarction  Secondary Diagnosis:	Essential hypertension  Secondary Diagnosis:	Hyperlipidemia  Secondary Diagnosis:	Dementia Principal Discharge DX:	Syncope  Goal:	To prevent further episodes of dizziness and continued follow up with your cardiologist for outpatient work up and management  Assessment and plan of treatment:	You presented with symptoms of light headedness and dizziness, you were given fluids and admitted to [facility] for further management. Your home torsemide and anti hypertensives were held upon admission. Overnight your symptoms of light headedness and dizziness resolved. Your symptoms were likely in the setting of dehydration.   Please stop taking torsemide and your blood pressure medications upon discharge and follow up with Dr. Oneal for continued outpatient follow up and management.  Secondary Diagnosis:	SARA (acute kidney injury)  Secondary Diagnosis:	Cerebral artery occlusion with cerebral infarction  Secondary Diagnosis:	Essential hypertension  Secondary Diagnosis:	Hyperlipidemia  Secondary Diagnosis:	Dementia Principal Discharge DX:	Syncope  Goal:	To prevent further episodes of dizziness and continued follow up with your cardiologist for outpatient work up and management  Assessment and plan of treatment:	You presented with symptoms of light headedness and dizziness, you were given fluids and admitted to [facility] for further management. Your home torsemide and anti hypertensives were held upon admission. Overnight your symptoms of light headedness and dizziness resolved. Your symptoms were likely in the setting of dehydration.   Please stop taking torsemide and your blood pressure medications upon discharge and follow up with Dr. Oneal for continued outpatient follow up and management. You have a follow up appointment arranged on 05/10 at 1:30 pm.  During your hospitalization, EP was consulted and as per their expert recommendations, the episode of dizziness was likely secondary to dehydration, no indications for a pacemaker at this time.  Secondary Diagnosis:	SARA (acute kidney injury)  Assessment and plan of treatment:	You had an elevated creatinine upon admission likely secondary to dehydration which resolved after your home torsemide was held and you were given fluids. Please continue to follow up with Dr. Conklin for continued management.  Secondary Diagnosis:	Cerebral artery occlusion with cerebral infarction  Assessment and plan of treatment:	Please continue to take aspirin daily.   Please continue to take lipitor daily.  Secondary Diagnosis:	Essential hypertension  Assessment and plan of treatment:	We held your blood pressure medications upon admission as your blood pressure on admission was low and you presented with symptoms of dizziness. Upon discharge, please continue to not take your home blood pressure medications until you follow up with Dr. Stahl and Dr. Conklin as an outpatient.  Secondary Diagnosis:	Hyperlipidemia  Assessment and plan of treatment:	Please continue to take lipitor daily.  Secondary Diagnosis:	Dementia  Assessment and plan of treatment:	Please continue to take your home medications. Principal Discharge DX:	Syncope  Goal:	To prevent further episodes of dizziness and continue follow up with your cardiologist for outpatient work up and management  Assessment and plan of treatment:	You presented with symptoms of light headedness and dizziness, you were given fluids and admitted to [facility] for further management. Your home torsemide and anti hypertensives were held upon admission. Overnight your symptoms of light headedness and dizziness resolved. Your symptoms were likely in the setting of dehydration.   Please stop taking torsemide and your blood pressure medications upon discharge and follow up with Dr. Stahl for continued outpatient follow up and management. You have a follow up appointment arranged on 05/10 at 1:30 pm.  During your hospitalization, EP was consulted and as per their expert recommendations, the episode of dizziness was likely secondary to dehydration, no indications for a pacemaker at this time.  Secondary Diagnosis:	SARA (acute kidney injury)  Assessment and plan of treatment:	You had an elevated creatinine upon admission likely secondary to dehydration which resolved after your home torsemide was held and you were given fluids. Please continue to follow up with Dr. Conklin for continued management.  Secondary Diagnosis:	Cerebral artery occlusion with cerebral infarction  Assessment and plan of treatment:	Please continue to take aspirin daily.   Please continue to take lipitor daily.  Secondary Diagnosis:	Essential hypertension  Assessment and plan of treatment:	We held your blood pressure medications upon admission as your blood pressure on admission was low and you presented with symptoms of dizziness. Upon discharge, please continue to not take your home blood pressure medications until you follow up with Dr. Stahl and Dr. Conklin as an outpatient.  Secondary Diagnosis:	Hyperlipidemia  Assessment and plan of treatment:	Please continue to take lipitor daily.  Secondary Diagnosis:	Dementia  Assessment and plan of treatment:	Please continue to take your home medications. Principal Discharge DX:	Syncope  Goal:	To prevent further episodes of dizziness and continue follow up with your cardiologist for outpatient work up and management  Assessment and plan of treatment:	You presented with symptoms of light headedness and dizziness, you were given fluids and admitted to [facility] for further management. Your home torsemide and anti hypertensives were held upon admission. Overnight your symptoms of light headedness and dizziness resolved. Your symptoms were likely in the setting of dehydration.   Please continue taking torsemide as prescribed 5 mg every other day.  Please stop taking your losartan to avoid decrease in your blood pressures and follow up with Dr. Stahl for continued outpatient follow up and management. You have a follow up appointment arranged on 05/10 at 1:30 pm.  During your hospitalization, EP was consulted and as per their expert recommendations, the episode of dizziness was likely secondary to dehydration, no indications for a pacemaker at this time.  Secondary Diagnosis:	SARA (acute kidney injury)  Assessment and plan of treatment:	You had an elevated creatinine upon admission likely secondary to dehydration which resolved after your home torsemide was held and you were given fluids. Please continue to follow up with Dr. Conklin for continued management.  Secondary Diagnosis:	Cerebral artery occlusion with cerebral infarction  Assessment and plan of treatment:	Please continue to take aspirin daily.   Please continue to take lipitor daily.  Secondary Diagnosis:	Essential hypertension  Assessment and plan of treatment:	We held your blood pressure medications upon admission as your blood pressure on admission was low and you presented with symptoms of dizziness. Upon discharge, please continue to take your home torsemide as prescribed. Please stop your losartan and follow up with Dr. Stahl and Dr. Adams after discharge for continued management.  Secondary Diagnosis:	Hyperlipidemia  Assessment and plan of treatment:	Please continue to take lipitor daily.  Secondary Diagnosis:	Dementia  Assessment and plan of treatment:	Please continue to take your home medications.

## 2018-05-04 NOTE — PROGRESS NOTE ADULT - PROBLEM SELECTOR PLAN 2
IMPROVING.  Patient with Cr of 1.33 on admission, baseline appears to be 0.90. Patient out in sun and heat for many hours with initiation of Torsemide recently as outpatient. Torsemide held upon admission and started on maintainence fluids with improvement of creatinine. Continue to trend. IMPROVING.  Patient with Cr of 1.33 on admission, baseline appears to be 0.90. Patient out in sun and heat for many hours with initiation of Torsemide recently as outpatient. Torsemide held upon admission and started on maintenance fluids with improvement of creatinine. Continue to trend BMP.

## 2018-05-05 LAB
CULTURE RESULTS: SIGNIFICANT CHANGE UP
SPECIMEN SOURCE: SIGNIFICANT CHANGE UP

## 2018-05-08 DIAGNOSIS — R41.3 OTHER AMNESIA: ICD-10-CM

## 2018-05-08 DIAGNOSIS — I95.9 HYPOTENSION, UNSPECIFIED: ICD-10-CM

## 2018-05-08 DIAGNOSIS — E87.1 HYPO-OSMOLALITY AND HYPONATREMIA: ICD-10-CM

## 2018-05-08 DIAGNOSIS — E86.0 DEHYDRATION: ICD-10-CM

## 2018-05-08 DIAGNOSIS — F03.90 UNSPECIFIED DEMENTIA WITHOUT BEHAVIORAL DISTURBANCE: ICD-10-CM

## 2018-05-08 DIAGNOSIS — I66.9 OCCLUSION AND STENOSIS OF UNSPECIFIED CEREBRAL ARTERY: ICD-10-CM

## 2018-05-08 DIAGNOSIS — R00.1 BRADYCARDIA, UNSPECIFIED: ICD-10-CM

## 2018-05-08 DIAGNOSIS — R55 SYNCOPE AND COLLAPSE: ICD-10-CM

## 2018-05-08 DIAGNOSIS — Z79.82 LONG TERM (CURRENT) USE OF ASPIRIN: ICD-10-CM

## 2018-05-08 DIAGNOSIS — I10 ESSENTIAL (PRIMARY) HYPERTENSION: ICD-10-CM

## 2018-05-08 DIAGNOSIS — K21.9 GASTRO-ESOPHAGEAL REFLUX DISEASE WITHOUT ESOPHAGITIS: ICD-10-CM

## 2018-05-08 DIAGNOSIS — N17.9 ACUTE KIDNEY FAILURE, UNSPECIFIED: ICD-10-CM

## 2018-05-08 DIAGNOSIS — Z86.73 PERSONAL HISTORY OF TRANSIENT ISCHEMIC ATTACK (TIA), AND CEREBRAL INFARCTION WITHOUT RESIDUAL DEFICITS: ICD-10-CM

## 2018-05-08 DIAGNOSIS — D64.9 ANEMIA, UNSPECIFIED: ICD-10-CM

## 2018-05-08 DIAGNOSIS — I48.0 PAROXYSMAL ATRIAL FIBRILLATION: ICD-10-CM

## 2018-05-08 DIAGNOSIS — I35.0 NONRHEUMATIC AORTIC (VALVE) STENOSIS: ICD-10-CM

## 2018-05-09 LAB
ALBUMIN SERPL ELPH-MCNC: 4.3 G/DL
ALP BLD-CCNC: 82 U/L
ALT SERPL-CCNC: 19 U/L
ANION GAP SERPL CALC-SCNC: 13 MMOL/L
AST SERPL-CCNC: 23 U/L
BASOPHILS # BLD AUTO: 0.06 K/UL
BASOPHILS NFR BLD AUTO: 1 %
BILIRUB SERPL-MCNC: 0.4 MG/DL
BUN SERPL-MCNC: 27 MG/DL
CALCIUM SERPL-MCNC: 9.6 MG/DL
CHLORIDE SERPL-SCNC: 99 MMOL/L
CHOLEST SERPL-MCNC: 236 MG/DL
CHOLEST/HDLC SERPL: 2.4 RATIO
CO2 SERPL-SCNC: 28 MMOL/L
CREAT SERPL-MCNC: 1.36 MG/DL
EOSINOPHIL # BLD AUTO: 0.15 K/UL
EOSINOPHIL NFR BLD AUTO: 2.5 %
GLUCOSE SERPL-MCNC: 106 MG/DL
HBA1C MFR BLD HPLC: 5.6 %
HCT VFR BLD CALC: 37.7 %
HDLC SERPL-MCNC: 98 MG/DL
HGB BLD-MCNC: 12 G/DL
IMM GRANULOCYTES NFR BLD AUTO: 0.2 %
LDLC SERPL CALC-MCNC: 118 MG/DL
LYMPHOCYTES # BLD AUTO: 1.55 K/UL
LYMPHOCYTES NFR BLD AUTO: 25.3 %
MAGNESIUM SERPL-MCNC: 2.1 MG/DL
MAN DIFF?: NORMAL
MCHC RBC-ENTMCNC: 30.8 PG
MCHC RBC-ENTMCNC: 31.8 GM/DL
MCV RBC AUTO: 96.7 FL
MONOCYTES # BLD AUTO: 0.6 K/UL
MONOCYTES NFR BLD AUTO: 9.8 %
NEUTROPHILS # BLD AUTO: 3.75 K/UL
NEUTROPHILS NFR BLD AUTO: 61.2 %
PLATELET # BLD AUTO: 278 K/UL
POTASSIUM SERPL-SCNC: 4.5 MMOL/L
PROT SERPL-MCNC: 7.5 G/DL
RBC # BLD: 3.9 M/UL
RBC # FLD: 15.4 %
SODIUM SERPL-SCNC: 140 MMOL/L
TRIGL SERPL-MCNC: 101 MG/DL
VIT B12 SERPL-MCNC: 417 PG/ML
WBC # FLD AUTO: 6.12 K/UL

## 2018-05-10 ENCOUNTER — APPOINTMENT (OUTPATIENT)
Dept: HEART AND VASCULAR | Facility: CLINIC | Age: 83
End: 2018-05-10
Payer: MEDICARE

## 2018-05-10 ENCOUNTER — APPOINTMENT (OUTPATIENT)
Dept: NEPHROLOGY | Facility: CLINIC | Age: 83
End: 2018-05-10
Payer: MEDICARE

## 2018-05-10 VITALS — DIASTOLIC BLOOD PRESSURE: 83 MMHG | HEART RATE: 72 BPM | SYSTOLIC BLOOD PRESSURE: 120 MMHG

## 2018-05-10 VITALS
BODY MASS INDEX: 24.66 KG/M2 | HEIGHT: 62 IN | DIASTOLIC BLOOD PRESSURE: 90 MMHG | WEIGHT: 134 LBS | TEMPERATURE: 97.9 F | SYSTOLIC BLOOD PRESSURE: 160 MMHG | OXYGEN SATURATION: 97 % | HEART RATE: 60 BPM

## 2018-05-10 DIAGNOSIS — R60.9 EDEMA, UNSPECIFIED: ICD-10-CM

## 2018-05-10 DIAGNOSIS — I10 ESSENTIAL (PRIMARY) HYPERTENSION: ICD-10-CM

## 2018-05-10 PROCEDURE — 99496 TRANSJ CARE MGMT HIGH F2F 7D: CPT

## 2018-05-10 PROCEDURE — 99214 OFFICE O/P EST MOD 30 MIN: CPT

## 2018-05-10 PROCEDURE — 93000 ELECTROCARDIOGRAM COMPLETE: CPT

## 2018-05-10 RX ORDER — LOSARTAN POTASSIUM 50 MG/1
50 TABLET, FILM COATED ORAL
Qty: 90 | Refills: 3 | Status: DISCONTINUED | COMMUNITY
Start: 2018-03-06 | End: 2018-05-10

## 2018-05-11 ENCOUNTER — APPOINTMENT (OUTPATIENT)
Dept: OTOLARYNGOLOGY | Facility: CLINIC | Age: 83
End: 2018-05-11

## 2018-05-13 ENCOUNTER — TRANSCRIPTION ENCOUNTER (OUTPATIENT)
Age: 83
End: 2018-05-13

## 2018-05-17 ENCOUNTER — APPOINTMENT (OUTPATIENT)
Dept: HEART AND VASCULAR | Facility: CLINIC | Age: 83
End: 2018-05-17
Payer: MEDICARE

## 2018-05-17 VITALS
OXYGEN SATURATION: 98 % | BODY MASS INDEX: 24.84 KG/M2 | HEART RATE: 73 BPM | DIASTOLIC BLOOD PRESSURE: 80 MMHG | TEMPERATURE: 97.6 F | HEIGHT: 62 IN | SYSTOLIC BLOOD PRESSURE: 140 MMHG | WEIGHT: 135 LBS

## 2018-05-17 PROCEDURE — 93000 ELECTROCARDIOGRAM COMPLETE: CPT

## 2018-05-17 PROCEDURE — 99215 OFFICE O/P EST HI 40 MIN: CPT

## 2018-05-31 ENCOUNTER — APPOINTMENT (OUTPATIENT)
Dept: NEPHROLOGY | Facility: CLINIC | Age: 83
End: 2018-05-31

## 2018-06-05 ENCOUNTER — APPOINTMENT (OUTPATIENT)
Dept: OTOLARYNGOLOGY | Facility: CLINIC | Age: 83
End: 2018-06-05

## 2018-06-07 LAB — BACTERIA UR CULT: NORMAL

## 2018-06-08 ENCOUNTER — APPOINTMENT (OUTPATIENT)
Dept: UROGYNECOLOGY | Facility: CLINIC | Age: 83
End: 2018-06-08
Payer: MEDICARE

## 2018-06-08 DIAGNOSIS — N39.0 URINARY TRACT INFECTION, SITE NOT SPECIFIED: ICD-10-CM

## 2018-06-08 PROCEDURE — 99214 OFFICE O/P EST MOD 30 MIN: CPT

## 2018-06-08 PROCEDURE — 51798 US URINE CAPACITY MEASURE: CPT

## 2018-06-17 ENCOUNTER — TRANSCRIPTION ENCOUNTER (OUTPATIENT)
Age: 83
End: 2018-06-17

## 2018-06-27 ENCOUNTER — TRANSCRIPTION ENCOUNTER (OUTPATIENT)
Age: 83
End: 2018-06-27

## 2018-07-28 PROBLEM — Z86.73 HISTORY OF STROKE: Status: RESOLVED | Noted: 2018-03-09 | Resolved: 2018-07-28

## 2018-07-29 ENCOUNTER — TRANSCRIPTION ENCOUNTER (OUTPATIENT)
Age: 83
End: 2018-07-29

## 2018-08-01 ENCOUNTER — TRANSCRIPTION ENCOUNTER (OUTPATIENT)
Age: 83
End: 2018-08-01

## 2018-08-09 ENCOUNTER — APPOINTMENT (OUTPATIENT)
Dept: HEART AND VASCULAR | Facility: CLINIC | Age: 83
End: 2018-08-09

## 2018-08-16 ENCOUNTER — APPOINTMENT (OUTPATIENT)
Dept: HEART AND VASCULAR | Facility: CLINIC | Age: 83
End: 2018-08-16

## 2018-08-16 ENCOUNTER — EMERGENCY (EMERGENCY)
Facility: HOSPITAL | Age: 83
LOS: 1 days | Discharge: HOME | End: 2018-08-16
Attending: EMERGENCY MEDICINE | Admitting: EMERGENCY MEDICINE
Payer: MEDICARE

## 2018-08-16 VITALS
WEIGHT: 134.04 LBS | SYSTOLIC BLOOD PRESSURE: 151 MMHG | HEART RATE: 69 BPM | OXYGEN SATURATION: 96 % | RESPIRATION RATE: 18 BRPM | DIASTOLIC BLOOD PRESSURE: 83 MMHG | TEMPERATURE: 99 F

## 2018-08-16 VITALS
HEART RATE: 85 BPM | SYSTOLIC BLOOD PRESSURE: 154 MMHG | RESPIRATION RATE: 16 BRPM | DIASTOLIC BLOOD PRESSURE: 87 MMHG | TEMPERATURE: 98 F | OXYGEN SATURATION: 96 %

## 2018-08-16 DIAGNOSIS — Z79.82 LONG TERM (CURRENT) USE OF ASPIRIN: ICD-10-CM

## 2018-08-16 DIAGNOSIS — Z88.1 ALLERGY STATUS TO OTHER ANTIBIOTIC AGENTS STATUS: ICD-10-CM

## 2018-08-16 DIAGNOSIS — R41.82 ALTERED MENTAL STATUS, UNSPECIFIED: ICD-10-CM

## 2018-08-16 DIAGNOSIS — I10 ESSENTIAL (PRIMARY) HYPERTENSION: ICD-10-CM

## 2018-08-16 DIAGNOSIS — Z88.8 ALLERGY STATUS TO OTHER DRUGS, MEDICAMENTS AND BIOLOGICAL SUBSTANCES: ICD-10-CM

## 2018-08-16 DIAGNOSIS — Z79.899 OTHER LONG TERM (CURRENT) DRUG THERAPY: ICD-10-CM

## 2018-08-16 DIAGNOSIS — R55 SYNCOPE AND COLLAPSE: ICD-10-CM

## 2018-08-16 DIAGNOSIS — E78.5 HYPERLIPIDEMIA, UNSPECIFIED: ICD-10-CM

## 2018-08-16 PROBLEM — I35.0 NONRHEUMATIC AORTIC (VALVE) STENOSIS: Chronic | Status: ACTIVE | Noted: 2018-01-16

## 2018-08-16 PROBLEM — J32.9 CHRONIC SINUSITIS, UNSPECIFIED: Chronic | Status: ACTIVE | Noted: 2018-01-16

## 2018-08-16 LAB
ALBUMIN SERPL ELPH-MCNC: 3.7 G/DL — SIGNIFICANT CHANGE UP (ref 3.3–5)
ALP SERPL-CCNC: 75 U/L — SIGNIFICANT CHANGE UP (ref 40–120)
ALT FLD-CCNC: 13 U/L — SIGNIFICANT CHANGE UP (ref 10–45)
ANION GAP SERPL CALC-SCNC: 12 MMOL/L — SIGNIFICANT CHANGE UP (ref 5–17)
APTT BLD: 26.5 SEC — LOW (ref 27.5–37.4)
AST SERPL-CCNC: 23 U/L — SIGNIFICANT CHANGE UP (ref 10–40)
BASOPHILS NFR BLD AUTO: 0.8 % — SIGNIFICANT CHANGE UP (ref 0–2)
BILIRUB SERPL-MCNC: 0.5 MG/DL — SIGNIFICANT CHANGE UP (ref 0.2–1.2)
BUN SERPL-MCNC: 23 MG/DL — SIGNIFICANT CHANGE UP (ref 7–23)
CALCIUM SERPL-MCNC: 9.6 MG/DL — SIGNIFICANT CHANGE UP (ref 8.4–10.5)
CHLORIDE SERPL-SCNC: 102 MMOL/L — SIGNIFICANT CHANGE UP (ref 96–108)
CK MB CFR SERPL CALC: 1.9 NG/ML — SIGNIFICANT CHANGE UP (ref 0–6.7)
CK SERPL-CCNC: 63 U/L — SIGNIFICANT CHANGE UP (ref 25–170)
CO2 SERPL-SCNC: 26 MMOL/L — SIGNIFICANT CHANGE UP (ref 22–31)
CREAT SERPL-MCNC: 0.96 MG/DL — SIGNIFICANT CHANGE UP (ref 0.5–1.3)
EOSINOPHIL NFR BLD AUTO: 2.2 % — SIGNIFICANT CHANGE UP (ref 0–6)
GLUCOSE SERPL-MCNC: 128 MG/DL — HIGH (ref 70–99)
HCT VFR BLD CALC: 35.1 % — SIGNIFICANT CHANGE UP (ref 34.5–45)
HGB BLD-MCNC: 11.6 G/DL — SIGNIFICANT CHANGE UP (ref 11.5–15.5)
INR BLD: 1.08 — SIGNIFICANT CHANGE UP (ref 0.88–1.16)
LYMPHOCYTES # BLD AUTO: 12.7 % — LOW (ref 13–44)
MCHC RBC-ENTMCNC: 30.1 PG — SIGNIFICANT CHANGE UP (ref 27–34)
MCHC RBC-ENTMCNC: 33 G/DL — SIGNIFICANT CHANGE UP (ref 32–36)
MCV RBC AUTO: 90.9 FL — SIGNIFICANT CHANGE UP (ref 80–100)
MONOCYTES NFR BLD AUTO: 6.7 % — SIGNIFICANT CHANGE UP (ref 2–14)
NEUTROPHILS NFR BLD AUTO: 77.6 % — HIGH (ref 43–77)
PLATELET # BLD AUTO: 292 K/UL — SIGNIFICANT CHANGE UP (ref 150–400)
POTASSIUM SERPL-MCNC: 4.4 MMOL/L — SIGNIFICANT CHANGE UP (ref 3.5–5.3)
POTASSIUM SERPL-SCNC: 4.4 MMOL/L — SIGNIFICANT CHANGE UP (ref 3.5–5.3)
PROT SERPL-MCNC: 7 G/DL — SIGNIFICANT CHANGE UP (ref 6–8.3)
PROTHROM AB SERPL-ACNC: 12 SEC — SIGNIFICANT CHANGE UP (ref 9.8–12.7)
RBC # BLD: 3.86 M/UL — SIGNIFICANT CHANGE UP (ref 3.8–5.2)
RBC # FLD: 14.8 % — SIGNIFICANT CHANGE UP (ref 10.3–16.9)
SODIUM SERPL-SCNC: 140 MMOL/L — SIGNIFICANT CHANGE UP (ref 135–145)
TROPONIN T SERPL-MCNC: <0.01 NG/ML — SIGNIFICANT CHANGE UP (ref 0–0.01)
WBC # BLD: 6 K/UL — SIGNIFICANT CHANGE UP (ref 3.8–10.5)
WBC # FLD AUTO: 6 K/UL — SIGNIFICANT CHANGE UP (ref 3.8–10.5)

## 2018-08-16 PROCEDURE — 99284 EMERGENCY DEPT VISIT MOD MDM: CPT | Mod: 25

## 2018-08-16 PROCEDURE — 71045 X-RAY EXAM CHEST 1 VIEW: CPT | Mod: 26

## 2018-08-16 PROCEDURE — 85730 THROMBOPLASTIN TIME PARTIAL: CPT

## 2018-08-16 PROCEDURE — 82550 ASSAY OF CK (CPK): CPT

## 2018-08-16 PROCEDURE — 80053 COMPREHEN METABOLIC PANEL: CPT

## 2018-08-16 PROCEDURE — 84484 ASSAY OF TROPONIN QUANT: CPT

## 2018-08-16 PROCEDURE — 71045 X-RAY EXAM CHEST 1 VIEW: CPT

## 2018-08-16 PROCEDURE — 99285 EMERGENCY DEPT VISIT HI MDM: CPT | Mod: 25

## 2018-08-16 PROCEDURE — 82962 GLUCOSE BLOOD TEST: CPT

## 2018-08-16 PROCEDURE — 93005 ELECTROCARDIOGRAM TRACING: CPT

## 2018-08-16 PROCEDURE — 85025 COMPLETE CBC W/AUTO DIFF WBC: CPT

## 2018-08-16 PROCEDURE — 93010 ELECTROCARDIOGRAM REPORT: CPT

## 2018-08-16 PROCEDURE — 85610 PROTHROMBIN TIME: CPT

## 2018-08-16 PROCEDURE — 82553 CREATINE MB FRACTION: CPT

## 2018-08-16 RX ORDER — SODIUM CHLORIDE 9 MG/ML
1000 INJECTION INTRAMUSCULAR; INTRAVENOUS; SUBCUTANEOUS ONCE
Qty: 0 | Refills: 0 | Status: COMPLETED | OUTPATIENT
Start: 2018-08-16 | End: 2018-08-16

## 2018-08-16 RX ORDER — SODIUM CHLORIDE 9 MG/ML
3 INJECTION INTRAMUSCULAR; INTRAVENOUS; SUBCUTANEOUS ONCE
Qty: 0 | Refills: 0 | Status: COMPLETED | OUTPATIENT
Start: 2018-08-16 | End: 2018-08-16

## 2018-08-16 RX ADMIN — SODIUM CHLORIDE 1000 MILLILITER(S): 9 INJECTION INTRAMUSCULAR; INTRAVENOUS; SUBCUTANEOUS at 12:17

## 2018-08-16 RX ADMIN — SODIUM CHLORIDE 2000 MILLILITER(S): 9 INJECTION INTRAMUSCULAR; INTRAVENOUS; SUBCUTANEOUS at 11:55

## 2018-08-16 RX ADMIN — SODIUM CHLORIDE 3 MILLILITER(S): 9 INJECTION INTRAMUSCULAR; INTRAVENOUS; SUBCUTANEOUS at 11:55

## 2018-08-16 NOTE — ED ADULT NURSE NOTE - NSIMPLEMENTINTERV_GEN_ALL_ED
Implemented All Fall Risk Interventions:  [place] to call system. Call bell, personal items and telephone within reach. Instruct patient to call for assistance. Room bathroom lighting operational. Non-slip footwear when patient is off stretcher. Physically safe environment: no spills, clutter or unnecessary equipment. Stretcher in lowest position, wheels locked, appropriate side rails in place. Provide visual cue, wrist band, yellow gown, etc. Monitor gait and stability. Monitor for mental status changes and reorient to person, place, and time. Review medications for side effects contributing to fall risk. Reinforce activity limits and safety measures with patient and family.

## 2018-08-16 NOTE — ED PROVIDER NOTE - CHIEF COMPLAINT
The patient is a 89y Female complaining of syncope. The patient is a 89y Female complaining of altered MS.

## 2018-08-16 NOTE — ED ADULT NURSE NOTE - OBJECTIVE STATEMENT
received pt in room 20. A&Ox1, hx Alzheimer's. per family, pt syncopized within wheelchair for a few seconds this afternoon. aide was present during episode. per aide, concerned pedestrians called 911. family does not agree with ed eval. pt had recent echo/ekg. pt well appearing. no recent c.o cp, sob, cough. fs wnl. ekg completed, iv placed, labs drawn. md at bedside for eval.

## 2018-08-16 NOTE — ED ADULT NURSE NOTE - CHIEF COMPLAINT QUOTE
pt BIBA s/p syncope episode. As per home health aid, " they were walking when she felt like the patient was very heavy and noticed that she had passed out for a few sec". as per EMS pt was alert and awake on seen vomited x1.  PMH dementia, as per family pt is alert to self only.  denies falling or head injury. , ekg in progress.

## 2018-08-16 NOTE — ED PROVIDER NOTE - MEDICAL DECISION MAKING DETAILS
89 yo F with a PMH of HTN, HLD, Dementia (A&Ox1 at baseline), Cerebral Artery Occlusion, paroxysmal A Fib not on AC BIBA to [facility] ED after a pre-syncopal event while out at a store with her home health aide (HHA). As per home health aide, "they were walking together when she felt like the patient was very heavy and leaned against her". Store owners sat pt down in a chair and EMS was called and pt was BIBA to ED. Per EMS, pt was alert and awake on scene and vomited nbnb emesis x1. Per HHA, contrary to triage pt had no LOC. Denies fall, trauma or head injury, urinary or fecal incontinence. FSBG 128 on ED arrival. Per family and HHA who are at bedside and very involved, pt is at baseline now. Unable to obtain any history from pt as pt is severely demented. No reported recent fevers, chills, cough, SOB, CP, palpitations, n/v/d, some recent constipation, mild lower extremity edema which is chronic. Labs/ studies noted. While in ED pt had a large formed brown BM. No diarrhea. No bleeding. Findings/ studies/ labs discussed at length with family who would like pt to be discharged home. Pt is non-toxic appearing with negative w/up and normal VS. To f/up outpt. Suspect symptoms are secondary to heat exhaustion (very hot summer day today) vs mild dehydration. Pt with no true syncope or LOC. Case discussed with pt's cardiologist and PCP and pt to f/up outpt.

## 2018-08-16 NOTE — ED PROVIDER NOTE - MUSCULOSKELETAL, MLM
Spine appears normal, range of motion is not limited, no muscle or joint tenderness, b/l LE edema noted- chronic per family

## 2018-08-16 NOTE — ED ADULT TRIAGE NOTE - CHIEF COMPLAINT QUOTE
pt BIBA s/p syncope episode. As per home health aid, " they were walking when she felt like the patient was very heavy and noticed that she had passed out for a few sec". as per EMS pt was alert and awake on seen vomited x1.  PMH dementia, as per family pt is alert to self only.  denies falling or head injury. pt BIBA s/p syncope episode. As per home health aid, " they were walking when she felt like the patient was very heavy and noticed that she had passed out for a few sec". as per EMS pt was alert and awake on seen vomited x1.  PMH dementia, as per family pt is alert to self only.  denies falling or head injury. , ekg in progress.

## 2018-08-16 NOTE — ED PROVIDER NOTE - OBJECTIVE STATEMENT
Patient is a 89 yo F with a PMHx of HTN, HLD, Dementia (A&Ox1 at baseline), Cerebral Artery Occlusion, paroxysmal A Fib not on AC who was BIBEMS to [facility] ED after a pre-syncopal event while out to dinner with her HHA. Per family and HHA, patient is active at baseline and had been in her usual state of health when after her appointment with Dr. Conklin, she went to the park. Patient was out in the heat and sun for a few hours prior to going out to dinner with HHA where they sat outside. After the meal, patient stood up and began to feel lightheaded. HHA grabbed patient while bystanders placed her in a chair. HHA denies that patient lost conciseness or fell, no urinary or fecal incontinence. Patient does not recall episode though this is baseline per family. Bystanders called EMS. No reported recent fevers, chills, cough, SOB, CP, palpitations, n/v/d, some recent constipation, mild lower extremity edema which is chronic. Of note, patient was recently started on Torsemide as an outpatient for lower extremity edema and the dosage of her BP medication has been 89 yo F with a PMH of HTN, HLD, Dementia (A&Ox1 at baseline), Cerebral Artery Occlusion, paroxysmal A Fib not on AC BIBA to [facility] ED after a pre-syncopal event while out at a store with her home health aide (HHA). As per home health aide, "they were walking together when she felt like the patient was very heavy and leaned against her". Store owners sat pt down in a chair and EMS was called and pt was BIBA to ED. Per EMS, pt was alert and awake on scene and vomited nbnb emesis x1. Per HHA, contrary to triage pt had no LOC. Denies fall, trauma or head injury, urinary or fecal incontinence. FSBG 128 on ED arrival. Per family and HHA who are at bedside and very involved, pt is at baseline now. Unable to obtain any history from pt as pt is severely demented. No reported recent fevers, chills, cough, SOB, CP, palpitations, n/v/d, some recent constipation, mild lower extremity edema which is chronic.

## 2018-11-06 ENCOUNTER — TRANSCRIPTION ENCOUNTER (OUTPATIENT)
Age: 83
End: 2018-11-06

## 2018-12-19 ENCOUNTER — APPOINTMENT (OUTPATIENT)
Dept: HEART AND VASCULAR | Facility: CLINIC | Age: 83
End: 2018-12-19
Payer: MEDICARE

## 2018-12-19 VITALS
HEART RATE: 84 BPM | BODY MASS INDEX: 22.08 KG/M2 | HEIGHT: 61.81 IN | TEMPERATURE: 97.3 F | WEIGHT: 120 LBS | OXYGEN SATURATION: 96 % | SYSTOLIC BLOOD PRESSURE: 130 MMHG | DIASTOLIC BLOOD PRESSURE: 80 MMHG

## 2018-12-19 DIAGNOSIS — Z78.9 OTHER SPECIFIED HEALTH STATUS: ICD-10-CM

## 2018-12-19 DIAGNOSIS — Z82.49 FAMILY HISTORY OF ISCHEMIC HEART DISEASE AND OTHER DISEASES OF THE CIRCULATORY SYSTEM: ICD-10-CM

## 2018-12-19 PROCEDURE — 99215 OFFICE O/P EST HI 40 MIN: CPT

## 2018-12-19 PROCEDURE — 93000 ELECTROCARDIOGRAM COMPLETE: CPT

## 2018-12-19 RX ORDER — ASPIRIN ENTERIC COATED TABLETS 81 MG 81 MG/1
81 TABLET, DELAYED RELEASE ORAL DAILY
Qty: 1 | Refills: 0 | Status: ACTIVE | COMMUNITY

## 2018-12-19 RX ORDER — ATORVASTATIN CALCIUM 10 MG/1
10 TABLET, FILM COATED ORAL DAILY
Qty: 1 | Refills: 0 | Status: ACTIVE | COMMUNITY

## 2018-12-19 RX ORDER — GALANTAMINE 24 MG/1
24 CAPSULE, EXTENDED RELEASE ORAL DAILY
Refills: 0 | Status: ACTIVE | COMMUNITY

## 2018-12-19 RX ORDER — MENTHOL/CAMPHOR 0.5 %-0.5%
1000 LOTION (ML) TOPICAL DAILY
Refills: 0 | Status: ACTIVE | COMMUNITY

## 2018-12-19 RX ORDER — MEMANTINE HYDROCHLORIDE 5 MG/1
5 TABLET, FILM COATED ORAL TWICE DAILY
Refills: 0 | Status: ACTIVE | COMMUNITY

## 2018-12-19 NOTE — HISTORY OF PRESENT ILLNESS
[FreeTextEntry1] : 89 y/o active female (lives alone w/24hour home health aid) w/ PMHx of HTN, \par HPL, Dementia, hx Cerebral Artery Occlusion, AS, pAfib (no AC, pt. new onset \par two weeks ago 2/2 to UTI/Pyelo in [place]; currently SR) and hx of recent \par hospitalization in [place], [place] for UTI/Pyelo and Sinusitis X 4days (tx \par with Abx) two weeks ago while on vacation with family, BIBA to [facility] ER this \par evening, 1/15/17, after witness syncope at restaurant with family, no prodrome \par symptoms reported.  Pt. and her family flew back today from [place] feeling \par fine until this evening while having dinner at restaurant.  According to \par patient's son, they were eating desert when his mother slumped over in chair, \par staring with mild shaking of hands, pt. was unresponsive for approximately one \par minute.  Pt. denies urinary or bowel incontinence, AMS afterwards. While on \par vacation in [place] this month pt. developed complicated UTI with Sinusitis \par requiring hospitalization for treatment with Abx two weeks ago.  Pt. was \par hospitalized for four days w/ IV abx  and discharged with oral Abx in which pt. \par recently finished. Pt. son reports pt.'s Cardiologist, who is affiliated with \[facility], told his mother she has AS, and has been monitoring her \par carefully. Pt. denies fever, chills, cp, SOB, abdominal discomfort and N/V.  In \par ER ECG reveals Sinus Heri @59bpm with non specific ST-T wave changes, CE \par negative X1, UA reveals UTI (repeat ordered), WBC normal, Blood Glucose 188, \par CXR no acute pathology seen, CTA of head w/o contrast reveals no acute \par hemorrhage or ischemic changes; sinusitis is seen.  Fluids were started in ER \par (NS 1 Liter run @75cc hour; stopped once pt. admitted to floor).  Pt. admitted \par to [facility] for telemetry, ECG, serial CE, Echocardiogram, repeat UA and urine Cx, \par  TSH and syncope workup.  Discuss plan with Dr. Stahl in AM. \par \par Upon admission patient had no events on tele, r/o ACS, ECG nonischemic, \par remained asymptomatic, and underwent echocardiogram 1/16 revealing Normal left \par ventricular size and wall thickness.The left ventricular wall motion is normal. \par The left ventricular ejection fraction is estimated to be 60-65%The left atrial \par size is normal. Right atrial size is normal. The right ventricle is mildly \par dilated. Structurally normal mitral valve. No mitral regurgitation noted. \par Structurally normal tricuspid valve. There is no echocardiographic evidence for \par pulmonary hypertension. Structurally normal pulmonic valve There is trace \par pulmonic regurgitation. No aortic root dilatation. The inferior vena cava is \par normal in size (<2.1 cm) with normal inspiratory collapse (>50%) consistent \par with normal right atrial pressure. There is no pericardial effusion. \par \par Underwent carotid US revealing < 50% plaque b/l carotid arteries as verbally \par read by radiologist. \par UA was repeated which was negative for UTI and pt asymptomatic. UCx sent  \silvio lance f/u as outpatient-- insignificant amt of growth. Remained afebrile \par WBC NL. \par Was hydrated overnight 60 cc/8hrs. Patient ambulated with family and PT. \par This AM 1/17 patient's labs WNL, lytes WNL, VSS, (HR ranged from 49-70s o/n \par asymptomatic while sleeping, d/w attending no intervention @ this time), \par asymptomatic, denies cp, sob, meek, palpitations, lightheadedness, syncope. She \par has HHA 24 h / day private and will be reinstated by family. Patient seen and \par examined by Dr. Barahona and kaya and deemed s table for d/c with follow up. \par Patient has h/o of pafib in [place] 2 wks ago during UTI-- no AC per Maribeth as pt is \par advanced age/dementia and fall risk. On ASA 81. Instructed to return if \par symptoms worsen including not limited to CP, SOB, MEEK, palpitations, LOC, \par syncope. Extensive conversations had with family about increasing PO intake and \par hydration. \par Requested mucinex and flonase as needed for congestion at home and outpatient \par PT. \par \par 1/30/18 p/w clinically dry on exam, sinus heri and c/o dizziness in AM no opal syncope like previous episode no palp sob cp\par \par 5/17/18 brought in by family concern for dehydration\par location: near syncope\par duration: minutes\par  modifying factors: hydration\par timing: after diuretic\par severity: 8/10 \par \par 12/19/18 fell and had sacra fracture since last visit, has stopped torsemide for at least six months\par presents with meek dizziness, led edema\par ekg un changed

## 2018-12-19 NOTE — REASON FOR VISIT
[Follow-Up - Clinic] : a clinic follow-up of [Dyspnea] : dyspnea [Fatigue] : feeling tired (fatigue)

## 2018-12-19 NOTE — DISCUSSION/SUMMARY
[FreeTextEntry1] : The number of diagnostic and/or management options \par HfpEF - trace MR on echo on exam MR wilfredo than prior - likely 2/2 inc LVEDP - +JVD and + edema +HS reflex - recommend torsemide today tomorrow and likely qod prn symptoms\par \par HTN -  Continue torsemide 5 mg TIW for BP control, to improve LE edema, and to improve comfort.\par hold losartan allow permissive htn\par \par \par Labs, radiology: ekg unchanged\par \par Discussion of the case with another healthcare provider: mitch\par \par Overall Risk: High\par

## 2019-01-11 ENCOUNTER — TRANSCRIPTION ENCOUNTER (OUTPATIENT)
Age: 84
End: 2019-01-11

## 2019-01-30 ENCOUNTER — APPOINTMENT (OUTPATIENT)
Dept: HEART AND VASCULAR | Facility: CLINIC | Age: 84
End: 2019-01-30
Payer: MEDICARE

## 2019-01-30 VITALS
BODY MASS INDEX: 22.08 KG/M2 | DIASTOLIC BLOOD PRESSURE: 84 MMHG | HEART RATE: 69 BPM | SYSTOLIC BLOOD PRESSURE: 130 MMHG | WEIGHT: 120 LBS | HEIGHT: 61.81 IN | TEMPERATURE: 98 F | OXYGEN SATURATION: 95 %

## 2019-01-30 PROCEDURE — 93000 ELECTROCARDIOGRAM COMPLETE: CPT

## 2019-01-30 PROCEDURE — 99214 OFFICE O/P EST MOD 30 MIN: CPT

## 2019-01-31 NOTE — DISCUSSION/SUMMARY
[FreeTextEntry1] : The number of diagnostic and/or management options \par HTN -  Continue torsemide 5 mg TIW for BP control, to improve LE edema, and to improve comfort.\par hold losartan allow permissive htn\par \par \par Labs, radiology: ekg uinchanged\par \par Discussion of the case with another healthcare provider: orsher\par \par Overall Risk: High\par

## 2019-01-31 NOTE — HISTORY OF PRESENT ILLNESS
[FreeTextEntry1] : 89 y/o active female (lives alone w/24hour home health aid) w/ PMHx of HTN, \par HPL, Dementia, hx Cerebral Artery Occlusion, AS, pAfib (no AC, pt. new onset \par two weeks ago 2/2 to UTI/Pyelo in [place]; currently SR) and hx of recent \par hospitalization in [place], [place] for UTI/Pyelo and Sinusitis X 4days (tx \par with Abx) two weeks ago while on vacation with family, BIBA to [facility] ER this \par evening, 1/15/17, after witness syncope at restaurant with family, no prodrome \par symptoms reported.  Pt. and her family flew back today from [place] feeling \par fine until this evening while having dinner at restaurant.  According to \par patient's son, they were eating desert when his mother slumped over in chair, \par staring with mild shaking of hands, pt. was unresponsive for approximately one \par minute.  Pt. denies urinary or bowel incontinence, AMS afterwards. While on \par vacation in [place] this month pt. developed complicated UTI with Sinusitis \par requiring hospitalization for treatment with Abx two weeks ago.  Pt. was \par hospitalized for four days w/ IV abx  and discharged with oral Abx in which pt. \par recently finished. Pt. son reports pt.'s Cardiologist, who is affiliated with \[facility], told his mother she has AS, and has been monitoring her \par carefully. Pt. denies fever, chills, cp, SOB, abdominal discomfort and N/V.  In \par ER ECG reveals Sinus Heri @59bpm with non specific ST-T wave changes, CE \par negative X1, UA reveals UTI (repeat ordered), WBC normal, Blood Glucose 188, \par CXR no acute pathology seen, CTA of head w/o contrast reveals no acute \par hemorrhage or ischemic changes; sinusitis is seen.  Fluids were started in ER \par (NS 1 Liter run @75cc hour; stopped once pt. admitted to floor).  Pt. admitted \par to [facility] for telemetry, ECG, serial CE, Echocardiogram, repeat UA and urine Cx, \par  TSH and syncope workup.  Discuss plan with Dr. Stahl in AM. \par \par Upon admission patient had no events on tele, r/o ACS, ECG nonischemic, \par remained asymptomatic, and underwent echocardiogram 1/16 revealing Normal left \par ventricular size and wall thickness.The left ventricular wall motion is normal. \par The left ventricular ejection fraction is estimated to be 60-65%The left atrial \par size is normal. Right atrial size is normal. The right ventricle is mildly \par dilated. Structurally normal mitral valve. No mitral regurgitation noted. \par Structurally normal tricuspid valve. There is no echocardiographic evidence for \par pulmonary hypertension. Structurally normal pulmonic valve There is trace \par pulmonic regurgitation. No aortic root dilatation. The inferior vena cava is \par normal in size (<2.1 cm) with normal inspiratory collapse (>50%) consistent \par with normal right atrial pressure. There is no pericardial effusion. \par \par Underwent carotid US revealing < 50% plaque b/l carotid arteries as verbally \par read by radiologist. \par UA was repeated which was negative for UTI and pt asymptomatic. UCx sent  \silvio lance f/u as outpatient-- insignificant amt of growth. Remained afebrile \par WBC NL. \par Was hydrated overnight 60 cc/8hrs. Patient ambulated with family and PT. \par This AM 1/17 patient's labs WNL, lytes WNL, VSS, (HR ranged from 49-70s o/n \par asymptomatic while sleeping, d/w attending no intervention @ this time), \par asymptomatic, denies cp, sob, meek, palpitations, lightheadedness, syncope. She \par has HHA 24 h / day private and will be reinstated by family. Patient seen and \par examined by Dr. Barahona and kaya and deemed s table for d/c with follow up. \par Patient has h/o of pafib in [place] 2 wks ago during UTI-- no AC per Maribeth as pt is \par advanced age/dementia and fall risk. On ASA 81. Instructed to return if \par symptoms worsen including not limited to CP, SOB, MEEK, palpitations, LOC, \par syncope. Extensive conversations had with family about increasing PO intake and \par hydration. \par Requested mucinex and flonase as needed for congestion at home and outpatient \par PT. \par \par 1/30/18 p/w clinically dry on exam, sinus heri and c/o dizziness in AM no opal syncope like previous episode no palp sob cp\par \par 5/17/18 brought in by family concern for dehydration\par location: near syncope\par duration: minutes\par  modifying factors: hydration\par timing: after diuretic\par severity: 8/10 \par \par 12/19/18 fell and had sacra fracture since last visit, has stopped torsemide for at least six months\par presents with meek dizziness, led edema\par ekg un changed\par \par 1/30/19 taking torsemide iw per family markedly improved edema and mental status\par USOH, 100% compliant with meds\par location: chest\par duration: na\par  modifying factors: na\par timing: na\par severity: 0/10\par EKG unchanged from prior (in chart)\par consultation notes reviewed where appropriate\par

## 2019-01-31 NOTE — PHYSICAL EXAM
[General Appearance - Well Developed] : well developed [Normal Appearance] : normal appearance [Well Groomed] : well groomed [General Appearance - Well Nourished] : well nourished [No Deformities] : no deformities [General Appearance - In No Acute Distress] : no acute distress [Normal Conjunctiva] : the conjunctiva exhibited no abnormalities [Eyelids - No Xanthelasma] : the eyelids demonstrated no xanthelasmas [Normal Oral Mucosa] : normal oral mucosa [No Oral Pallor] : no oral pallor [No Oral Cyanosis] : no oral cyanosis [Normal Jugular Venous A Waves Present] : normal jugular venous A waves present [Normal Jugular Venous V Waves Present] : normal jugular venous V waves present [No Jugular Venous Martinez A Waves] : no jugular venous martinez A waves [Respiration, Rhythm And Depth] : normal respiratory rhythm and effort [Exaggerated Use Of Accessory Muscles For Inspiration] : no accessory muscle use [Auscultation Breath Sounds / Voice Sounds] : lungs were clear to auscultation bilaterally [Heart Rate And Rhythm] : heart rate and rhythm were normal [Heart Sounds] : normal S1 and S2 [FreeTextEntry1] : 3/6 [Abdomen Soft] : soft [Abdomen Tenderness] : non-tender [Abdomen Mass (___ Cm)] : no abdominal mass palpated [Abnormal Walk] : normal gait [Gait - Sufficient For Exercise Testing] : the gait was sufficient for exercise testing [Nail Clubbing] : no clubbing of the fingernails [Cyanosis, Localized] : no localized cyanosis [Petechial Hemorrhages (___cm)] : no petechial hemorrhages [Skin Color & Pigmentation] : normal skin color and pigmentation [] : no rash [No Venous Stasis] : no venous stasis [Skin Lesions] : no skin lesions [No Skin Ulcers] : no skin ulcer [No Xanthoma] : no  xanthoma was observed [Oriented To Time, Place, And Person] : oriented to person, place, and time [Affect] : the affect was normal [Mood] : the mood was normal [No Anxiety] : not feeling anxious

## 2019-04-11 ENCOUNTER — APPOINTMENT (OUTPATIENT)
Dept: HEART AND VASCULAR | Facility: CLINIC | Age: 84
End: 2019-04-11
Payer: MEDICARE

## 2019-04-11 VITALS
OXYGEN SATURATION: 90 % | HEIGHT: 61.81 IN | HEART RATE: 64 BPM | DIASTOLIC BLOOD PRESSURE: 80 MMHG | TEMPERATURE: 97.5 F | SYSTOLIC BLOOD PRESSURE: 120 MMHG | BODY MASS INDEX: 21.34 KG/M2 | WEIGHT: 115.99 LBS

## 2019-04-11 PROCEDURE — 99215 OFFICE O/P EST HI 40 MIN: CPT

## 2019-04-11 PROCEDURE — 93000 ELECTROCARDIOGRAM COMPLETE: CPT

## 2019-04-15 NOTE — PHYSICAL EXAM
[General Appearance - Well Developed] : well developed [Normal Appearance] : normal appearance [General Appearance - Well Nourished] : well nourished [Well Groomed] : well groomed [No Deformities] : no deformities [General Appearance - In No Acute Distress] : no acute distress [Eyelids - No Xanthelasma] : the eyelids demonstrated no xanthelasmas [Normal Conjunctiva] : the conjunctiva exhibited no abnormalities [Normal Oral Mucosa] : normal oral mucosa [No Oral Pallor] : no oral pallor [Normal Jugular Venous A Waves Present] : normal jugular venous A waves present [No Oral Cyanosis] : no oral cyanosis [Normal Jugular Venous V Waves Present] : normal jugular venous V waves present [No Jugular Venous Martinez A Waves] : no jugular venous martinez A waves [Exaggerated Use Of Accessory Muscles For Inspiration] : no accessory muscle use [Respiration, Rhythm And Depth] : normal respiratory rhythm and effort [Auscultation Breath Sounds / Voice Sounds] : lungs were clear to auscultation bilaterally [Heart Rate And Rhythm] : heart rate and rhythm were normal [Heart Sounds] : normal S1 and S2 [FreeTextEntry1] : 3/6 [Abdomen Soft] : soft [Abdomen Tenderness] : non-tender [Abnormal Walk] : normal gait [Abdomen Mass (___ Cm)] : no abdominal mass palpated [Nail Clubbing] : no clubbing of the fingernails [Gait - Sufficient For Exercise Testing] : the gait was sufficient for exercise testing [Cyanosis, Localized] : no localized cyanosis [Petechial Hemorrhages (___cm)] : no petechial hemorrhages [No Venous Stasis] : no venous stasis [Skin Color & Pigmentation] : normal skin color and pigmentation [] : no rash [No Skin Ulcers] : no skin ulcer [No Xanthoma] : no  xanthoma was observed [Skin Lesions] : no skin lesions [Affect] : the affect was normal [Oriented To Time, Place, And Person] : oriented to person, place, and time [Mood] : the mood was normal [No Anxiety] : not feeling anxious

## 2019-04-15 NOTE — HISTORY OF PRESENT ILLNESS
[FreeTextEntry1] : 89 y/o active female (lives alone w/24hour home health aid) w/ PMHx of HTN, \par HPL, Dementia, hx Cerebral Artery Occlusion, AS, pAfib (no AC, pt. new onset \par two weeks ago 2/2 to UTI/Pyelo in [place]; currently SR) and hx of recent \par hospitalization in [place], [place] for UTI/Pyelo and Sinusitis X 4days (tx \par with Abx) two weeks ago while on vacation with family, BIBA to [facility] ER this \par evening, 1/15/17, after witness syncope at restaurant with family, no prodrome \par symptoms reported.  Pt. and her family flew back today from [place] feeling \par fine until this evening while having dinner at restaurant.  According to \par patient's son, they were eating desert when his mother slumped over in chair, \par staring with mild shaking of hands, pt. was unresponsive for approximately one \par minute.  Pt. denies urinary or bowel incontinence, AMS afterwards. While on \par vacation in [place] this month pt. developed complicated UTI with Sinusitis \par requiring hospitalization for treatment with Abx two weeks ago.  Pt. was \par hospitalized for four days w/ IV abx  and discharged with oral Abx in which pt. \par recently finished. Pt. son reports pt.'s Cardiologist, who is affiliated with \[facility], told his mother she has AS, and has been monitoring her \par carefully. Pt. denies fever, chills, cp, SOB, abdominal discomfort and N/V.  In \par ER ECG reveals Sinus Heri @59bpm with non specific ST-T wave changes, CE \par negative X1, UA reveals UTI (repeat ordered), WBC normal, Blood Glucose 188, \par CXR no acute pathology seen, CTA of head w/o contrast reveals no acute \par hemorrhage or ischemic changes; sinusitis is seen.  Fluids were started in ER \par (NS 1 Liter run @75cc hour; stopped once pt. admitted to floor).  Pt. admitted \par to [facility] for telemetry, ECG, serial CE, Echocardiogram, repeat UA and urine Cx, \par  TSH and syncope workup.  Discuss plan with Dr. Stahl in AM. \par \par Upon admission patient had no events on tele, r/o ACS, ECG nonischemic, \par remained asymptomatic, and underwent echocardiogram 1/16 revealing Normal left \par ventricular size and wall thickness.The left ventricular wall motion is normal. \par The left ventricular ejection fraction is estimated to be 60-65%The left atrial \par size is normal. Right atrial size is normal. The right ventricle is mildly \par dilated. Structurally normal mitral valve. No mitral regurgitation noted. \par Structurally normal tricuspid valve. There is no echocardiographic evidence for \par pulmonary hypertension. Structurally normal pulmonic valve There is trace \par pulmonic regurgitation. No aortic root dilatation. The inferior vena cava is \par normal in size (<2.1 cm) with normal inspiratory collapse (>50%) consistent \par with normal right atrial pressure. There is no pericardial effusion. \par \par Underwent carotid US revealing < 50% plaque b/l carotid arteries as verbally \par read by radiologist. \par UA was repeated which was negative for UTI and pt asymptomatic. UCx sent  \silvio lance f/u as outpatient-- insignificant amt of growth. Remained afebrile \par WBC NL. \par Was hydrated overnight 60 cc/8hrs. Patient ambulated with family and PT. \par This AM 1/17 patient's labs WNL, lytes WNL, VSS, (HR ranged from 49-70s o/n \par asymptomatic while sleeping, d/w attending no intervention @ this time), \par asymptomatic, denies cp, sob, meek, palpitations, lightheadedness, syncope. She \par has HHA 24 h / day private and will be reinstated by family. Patient seen and \par examined by Dr. Barahona and kaya and deemed s table for d/c with follow up. \par Patient has h/o of pafib in [place] 2 wks ago during UTI-- no AC per Maribeth as pt is \par advanced age/dementia and fall risk. On ASA 81. Instructed to return if \par symptoms worsen including not limited to CP, SOB, MEEK, palpitations, LOC, \par syncope. Extensive conversations had with family about increasing PO intake and \par hydration. \par Requested mucinex and flonase as needed for congestion at home and outpatient \par PT. \par \par 1/30/18 p/w clinically dry on exam, sinus heri and c/o dizziness in AM no opal syncope like previous episode no palp sob cp\par \par 5/17/18 brought in by family concern for dehydration\par location: near syncope\par duration: minutes\par  modifying factors: hydration\par timing: after diuretic\par severity: 8/10 \par \par 12/19/18 fell and had sacra fracture since last visit, has stopped torsemide for at least six months\par presents with meek dizziness, led edema\par ekg un changed\par \par 1/30/19 taking torsemide iw per family markedly improved edema and mental status\par USOH, 100% compliant with meds\par location: chest\par duration: na\par  modifying factors: na\par timing: na\par severity: 0/10\par EKG unchanged from prior (in chart)\par consultation notes reviewed where appropriate\par \par 4/11/19 USOH, dry weight EKG unchanges\par

## 2019-06-04 ENCOUNTER — TRANSCRIPTION ENCOUNTER (OUTPATIENT)
Age: 84
End: 2019-06-04

## 2019-06-07 ENCOUNTER — APPOINTMENT (OUTPATIENT)
Dept: VASCULAR SURGERY | Facility: CLINIC | Age: 84
End: 2019-06-07
Payer: MEDICARE

## 2019-06-07 VITALS — DIASTOLIC BLOOD PRESSURE: 84 MMHG | HEART RATE: 81 BPM | SYSTOLIC BLOOD PRESSURE: 144 MMHG | OXYGEN SATURATION: 86 %

## 2019-06-07 PROCEDURE — 99203 OFFICE O/P NEW LOW 30 MIN: CPT

## 2019-06-13 NOTE — HISTORY OF PRESENT ILLNESS
[FreeTextEntry1] : pt comes in acutely for left arm hematoma\par not expanding\par size of a walnut\par not painful\par no arm swelling\par

## 2019-06-13 NOTE — PHYSICAL EXAM
[2+] : left 2+ [FreeTextEntry1] : small hematoma walnut sized\par  [de-identified] : not talkative, here with family

## 2019-06-27 ENCOUNTER — APPOINTMENT (OUTPATIENT)
Dept: HEART AND VASCULAR | Facility: CLINIC | Age: 84
End: 2019-06-27
Payer: MEDICARE

## 2019-06-27 VITALS
HEIGHT: 61.81 IN | OXYGEN SATURATION: 96 % | HEART RATE: 78 BPM | SYSTOLIC BLOOD PRESSURE: 100 MMHG | TEMPERATURE: 98 F | DIASTOLIC BLOOD PRESSURE: 60 MMHG

## 2019-06-27 PROCEDURE — 93000 ELECTROCARDIOGRAM COMPLETE: CPT

## 2019-06-27 PROCEDURE — 99215 OFFICE O/P EST HI 40 MIN: CPT

## 2019-07-01 NOTE — PHYSICAL EXAM
[General Appearance - Well Developed] : well developed [Well Groomed] : well groomed [Normal Appearance] : normal appearance [General Appearance - Well Nourished] : well nourished [No Deformities] : no deformities [General Appearance - In No Acute Distress] : no acute distress [Eyelids - No Xanthelasma] : the eyelids demonstrated no xanthelasmas [Normal Conjunctiva] : the conjunctiva exhibited no abnormalities [No Oral Pallor] : no oral pallor [Normal Oral Mucosa] : normal oral mucosa [No Oral Cyanosis] : no oral cyanosis [Normal Jugular Venous A Waves Present] : normal jugular venous A waves present [Normal Jugular Venous V Waves Present] : normal jugular venous V waves present [No Jugular Venous Martinez A Waves] : no jugular venous martinez A waves [Exaggerated Use Of Accessory Muscles For Inspiration] : no accessory muscle use [Respiration, Rhythm And Depth] : normal respiratory rhythm and effort [Auscultation Breath Sounds / Voice Sounds] : lungs were clear to auscultation bilaterally [Heart Rate And Rhythm] : heart rate and rhythm were normal [Heart Sounds] : normal S1 and S2 [Abdomen Soft] : soft [Abdomen Tenderness] : non-tender [Abdomen Mass (___ Cm)] : no abdominal mass palpated [Gait - Sufficient For Exercise Testing] : the gait was sufficient for exercise testing [Abnormal Walk] : normal gait [Nail Clubbing] : no clubbing of the fingernails [Cyanosis, Localized] : no localized cyanosis [Petechial Hemorrhages (___cm)] : no petechial hemorrhages [Skin Color & Pigmentation] : normal skin color and pigmentation [] : no rash [No Venous Stasis] : no venous stasis [Skin Lesions] : no skin lesions [No Skin Ulcers] : no skin ulcer [No Xanthoma] : no  xanthoma was observed [Affect] : the affect was normal [Oriented To Time, Place, And Person] : oriented to person, place, and time [Mood] : the mood was normal [No Anxiety] : not feeling anxious [FreeTextEntry1] : 3/6

## 2019-07-01 NOTE — HISTORY OF PRESENT ILLNESS
[FreeTextEntry1] : 89 y/o active female (lives alone w/24hour home health aid) w/ PMHx of HTN, \par HPL, Dementia, hx Cerebral Artery Occlusion, AS, pAfib (no AC, pt. new onset \par two weeks ago 2/2 to UTI/Pyelo in [place]; currently SR) and hx of recent \par hospitalization in [place], [place] for UTI/Pyelo and Sinusitis X 4days (tx \par with Abx) two weeks ago while on vacation with family, BIBA to [facility] ER this \par evening, 1/15/17, after witness syncope at restaurant with family, no prodrome \par symptoms reported.  Pt. and her family flew back today from [place] feeling \par fine until this evening while having dinner at restaurant.  According to \par patient's son, they were eating desert when his mother slumped over in chair, \par staring with mild shaking of hands, pt. was unresponsive for approximately one \par minute.  Pt. denies urinary or bowel incontinence, AMS afterwards. While on \par vacation in [place] this month pt. developed complicated UTI with Sinusitis \par requiring hospitalization for treatment with Abx two weeks ago.  Pt. was \par hospitalized for four days w/ IV abx  and discharged with oral Abx in which pt. \par recently finished. Pt. son reports pt.'s Cardiologist, who is affiliated with \[facility], told his mother she has AS, and has been monitoring her \par carefully. Pt. denies fever, chills, cp, SOB, abdominal discomfort and N/V.  In \par ER ECG reveals Sinus Heri @59bpm with non specific ST-T wave changes, CE \par negative X1, UA reveals UTI (repeat ordered), WBC normal, Blood Glucose 188, \par CXR no acute pathology seen, CTA of head w/o contrast reveals no acute \par hemorrhage or ischemic changes; sinusitis is seen.  Fluids were started in ER \par (NS 1 Liter run @75cc hour; stopped once pt. admitted to floor).  Pt. admitted \par to [facility] for telemetry, ECG, serial CE, Echocardiogram, repeat UA and urine Cx, \par  TSH and syncope workup.  Discuss plan with Dr. Stahl in AM. \par \par Upon admission patient had no events on tele, r/o ACS, ECG nonischemic, \par remained asymptomatic, and underwent echocardiogram 1/16 revealing Normal left \par ventricular size and wall thickness.The left ventricular wall motion is normal. \par The left ventricular ejection fraction is estimated to be 60-65%The left atrial \par size is normal. Right atrial size is normal. The right ventricle is mildly \par dilated. Structurally normal mitral valve. No mitral regurgitation noted. \par Structurally normal tricuspid valve. There is no echocardiographic evidence for \par pulmonary hypertension. Structurally normal pulmonic valve There is trace \par pulmonic regurgitation. No aortic root dilatation. The inferior vena cava is \par normal in size (<2.1 cm) with normal inspiratory collapse (>50%) consistent \par with normal right atrial pressure. There is no pericardial effusion. \par \par Underwent carotid US revealing < 50% plaque b/l carotid arteries as verbally \par read by radiologist. \par UA was repeated which was negative for UTI and pt asymptomatic. UCx sent  \silvoi lance f/u as outpatient-- insignificant amt of growth. Remained afebrile \par WBC NL. \par Was hydrated overnight 60 cc/8hrs. Patient ambulated with family and PT. \par This AM 1/17 patient's labs WNL, lytes WNL, VSS, (HR ranged from 49-70s o/n \par asymptomatic while sleeping, d/w attending no intervention @ this time), \par asymptomatic, denies cp, sob, meek, palpitations, lightheadedness, syncope. She \par has HHA 24 h / day private and will be reinstated by family. Patient seen and \par examined by Dr. Barahona and akya and deemed s table for d/c with follow up. \par Patient has h/o of pafib in [place] 2 wks ago during UTI-- no AC per Maribeth as pt is \par advanced age/dementia and fall risk. On ASA 81. Instructed to return if \par symptoms worsen including not limited to CP, SOB, MEEK, palpitations, LOC, \par syncope. Extensive conversations had with family about increasing PO intake and \par hydration. \par Requested mucinex and flonase as needed for congestion at home and outpatient \par PT. \par \par 1/30/18 p/w clinically dry on exam, sinus heri and c/o dizziness in AM no opal syncope like previous episode no palp sob cp\par \par 5/17/18 brought in by family concern for dehydration\par location: near syncope\par duration: minutes\par  modifying factors: hydration\par timing: after diuretic\par severity: 8/10 \par \par 12/19/18 fell and had sacra fracture since last visit, has stopped torsemide for at least six months\par presents with meek dizziness, led edema\par ekg un changed\par \par 1/30/19 taking torsemide iw per family markedly improved edema and mental status\par USOH, 100% compliant with meds\par location: chest\par duration: na\par  modifying factors: na\par timing: na\par severity: 0/10\par EKG unchanged from prior (in chart)\par consultation notes reviewed where appropriate\par \par 4/11/19 USOH, dry weight EKG unchanged\par \par 6/27/19 had his surgery at [facility] s complications, eucolvemic on exam today taking toresmide tid +prn per family\par

## 2019-07-01 NOTE — DISCUSSION/SUMMARY
[FreeTextEntry1] : The number of diagnostic and/or management options \par HTN -  Continue torsemide 5 mg TIW for BP control, to improve LE edema, and to improve comfort.\par hold losartan allow permissive htn\par extensive >40 min discussion w family at bedside\par \par Labs, radiology: ekg unchanged\par \par Overall Risk: High complexity\par

## 2019-08-10 ENCOUNTER — EMERGENCY (EMERGENCY)
Facility: HOSPITAL | Age: 84
LOS: 1 days | Discharge: HOME | End: 2019-08-10
Attending: EMERGENCY MEDICINE | Admitting: EMERGENCY MEDICINE
Payer: MEDICARE

## 2019-08-10 VITALS
SYSTOLIC BLOOD PRESSURE: 107 MMHG | HEART RATE: 73 BPM | OXYGEN SATURATION: 99 % | TEMPERATURE: 99 F | HEIGHT: 62 IN | RESPIRATION RATE: 20 BRPM | DIASTOLIC BLOOD PRESSURE: 66 MMHG | WEIGHT: 134.92 LBS

## 2019-08-10 VITALS
RESPIRATION RATE: 16 BRPM | SYSTOLIC BLOOD PRESSURE: 148 MMHG | TEMPERATURE: 98 F | OXYGEN SATURATION: 97 % | HEART RATE: 76 BPM | DIASTOLIC BLOOD PRESSURE: 68 MMHG

## 2019-08-10 LAB
ALBUMIN SERPL ELPH-MCNC: 3.3 G/DL — SIGNIFICANT CHANGE UP (ref 3.3–5)
ALP SERPL-CCNC: 98 U/L — SIGNIFICANT CHANGE UP (ref 40–120)
ALT FLD-CCNC: 10 U/L — SIGNIFICANT CHANGE UP (ref 10–45)
ANION GAP SERPL CALC-SCNC: 8 MMOL/L — SIGNIFICANT CHANGE UP (ref 5–17)
APPEARANCE UR: CLEAR — SIGNIFICANT CHANGE UP
AST SERPL-CCNC: 14 U/L — SIGNIFICANT CHANGE UP (ref 10–40)
BASOPHILS # BLD AUTO: 0.04 K/UL — SIGNIFICANT CHANGE UP (ref 0–0.2)
BASOPHILS NFR BLD AUTO: 0.7 % — SIGNIFICANT CHANGE UP (ref 0–2)
BILIRUB SERPL-MCNC: 0.3 MG/DL — SIGNIFICANT CHANGE UP (ref 0.2–1.2)
BILIRUB UR-MCNC: NEGATIVE — SIGNIFICANT CHANGE UP
BUN SERPL-MCNC: 22 MG/DL — SIGNIFICANT CHANGE UP (ref 7–23)
CALCIUM SERPL-MCNC: 9.1 MG/DL — SIGNIFICANT CHANGE UP (ref 8.4–10.5)
CHLORIDE SERPL-SCNC: 100 MMOL/L — SIGNIFICANT CHANGE UP (ref 96–108)
CO2 SERPL-SCNC: 30 MMOL/L — SIGNIFICANT CHANGE UP (ref 22–31)
COLOR SPEC: YELLOW — SIGNIFICANT CHANGE UP
CREAT SERPL-MCNC: 1.06 MG/DL — SIGNIFICANT CHANGE UP (ref 0.5–1.3)
DIFF PNL FLD: NEGATIVE — SIGNIFICANT CHANGE UP
EOSINOPHIL # BLD AUTO: 0.11 K/UL — SIGNIFICANT CHANGE UP (ref 0–0.5)
EOSINOPHIL NFR BLD AUTO: 1.9 % — SIGNIFICANT CHANGE UP (ref 0–6)
GLUCOSE SERPL-MCNC: 94 MG/DL — SIGNIFICANT CHANGE UP (ref 70–99)
GLUCOSE UR QL: NEGATIVE — SIGNIFICANT CHANGE UP
HCT VFR BLD CALC: 32 % — LOW (ref 34.5–45)
HGB BLD-MCNC: 10.2 G/DL — LOW (ref 11.5–15.5)
IMM GRANULOCYTES NFR BLD AUTO: 0.2 % — SIGNIFICANT CHANGE UP (ref 0–1.5)
KETONES UR-MCNC: NEGATIVE — SIGNIFICANT CHANGE UP
LEUKOCYTE ESTERASE UR-ACNC: NEGATIVE — SIGNIFICANT CHANGE UP
LYMPHOCYTES # BLD AUTO: 1.25 K/UL — SIGNIFICANT CHANGE UP (ref 1–3.3)
LYMPHOCYTES # BLD AUTO: 22 % — SIGNIFICANT CHANGE UP (ref 13–44)
MCHC RBC-ENTMCNC: 30 PG — SIGNIFICANT CHANGE UP (ref 27–34)
MCHC RBC-ENTMCNC: 31.9 GM/DL — LOW (ref 32–36)
MCV RBC AUTO: 94.1 FL — SIGNIFICANT CHANGE UP (ref 80–100)
MONOCYTES # BLD AUTO: 0.76 K/UL — SIGNIFICANT CHANGE UP (ref 0–0.9)
MONOCYTES NFR BLD AUTO: 13.4 % — SIGNIFICANT CHANGE UP (ref 2–14)
NEUTROPHILS # BLD AUTO: 3.5 K/UL — SIGNIFICANT CHANGE UP (ref 1.8–7.4)
NEUTROPHILS NFR BLD AUTO: 61.8 % — SIGNIFICANT CHANGE UP (ref 43–77)
NITRITE UR-MCNC: NEGATIVE — SIGNIFICANT CHANGE UP
NRBC # BLD: 0 /100 WBCS — SIGNIFICANT CHANGE UP (ref 0–0)
PH UR: 7 — SIGNIFICANT CHANGE UP (ref 5–8)
PLATELET # BLD AUTO: 276 K/UL — SIGNIFICANT CHANGE UP (ref 150–400)
POTASSIUM SERPL-MCNC: 4.6 MMOL/L — SIGNIFICANT CHANGE UP (ref 3.5–5.3)
POTASSIUM SERPL-SCNC: 4.6 MMOL/L — SIGNIFICANT CHANGE UP (ref 3.5–5.3)
PROT SERPL-MCNC: 6.8 G/DL — SIGNIFICANT CHANGE UP (ref 6–8.3)
PROT UR-MCNC: NEGATIVE MG/DL — SIGNIFICANT CHANGE UP
RBC # BLD: 3.4 M/UL — LOW (ref 3.8–5.2)
RBC # FLD: 13.8 % — SIGNIFICANT CHANGE UP (ref 10.3–14.5)
SODIUM SERPL-SCNC: 138 MMOL/L — SIGNIFICANT CHANGE UP (ref 135–145)
SP GR SPEC: 1.01 — SIGNIFICANT CHANGE UP (ref 1–1.03)
UROBILINOGEN FLD QL: 0.2 E.U./DL — SIGNIFICANT CHANGE UP
WBC # BLD: 5.67 K/UL — SIGNIFICANT CHANGE UP (ref 3.8–10.5)
WBC # FLD AUTO: 5.67 K/UL — SIGNIFICANT CHANGE UP (ref 3.8–10.5)

## 2019-08-10 PROCEDURE — 73502 X-RAY EXAM HIP UNI 2-3 VIEWS: CPT | Mod: 26,LT

## 2019-08-10 PROCEDURE — 73610 X-RAY EXAM OF ANKLE: CPT | Mod: 26,LT

## 2019-08-10 PROCEDURE — 93010 ELECTROCARDIOGRAM REPORT: CPT

## 2019-08-10 PROCEDURE — 73630 X-RAY EXAM OF FOOT: CPT | Mod: 26,LT

## 2019-08-10 PROCEDURE — 72170 X-RAY EXAM OF PELVIS: CPT | Mod: 26,59

## 2019-08-10 PROCEDURE — 99284 EMERGENCY DEPT VISIT MOD MDM: CPT

## 2019-08-10 RX ORDER — SODIUM CHLORIDE 9 MG/ML
500 INJECTION INTRAMUSCULAR; INTRAVENOUS; SUBCUTANEOUS ONCE
Refills: 0 | Status: DISCONTINUED | OUTPATIENT
Start: 2019-08-10 | End: 2019-08-10

## 2019-08-10 RX ORDER — ACETAMINOPHEN 500 MG
650 TABLET ORAL ONCE
Refills: 0 | Status: COMPLETED | OUTPATIENT
Start: 2019-08-10 | End: 2019-08-10

## 2019-08-10 NOTE — ED ADULT NURSE REASSESSMENT NOTE - NS ED NURSE REASSESS COMMENT FT1
small abrasion on the left lower leg, cleaned and dressed, Ace bandage reapplied to left ankle and foot. Hard shoe placed on left foot.

## 2019-08-10 NOTE — ED PROVIDER NOTE - OBJECTIVE STATEMENT
89 y/o Female with severe dementia, HTN, HLD, paroxsymal AFib presents with for evaluation for lethargy and left lower extremity swelling as noted by family members. Pt has a 24 hour aide at home, who states she has no known trauma. Unable to obtain history from patient secondary to dementia, history obtained from family and aide. Pt with good appetite, but no fever, no chills, no N/V/D. Pt with a left hip replacement this April, healed well. She usually ambulates with a walker.

## 2019-08-10 NOTE — ED ADULT TRIAGE NOTE - CHIEF COMPLAINT QUOTE
Patient, PMHx of dementia, per family patient has increasing lethargy, increasing agitation, increasing urinary frequency and left ankle swelling, starting today.

## 2019-08-10 NOTE — ED PROVIDER NOTE - CARE PROVIDER_API CALL
Jorje Hale (ALLEGRAM)  Podiatric Medicine and Surgery  [address], [address]  [place] [address]  Phone: [phone]  Fax: [phone]  Follow Up Time:

## 2019-08-10 NOTE — ED PROVIDER NOTE - CONSTITUTIONAL, MLM
normal... Well appearing, well nourished, awake, alert. At times seem to be agitated and uncooperative which is patient's baseline.

## 2019-08-10 NOTE — ED ADULT NURSE NOTE - OBJECTIVE STATEMENT
bib family c/c pt is agitated today, baseline is alert, recognizes people, answers appropriately (hx dementia), in ER pt is agitated, follows a few verbal commands. Also today the family noted L leg swelling and small red spot on L calf of unknown origin. non pitting edema of LLE noted, no erythema, temp of LE warm and equal b/l, pulses present b/l equal. no signs of pain noted at rest and on pressure. Family states pt had similar episodes of agitated behaviour previously and dx with UTI. bib family c/c pt is agitated today, baseline is alert, recognizes people, answers appropriately (hx dementia), in ER pt is agitated, follows a few verbal commands. Also today the family noted L leg swelling and laceration 1 cm diameter on L calf of unknown origin. non pitting edema of LLE noted, no erythema, temp of LE warm and equal b/l, pulses present b/l equal. no signs of pain noted at rest and on pressure. Family states pt had similar episodes of agitated behaviour previously and dx with UTI.

## 2019-08-10 NOTE — ED PROVIDER NOTE - CLINICAL SUMMARY MEDICAL DECISION MAKING FREE TEXT BOX
89 y/o Female with severe dementia coming in for lethargy and left leg swelling. Will order basic labs including UA. UA WNL, bedside US negative for DVT. will obtain xray to r/o Fx. 89 y/o Female with severe dementia coming in for lethargy and left leg swelling. Will order basic labs including UA. UA WNL, bedside US negative for DVT. will obtain xray to r/o Fx.  XRay foot with base of 5th metatarsal fx - placed in bulky shoes and post op shoe.  Discussed with Umang Weinstein for follow up.

## 2019-08-10 NOTE — ED PROVIDER NOTE - NSFOLLOWUPINSTRUCTIONS_ED_ALL_ED_FT
Follow up with Dr. Weinstein info below.    Use tylenol for pain.    Keep foot in bulky splint with shoe.    Metatarsal Fracture  ImageA metatarsal fracture is a break in one of the five bones that connect the toes to the rest of the foot. This may also be called a forefoot fracture. A metatarsal fracture may be:  A crack in the surface of the bone (stress fracture). This often occurs in athletes.  A break all the way through the bone (complete fracture).  The bone that connects to the little toe (fifth metatarsal) is most commonly fractured. Ballet dancers often fracture this bone.    What are the causes?  A metatarsal fracture may be caused by:  Sudden twisting of the foot.  Falling onto the foot.  Something heavy falling onto the foot.  Overuse or repetitive exercise.  What increases the risk?  This condition is more likely to develop in people who:  Play contact sports.  Do ballet.  Have a condition that causes the bones to become thin and brittle (osteoporosis).  Have a low calcium level.  What are the signs or symptoms?  Symptoms of this condition include:  Pain that gets worse when walking or standing.  Pain when pressing on the foot or moving the toes.  Swelling.  Bruising on the top or bottom of the foot.  How is this diagnosed?  This condition may be diagnosed based on:  Your symptoms.   Any recent foot injuries you have had.   A physical exam.  An X-ray of your foot. If you have a stress fracture, it may not show up on an X-ray, and you may need other imaging tests, such as:   A bone scan.  CT scan.   MRI.  How is this treated?  Treatment depends on how severe your fracture is and how the pieces of the broken bone line up with each other (alignment). Treatment may involve:  Wearing a cast, splint, or supportive boot on your foot.  Using crutches, and not putting any weight on your foot.  Having surgery to align broken bones (open reduction and internal fixation, ORIF).  Physical therapy.  Follow-up visits and X-rays to make sure you are healing.  Follow these instructions at home:  If you have a splint or a supportive boot:     Wear the splint or boot as told by your health care provider. Remove it only as told by your health care provider.  Loosen the splint or boot if your toes tingle, become numb, or turn cold and blue.   Keep the splint or boot clean.   If your splint or boot is not waterproof:   Do not let it get wet.   Cover it with a watertight covering when you take a bath or a shower.  If you have a cast:     Do not stick anything inside the cast to scratch your skin. Doing that increases your risk for infection.  Check the skin around the cast every day. Tell your health care provider about any concerns.  You may put lotion on dry skin around the edges of the cast. Do not put lotion on the skin underneath the cast.  Keep the cast clean.  If the cast is not waterproof:   Do not let it get wet.  Cover it with a watertight covering when you take a bath or a shower.  Activity     Do not use your affected leg to support your body weight until your health care provider says that you can. Use crutches as directed.  Ask your health care provider what activities are safe for you during recovery, and ask what activities you need to avoid.  Do physical therapy exercises as directed.  Driving     Do not drive or use heavy machinery while taking pain medicine.  Do not drive while wearing a cast, splint, or boot on a foot that you use for driving.  Managing pain, stiffness, and swelling     Image   If directed, put ice on painful areas:  Put ice in a plastic bag.  Place a towel between your skin and the bag.  If you have a removable splint or boot, remove it as told by your health care provider.  If you have a cast, place a towel between your cast and the bag.  Leave the ice on for 20 minutes, 2–3 times a day.  Move your toes often to avoid stiffness and to lessen swelling.  Raise (elevate) your lower leg above the level of your heart while you are sitting or lying down.  General instructions     Do not put pressure on any part of the cast or splint until it is fully hardened. This may take several hours.  Take over-the-counter and prescription medicines only as told by your health care provider.  Do not use any products that contain nicotine or tobacco, such as cigarettes and e-cigarettes. These can delay bone healing. If you need help quitting, ask your health care provider.  Do not take baths, swim, or use a hot tub until your health care provider approves. Ask your health care provider if you may take showers.  Keep all follow-up visits as told by your health care provider. This is important.  Contact a health care provider if you have:  Pain that gets worse or does not get better with medicine.   A fever.  A bad smell coming from your cast or splint.  Get help right away if you have:  Any of the following in your toes or your foot, even after loosening your splint (if applicable):   Numbness.   Tingling.   Coldness.  Blue skin.   Redness or swelling that gets worse.  Pain that suddenly becomes severe.  Summary  A metatarsal fracture is a break in one of the five bones that connect the toes to the rest of the foot.  Treatment depends on how severe your fracture is and how the pieces of the broken bone line up with each other (alignment). This may include wearing a cast, splint, or supportive boot, or using crutches. Sometimes surgery is needed to align the bones.  Ice and elevate your foot to help lessen the pain and swelling.  Make sure you know what symptoms should cause you to get help right away.  This information is not intended to replace advice given to you by your health care provider. Make sure you discuss any questions you have with your health care provider.    Document Released: 09/09/2003 Document Revised: 01/14/2019 Document Reviewed: 01/14/2019  [employer] Patient Education © 2019 [employer]

## 2019-08-10 NOTE — ED PROVIDER NOTE - MUSCULOSKELETAL, MLM
LLE: diffuse swelling, more than RLE. Swelling more to the ankle and foot area with scattered ecchymosis, 2+ DP pulses and well profused. No calf tenderness. RLE: Mild swelling with scattered ecchymosis below the knee. No obvious deformities.

## 2019-08-11 PROCEDURE — 28470 CLTX METATARSAL FX WO MNP EA: CPT | Mod: LT

## 2019-08-11 PROCEDURE — 99284 EMERGENCY DEPT VISIT MOD MDM: CPT | Mod: 25

## 2019-08-11 PROCEDURE — 36415 COLL VENOUS BLD VENIPUNCTURE: CPT

## 2019-08-11 PROCEDURE — 73630 X-RAY EXAM OF FOOT: CPT

## 2019-08-11 PROCEDURE — 83880 ASSAY OF NATRIURETIC PEPTIDE: CPT

## 2019-08-11 PROCEDURE — 93971 EXTREMITY STUDY: CPT

## 2019-08-11 PROCEDURE — 93005 ELECTROCARDIOGRAM TRACING: CPT | Mod: XU

## 2019-08-11 PROCEDURE — 85025 COMPLETE CBC W/AUTO DIFF WBC: CPT

## 2019-08-11 PROCEDURE — 81003 URINALYSIS AUTO W/O SCOPE: CPT

## 2019-08-11 PROCEDURE — 93971 EXTREMITY STUDY: CPT | Mod: 26,LT

## 2019-08-11 PROCEDURE — 73610 X-RAY EXAM OF ANKLE: CPT

## 2019-08-11 PROCEDURE — 80053 COMPREHEN METABOLIC PANEL: CPT

## 2019-08-11 PROCEDURE — 72170 X-RAY EXAM OF PELVIS: CPT

## 2019-08-11 PROCEDURE — 87086 URINE CULTURE/COLONY COUNT: CPT

## 2019-08-11 PROCEDURE — 73502 X-RAY EXAM HIP UNI 2-3 VIEWS: CPT

## 2019-08-12 LAB
CULTURE RESULTS: NO GROWTH — SIGNIFICANT CHANGE UP
SPECIMEN SOURCE: SIGNIFICANT CHANGE UP
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
## 2019-08-14 DIAGNOSIS — R53.83 OTHER FATIGUE: ICD-10-CM

## 2019-08-14 DIAGNOSIS — E78.5 HYPERLIPIDEMIA, UNSPECIFIED: ICD-10-CM

## 2019-08-14 DIAGNOSIS — S92.352A DISPLACED FRACTURE OF FIFTH METATARSAL BONE, LEFT FOOT, INITIAL ENCOUNTER FOR CLOSED FRACTURE: ICD-10-CM
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
## 2019-08-14 DIAGNOSIS — R60.0 LOCALIZED EDEMA: ICD-10-CM
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
## 2019-08-14 DIAGNOSIS — Z79.899 OTHER LONG TERM (CURRENT) DRUG THERAPY: ICD-10-CM

## 2019-08-21 NOTE — ED PROVIDER NOTE - DISPOSITION TYPE
ADMIT Scc Moderately Differentiated Histology Text: There are irregular masses of epidermal cells in the upper dermis.  Within the tumor masses, there are varying proportions of normal squamous cells and anaplastic squamous cells.  The anaplastic cells have variation in size and shape with hyperplasia and hyperchromasia of the nuclei, absence of intracellular bridges and varying degrees of keratinization.  A moderately differentiated pattern is noted.

## 2019-10-21 ENCOUNTER — FORM ENCOUNTER (OUTPATIENT)
Age: 84
End: 2019-10-21

## 2019-10-22 ENCOUNTER — APPOINTMENT (OUTPATIENT)
Dept: ORTHOPEDIC SURGERY | Facility: CLINIC | Age: 84
End: 2019-10-22
Payer: MEDICARE

## 2019-10-22 ENCOUNTER — OUTPATIENT (OUTPATIENT)
Dept: OUTPATIENT SERVICES | Facility: HOSPITAL | Age: 84
LOS: 1 days | End: 2019-10-22
Payer: MEDICARE

## 2019-10-22 PROCEDURE — 73502 X-RAY EXAM HIP UNI 2-3 VIEWS: CPT | Mod: 26,LT

## 2019-10-22 PROCEDURE — 73502 X-RAY EXAM HIP UNI 2-3 VIEWS: CPT

## 2019-10-22 PROCEDURE — 99203 OFFICE O/P NEW LOW 30 MIN: CPT

## 2019-10-25 ENCOUNTER — EMERGENCY (EMERGENCY)
Facility: HOSPITAL | Age: 84
LOS: 1 days | Discharge: HOME | End: 2019-10-25
Attending: EMERGENCY MEDICINE | Admitting: EMERGENCY MEDICINE
Payer: MEDICARE

## 2019-10-25 VITALS
HEART RATE: 63 BPM | SYSTOLIC BLOOD PRESSURE: 136 MMHG | DIASTOLIC BLOOD PRESSURE: 70 MMHG | OXYGEN SATURATION: 98 % | TEMPERATURE: 98 F | RESPIRATION RATE: 16 BRPM

## 2019-10-25 VITALS
HEIGHT: 62 IN | RESPIRATION RATE: 16 BRPM | HEART RATE: 62 BPM | OXYGEN SATURATION: 100 % | SYSTOLIC BLOOD PRESSURE: 148 MMHG | WEIGHT: 130.07 LBS | DIASTOLIC BLOOD PRESSURE: 78 MMHG | TEMPERATURE: 98 F

## 2019-10-25 LAB — TROPONIN T SERPL-MCNC: <0.01 NG/ML — SIGNIFICANT CHANGE UP (ref 0–0.01)

## 2019-10-25 PROCEDURE — 71045 X-RAY EXAM CHEST 1 VIEW: CPT

## 2019-10-25 PROCEDURE — 84484 ASSAY OF TROPONIN QUANT: CPT

## 2019-10-25 PROCEDURE — 82550 ASSAY OF CK (CPK): CPT

## 2019-10-25 PROCEDURE — 93005 ELECTROCARDIOGRAM TRACING: CPT

## 2019-10-25 PROCEDURE — 99285 EMERGENCY DEPT VISIT HI MDM: CPT

## 2019-10-25 PROCEDURE — 71045 X-RAY EXAM CHEST 1 VIEW: CPT | Mod: 26

## 2019-10-25 PROCEDURE — 85025 COMPLETE CBC W/AUTO DIFF WBC: CPT

## 2019-10-25 PROCEDURE — 85610 PROTHROMBIN TIME: CPT

## 2019-10-25 PROCEDURE — 99284 EMERGENCY DEPT VISIT MOD MDM: CPT | Mod: 25

## 2019-10-25 PROCEDURE — 80053 COMPREHEN METABOLIC PANEL: CPT

## 2019-10-25 PROCEDURE — 85730 THROMBOPLASTIN TIME PARTIAL: CPT

## 2019-10-25 PROCEDURE — 93010 ELECTROCARDIOGRAM REPORT: CPT

## 2019-10-25 PROCEDURE — 36415 COLL VENOUS BLD VENIPUNCTURE: CPT

## 2019-10-25 PROCEDURE — 82553 CREATINE MB FRACTION: CPT

## 2019-10-25 PROCEDURE — 83880 ASSAY OF NATRIURETIC PEPTIDE: CPT

## 2019-10-25 NOTE — ED ADULT NURSE NOTE - CHPI ED NUR SYMPTOMS NEG
no dizziness/no nausea/no vomiting/no fever/no congestion/no diaphoresis/no syncope/no back pain/no chills/no shortness of breath

## 2019-10-25 NOTE — ED ADULT TRIAGE NOTE - OTHER COMPLAINTS
accompanied by son who provides history: pt c/o R side chest pain started today, hx dementia, ekg in progress accompanied by son who provides history: pt c/o R side chest pain started today, no cough/sob. ekg in progress

## 2019-10-25 NOTE — ED PROVIDER NOTE - PHYSICAL EXAMINATION
Constitutional: Elderly, frail, awake, alert, oriented to person (baseline a/p family) and in no apparent distress.  ENMT: Airway patent. Normal MM  Eyes: Clear bilaterally  Cardiac: Normal rate, regular rhythm.  Heart sounds S1, S2.  No murmurs, rubs or gallops. No JVD or LE edema  Respiratory: Breaths sounds equal and clear b/l. No W/R/R. No increased WOB, tachypnea, hypoxia, or accessory mm use. Pt speaks in full sentences.   Gastrointestinal: Abd soft, NT, ND, NABS. No guarding, rebound, or rigidity. No pulsatile abdominal masses. No organomegaly appreciated. No CVAT   Musculoskeletal: Range of motion is not limited. trace non pitting edema. no calf ttp  Neuro: Alert and oriented x 3, face symmetric and speech fluent. Strength 5/5 x 4 ext and symmetric, nml gross motor movement, nml gait. No focal deficits noted.  Skin: Skin normal color for race, warm, dry and intact. No evidence of rash.  Psych: Alert and oriented to person, place, time/situation. normal mood and affect. no apparent risk to self or others.

## 2019-10-25 NOTE — ED PROVIDER NOTE - CLINICAL SUMMARY MEDICAL DECISION MAKING FREE TEXT BOX
Pt p/w R sided CP, onset this morning, limited hx 2/2 dementia. Pt in no apparent distress. No resp complaints. No f/c. No EKG changes to suggest ischemia, infarction, pericarditis or pericardial effusion. No tachypnea, hypoxia, tachycadia, or tachypnea to suggest PE. H&P not c/w dissection. Clear lungs, doubt PNA, effusion, PTX. Check labs, EKG, CXR, monitor in ED. D/w Dr Patel. Dispo pending w/u and clinical status

## 2019-10-25 NOTE — ED PROVIDER NOTE - OBJECTIVE STATEMENT
Pt w/ PMHx HLD, dementia (A&Ox), pAF (no AC), CKD, cataracts, PNA, UTI, p/w CP, onset 9 am this morning. Pt was clutching her chest and the HHA noted and alerted the family. Pt admits to pain and points to her R chest. Pt is able to contribute to hx 2/2 baseline dementia. Pt unable to state the type of pain. She notes the pain is moderate in severity. Denies SOB, abd pain. Family denies f/c, cough, V/D, changes in bowel or bladder habits. No falls. Family called Dr Patel and was advised to come to the ED

## 2019-10-25 NOTE — ED ADULT NURSE NOTE - OBJECTIVE STATEMENT
Pt is a 89y female presented to ED w/ c/o chest pain. PT AAOx1, ambulatory w/ walker. As per family member she has been complaining of chest pain, " holding her hand up to the right side of her chest." Pt denies pain upon assessment, presents agitated. As per family, pt has Hx of dementia. PT denies falls, denies SOB, denies abdominal pain. No physical trauma/ injury noted, skin in tact. EKG completed

## 2019-10-25 NOTE — ED ADULT NURSE NOTE - OTHER COMPLAINTS
accompanied by son who provides history: pt c/o R side chest pain started today, no cough/sob. ekg in progress

## 2019-10-25 NOTE — ED PROVIDER NOTE - PATIENT PORTAL LINK FT
You can access the FollowMyHealth Patient Portal offered by [facility] by registering at the following website: [email or web address]. By joining [employer]’s FollowMyHealth portal, you will also be able to view your health information using other applications (apps) compatible with our system.

## 2019-10-25 NOTE — ED ADULT NURSE NOTE - NSIMPLEMENTINTERV_GEN_ALL_ED
Implemented All Universal Safety Interventions:  [place] to call system. Call bell, personal items and telephone within reach. Instruct patient to call for assistance. Room bathroom lighting operational. Non-slip footwear when patient is off stretcher. Physically safe environment: no spills, clutter or unnecessary equipment. Stretcher in lowest position, wheels locked, appropriate side rails in place.

## 2019-10-25 NOTE — ED PROVIDER NOTE - NSFOLLOWUPINSTRUCTIONS_ED_ALL_ED_FT
You were evaluated in the ED for chest pain. Your EKG, xray of the chest, and serial blood work did not reveal any acute abnormalities. Follow up with your primary medical doctor, and Dr Patel / Dr Ortiz.    Chest Pain    Chest pain can be caused by many different conditions which may or may not be dangerous. Causes include heartburn, lung infections, heart attack, blood clot in lungs, skin infections, strain or damage to muscle, cartilage, or bones, etc. In addition to a history and physical examination, an electrocardiogram (ECG) or other lab tests may have been performed to determine the cause of your chest pain. Follow up with your primary care provider or with a cardiologist as instructed.     SEEK IMMEDIATE MEDICAL CARE IF YOU HAVE ANY OF THE FOLLOWING SYMPTOMS: worsening chest pain, coughing up blood, unexplained back/neck/jaw pain, severe abdominal pain, dizziness or lightheadedness, fainting, shortness of breath, sweaty or clammy skin, vomiting, or racing heart beat. These symptoms may represent a serious problem that is an emergency. Do not wait to see if the symptoms will go away. Get medical help right away. Call 911 and do not drive yourself to the hospital.

## 2019-10-25 NOTE — ED PROVIDER NOTE - PROGRESS NOTE DETAILS
D/w cardiologist Dr Patel - she will be in the ED in approx 30 min to see the pt Rpt troponin negative. Pt seen in the ED by cardiologist Dr Patel. Will d/c w/ f/u as outpt

## 2019-10-29 DIAGNOSIS — R07.89 OTHER CHEST PAIN: ICD-10-CM

## 2019-11-08 NOTE — HISTORY OF PRESENT ILLNESS
[de-identified] : Tanya is a 89-year-old lady here today with her family for evaluation of left hip pain.history given by granddaughter and children, who gives history of some sort of dementia. She had a left hip fracture and a left hemiarthroplasty in April. She was previously high functioning playing golf and dance and, now she has difficulty even walking. No other complaints.

## 2019-11-08 NOTE — ASSESSMENT
[FreeTextEntry1] : Assessment/plan\par Gait disturbance after left hip hemiarthroplasty\par \par We will defer to Gerson Infante PT in [place] for a thorough evaluation. Followup after evaluation.\par \par All medical record entries made by the PA/Shantelleibdayami/Fellow are at my, Dr. Deshaun Coyle's direction and personally dictated by me on 10/22/2019]. I have reviewed the chart and agree that the record accurately reflects my personal performance of the history, physical exam, assessment, and plan. I have also personally directed reviewed, and agreed with the chart.\par

## 2019-11-08 NOTE — PHYSICAL EXAM
[de-identified] : General: Not in acute distress, dressed appropriately, sitting on examination table\par Skin: Warm and dry, normal turgor, no rashes\par Neurological: AOx3, Cranial nerves grossly in tact\par Psych: Mood and affect appropriate\par \par Left Hip: No swelling edema erythema redness or drainage. Well-healed lateral surgical incision. Non-tender. ROM: Hip flexion 120, abduction 30, int/ext rotation 45. Painless range of motion. 5/5 strength. Normal gait. \par

## 2019-12-05 ENCOUNTER — APPOINTMENT (OUTPATIENT)
Dept: HEART AND VASCULAR | Facility: CLINIC | Age: 84
End: 2019-12-05
Payer: MEDICARE

## 2019-12-05 PROCEDURE — 93306 TTE W/DOPPLER COMPLETE: CPT

## 2019-12-11 ENCOUNTER — APPOINTMENT (OUTPATIENT)
Dept: ORTHOPEDIC SURGERY | Facility: CLINIC | Age: 84
End: 2019-12-11
Payer: MEDICARE

## 2019-12-11 PROCEDURE — 99213 OFFICE O/P EST LOW 20 MIN: CPT

## 2019-12-13 NOTE — HISTORY OF PRESENT ILLNESS
[de-identified] : Tanya is a 89 year old demented female s/p left hip fracture and a left hemiarthroplasty 9 months ago. She is here today with family at the bed side acting as the main historians. Family states Tanya's walking has improved but they relate leg length discrepancy. \par \par

## 2019-12-13 NOTE — ASSESSMENT
[FreeTextEntry1] : Assessment/Plan\par \par Gait disturbance after left hip hemiarthroplasty\par \par F/U with Gerson Infante, PT for evaluation and treatment\par \par \par All medical record entries made by the PA/August/Fellow are at my, Dr. Deshaun Coyle's direction and personally dictated by me on 12/11/2019]. I have reviewed the chart and agree that the record accurately reflects my personal performance of the history, physical exam, assessment, and plan. I have also personally directed reviewed, and agreed with the chart.\par \par \par \par \par \par \par

## 2020-01-08 NOTE — ED ADULT TRIAGE NOTE - NSWEIGHTCALCTOOLDRUG_GEN_A_CORE
Additional Notes: Defer to [facility] for further treatment of laser hair removal. Additional Notes: Defer to [facility] for further treatment of laser hair removal.  used

## 2020-02-13 ENCOUNTER — RX RENEWAL (OUTPATIENT)
Age: 85
End: 2020-02-13

## 2020-02-14 ENCOUNTER — RX RENEWAL (OUTPATIENT)
Age: 85
End: 2020-02-14

## 2020-02-28 RX ORDER — TORSEMIDE 5 MG/1
5 TABLET ORAL
Qty: 90 | Refills: 3 | Status: ACTIVE | COMMUNITY
Start: 2018-04-06 | End: 1900-01-01

## 2020-04-02 NOTE — H&P ADULT - PROBLEM SELECTOR PROBLEM 1
Detail Level: Generalized Quality 130: Documentation Of Current Medications In The Medical Record: Current Medications Documented Quality 110: Preventive Care And Screening: Influenza Immunization: Influenza immunization was not ordered or administered, reason not given Syncope

## 2020-04-09 ENCOUNTER — TRANSCRIPTION ENCOUNTER (OUTPATIENT)
Age: 85
End: 2020-04-09

## 2020-07-15 ENCOUNTER — APPOINTMENT (OUTPATIENT)
Dept: ORTHOPEDIC SURGERY | Facility: CLINIC | Age: 85
End: 2020-07-15
Payer: MEDICARE

## 2020-07-15 DIAGNOSIS — Z96.642 AFTERCARE FOLLOWING JOINT REPLACEMENT SURGERY: ICD-10-CM

## 2020-07-15 DIAGNOSIS — Z47.1 AFTERCARE FOLLOWING JOINT REPLACEMENT SURGERY: ICD-10-CM

## 2020-07-15 PROCEDURE — 99212 OFFICE O/P EST SF 10 MIN: CPT | Mod: 95

## 2020-07-22 ENCOUNTER — APPOINTMENT (OUTPATIENT)
Dept: ORTHOPEDIC SURGERY | Facility: CLINIC | Age: 85
End: 2020-07-22
Payer: MEDICARE

## 2020-07-22 PROCEDURE — 99213 OFFICE O/P EST LOW 20 MIN: CPT | Mod: 95

## 2020-08-03 PROBLEM — Z47.1 AFTERCARE FOLLOWING LEFT HIP JOINT REPLACEMENT SURGERY: Status: ACTIVE | Noted: 2019-10-22

## 2020-08-03 NOTE — ASSESSMENT
[FreeTextEntry1] : Assessment/Plan:\par 89 yo female here s/p left hip janine-arthroplasty, gait abnormality\par \par Plan:\par \par 1 Continue PT\par 2 Fall precautions\par Follow up PRN\par \par All medical record entries made by the PA/Scribe/Fellow are at my, Dr. Deshaun Coyle's direction and personally dictated by me on 07/15/2020]. I have reviewed the chart and agree that the record accurately reflects my personal performance of the history, physical exam, assessment, and plan. I have also personally directed reviewed, and agreed with the chart.\par

## 2020-08-03 NOTE — PHYSICAL EXAM
[de-identified] : General: Not in acute distress, dressed appropriately, sitting on examination table\par Skin: Warm and dry, normal turgor, no rashes\par Neurological: AOx3, Cranial nerves grossly in tact\par Psych: Mood and affect appropriate\par \par Left Hip: No swelling edema erythema redness or drainage. Well-healed lateral surgical incision. Non-tender. ROM: Hip flexion 120, abduction 30, int/ext rotation 45. Painless range of motion. 5/5 strength. Severe antalgic gait with wheeled walker. \par

## 2020-08-03 NOTE — HISTORY OF PRESENT ILLNESS
[de-identified] : Follow up after left hip janine arthroplasty, severe alzheimer's dementia, patient continues to have difficulty ambulating and struggles with PT.

## 2020-08-04 NOTE — ED PROVIDER NOTE - TOBACCO USE
Never smoker
I will START or STAY ON the medications listed below when I get home from the hospital:    LORazepam 0.5 mg oral tablet  -- 1 tab(s) by mouth 2 times a day MDD:MDD= 1 MG  -- Indication: For Bipolar disorder, current episode mixed, moderate    lithium 600 mg oral capsule  -- 1 cap(s) by mouth once a day (at bedtime)  -- Indication: For Bipolar disorder, current episode mixed, moderate    QUEtiapine 50 mg oral tablet  -- 3 tab(s) by mouth once a day (at bedtime)  -- Indication: For Bipolar disorder, current episode mixed, moderate    hydrOXYzine hydrochloride 25 mg oral tablet  -- 1 tab(s) by mouth every 8 hours, As needed, Anxiety  -- Indication: For Anxiety

## 2020-08-07 DIAGNOSIS — N32.81 OVERACTIVE BLADDER: ICD-10-CM

## 2020-08-07 DIAGNOSIS — H16.001 UNSPECIFIED CORNEAL ULCER, RIGHT EYE: ICD-10-CM

## 2020-08-07 DIAGNOSIS — R55 SYNCOPE AND COLLAPSE: ICD-10-CM

## 2020-08-07 DIAGNOSIS — Z87.01 PERSONAL HISTORY OF PNEUMONIA (RECURRENT): ICD-10-CM

## 2020-08-07 DIAGNOSIS — N95.2 POSTMENOPAUSAL ATROPHIC VAGINITIS: ICD-10-CM

## 2020-08-07 DIAGNOSIS — N18.3 CHRONIC KIDNEY DISEASE, STAGE 3 (MODERATE): ICD-10-CM

## 2020-08-07 DIAGNOSIS — R32 UNSPECIFIED URINARY INCONTINENCE: ICD-10-CM

## 2020-08-07 RX ORDER — ESTRADIOL 0.1 MG/G
0.1 CREAM VAGINAL
Qty: 42.5 | Refills: 3 | Status: DISCONTINUED | COMMUNITY
Start: 2018-03-09 | End: 2020-08-07

## 2020-08-11 NOTE — PHYSICAL EXAM
[de-identified] : none today  [de-identified] : Not in acute distress, dressed appropriately, sitting on examination table \par Skin: Warm and dry, normal turgor, no rashes \par Neurological: AOx3, Cranial nerves grossly in tact \par Psych: Mood and affect appropriate \par \par Right Hip: No swelling, edema, erythema, redness or drainage. Non-tender to palpation. ROM: Hip flexion 0-120, abduction 30, internal ext rotation 45 with painless ROM. 5/5 strength. Normal gait\par \par Left Hip: No swelling, edema, erythema, redness or drainage. Non-tender to palpation. ROM: Hip flexion 0-120, abduction 30, internal ext rotation 45 with painless ROM. 5/5 strength. Normal gait\par \par

## 2020-08-11 NOTE — ASSESSMENT
[FreeTextEntry1] : Assessment/Plan:\par \par \par 89 yo female here s/p left hip janine-arthroplasty, doing well\par \par Plan:\par \par 1)F/U in 3 months.\par \par \par All medical record entries made by the PA/Scribe/Fellow are at my, Dr. Deshaun Coyle's direction and personally dictated by me on 07/22/2020]. I have reviewed the chart and agree that the record accurately reflects my personal performance of the history, physical exam, assessment, and plan. I have also personally directed reviewed, and agreed with the chart.\par \par

## 2020-08-11 NOTE — HISTORY OF PRESENT ILLNESS
[Home] : at home, [unfilled] , at the time of the visit. [Medical Office: ([facility])___] : at the medical office located in  [Verbal consent obtained from patient] : the patient, [unfilled] [de-identified] : 89 yo female s/p Left hip fracture repaired with left janine arthroplasty on 03/2019 and feeling great. Ambulation, mobility, and ROM are improved. No other complaints at this time. Verbal Consent was given to proceed with telehealth visit by patient prior to the start of the telehealth visit.\par \par

## 2020-10-09 ENCOUNTER — TRANSCRIPTION ENCOUNTER (OUTPATIENT)
Age: 85
End: 2020-10-09

## 2020-11-11 NOTE — H&P ADULT - HISTORY OF PRESENT ILLNESS
89 y/o active female (lives alone w/[facility] home health aid) w/ PMHx of HTN, HPL, Dementia, hx Cerebral Artery Occlusion, AS, pAfib (no AC, pt. new onset two weeks ago 2/2 to UTI/Pyelo in [place]; currently SR) and hx of recent hospitalization in [place], [place] for UTI/Pyelo and Sinusitis X 4days (tx with Abx) two weeks ago while on vacation with family, BIBA to [facility] ER this evening, 1/15/17, after witness syncope at restaurant with family, no prodrome symptoms reported.  Pt. and her family flew back today from [place] feeling fine until this evening while having dinner at restaurant.  According to patient's son, they were eating desert when his mother slumped over in chair, staring with mild shaking of hands, pt. was unresponsive for approximately one minute.    Pt. denies urinary or bowel incontinence, AMS afterwards.   While on vacation in [place] this month pt. developed complicated UTI with Sinusitis requiring hospitalization for treatment with Abx two weeks ago.  Pt. was hospitalized for four days w/ IV abx  and discharged with oral Abx in which pt. recently finished.   Pt. son reports pt.'s Cardiologist, who is affiliated with  [facility], told his mother she has AS, and has been monitoring her carefully. Pt. denies fever, chills, cp, SOB, abdominal discomfort and N/V.  In ER ECG reveals Sinus Heri @59bpm with non specific ST-T wave changes, CE negative X1, UA reveals UTI (repeat ordered), WBC normal, Blood Glucose 188, CXR no acute pathology seen, CTA of head w/o contrast reveals no acute hemorrhage or ischemic changes; sinusitis is seen.  Fluids were started in ER (NS 1 Liter run @75cc hour; stopped once pt. admitted to floor).  Pt. admitted to [facility] for telemetry, ECG, serial CE, Echocardiogram, repeat UA and urine Cx,  TSH and syncope workup.  Discuss plan with Dr. Stahl in AM. none

## 2020-11-19 NOTE — ED ADULT NURSE NOTE - CHIEF COMPLAINT QUOTE
Pt alert to self. Per family this is baseline for patient. pt was at restaurant with care giver and slumped over in her chair. per caregiver pt closed her eyes for a second. denies head trauma. F/S 194 in EMS. pt currently c/o "slight" abd pain. denies dizziness, weakness, cp, sob. pmh tia, prediabetic, dementia.
Appropriate

## 2021-01-01 NOTE — ED ADULT NURSE NOTE - NSSISCREENINGQ2_ED_A_ED
[Normal Growth] : growth [Normal Development] : development  [No Elimination Concerns] : elimination [Continue Regimen] : feeding [No Skin Concerns] : skin [Normal Sleep Pattern] : sleep [None] : no medical problems [Anticipatory Guidance Given] : Anticipatory guidance addressed as per the history of present illness section [Age Approp Vaccines] : DTaP, Hib, IPV, Hepatitis B, Rotavirus, and Pneumococcal administered [No Medications] : ~He/She~ is not on any medications [Parent/Guardian] : Parent/Guardian [] : The components of the vaccine(s) to be administered today are listed in the plan of care. The disease(s) for which the vaccine(s) are intended to prevent and the risks have been discussed with the caretaker.  The risks are also included in the appropriate vaccination information statements which have been provided to the patient's caregiver.  The caregiver has given consent to vaccinate. [FreeTextEntry1] : 4 month well visit:\par Parental concerns: none\par Recent injury/illness:  none\par Special health care needs:  none\par Visits to other health care providers/facilities:  none\par Changes/stressors in family or home: none\par Observation of parent-child interaction: normal (parents/infant respond to each other; parents attentive and comfort when infant cries; reciprocal engagement around feeding/eating)\par  No

## 2021-01-21 ENCOUNTER — APPOINTMENT (OUTPATIENT)
Dept: WOUND CARE | Facility: CLINIC | Age: 86
End: 2021-01-21
Payer: MEDICARE

## 2021-01-21 DIAGNOSIS — S31.829A UNSPECIFIED OPEN WOUND OF LEFT BUTTOCK, INITIAL ENCOUNTER: ICD-10-CM

## 2021-01-21 DIAGNOSIS — L30.8 OTHER SPECIFIED DERMATITIS: ICD-10-CM

## 2021-01-21 DIAGNOSIS — G30.9 ALZHEIMER'S DISEASE, UNSPECIFIED: ICD-10-CM

## 2021-01-21 DIAGNOSIS — F02.80 ALZHEIMER'S DISEASE, UNSPECIFIED: ICD-10-CM

## 2021-01-21 PROCEDURE — 99443: CPT

## 2021-01-21 NOTE — REASON FOR VISIT
[Consultation] : a consultation visit [Family Member] : family member [FreeTextEntry1] : buttock wound

## 2021-01-21 NOTE — HISTORY OF PRESENT ILLNESS
[Home] : at home, [unfilled] , at the time of the visit. [Medical Office: ([facility])___] : at the medical office located in  [Family Member] : family member [FreeTextEntry3] : Hernandez lopez [FreeTextEntry1] : Son, Hernandez, was not able to perform Teleheaklth- voice was sporadic and image was lost on multiple occasions\par Telephone visit was ultimately done\par patient is a 89 yo female, living with family\par 1 mo ago a Telehealth was reportedly  had with a NP at [facility] who advised use of castor oil and Santyl  to treat area of concern\par Family reports that wound failed to heal and they requested additional opinion\par Medical record reports advanced dementia\par Patient is s/p ORIF of left hip in 2019 and has since had decreased mobility\par She is brought to bathroom several times during the day , but at night wears a diaper due to incontinence\par Aides were employed , but none since Covid\par Patient spends most of the day sitting on an eggcrate cushion\par \par Son was instructed to send photo which was viewed and son called back\par \par I informed family that wound appears moisture related and is located primarily on the left buttock\par Right butock with MAD dermatitis\par \par There is no reported fever\par \par I have informed son, daughter in law and Lisa that diaper is not to be used \par advised use of disposable incontinence pads\par avoidance of sitting\par offload area while at bedrest\par Obtain a ROHO cushion- a script was sent at son's request\par Apply Dyan after cleaning with soap and water\par I instructed family members in wound care and multiple questions were answered \par \par will repeat telehealth in 1 wk\par Potential for deterioration discussed

## 2022-01-25 NOTE — ED PROVIDER NOTE - PRO INTERPRETER NEED 2
English
I saw and examined the patient, and reviewed  the history with the patient and   Agree with note which was also reviewed and edited where appropriate.  D/W patient, RN, residents and Fellow

## 2022-06-14 NOTE — DIETITIAN INITIAL EVALUATION ADULT. - ORAL INTAKE PTA
OB Provider reported that the vagina was inspected and no foreign body was found/Laps, needles and instrument count was correct good

## 2022-10-18 ENCOUNTER — INPATIENT (INPATIENT)
Facility: HOSPITAL | Age: 87
LOS: 5 days | Discharge: HOME HEALTH | DRG: 871 | End: 2022-10-24
Attending: SPECIALIST | Admitting: SPECIALIST
Payer: MEDICARE

## 2022-10-18 VITALS
DIASTOLIC BLOOD PRESSURE: 86 MMHG | SYSTOLIC BLOOD PRESSURE: 155 MMHG | RESPIRATION RATE: 16 BRPM | HEART RATE: 78 BPM | OXYGEN SATURATION: 94 % | HEIGHT: 62 IN

## 2022-10-18 DIAGNOSIS — Z88.1 ALLERGY STATUS TO OTHER ANTIBIOTIC AGENTS STATUS: ICD-10-CM

## 2022-10-18 DIAGNOSIS — I48.0 PAROXYSMAL ATRIAL FIBRILLATION: ICD-10-CM

## 2022-10-18 DIAGNOSIS — I35.0 NONRHEUMATIC AORTIC (VALVE) STENOSIS: ICD-10-CM

## 2022-10-18 DIAGNOSIS — Z91.81 HISTORY OF FALLING: ICD-10-CM

## 2022-10-18 DIAGNOSIS — Z79.899 OTHER LONG TERM (CURRENT) DRUG THERAPY: ICD-10-CM

## 2022-10-18 DIAGNOSIS — L89.153 PRESSURE ULCER OF SACRAL REGION, STAGE 3: ICD-10-CM

## 2022-10-18 DIAGNOSIS — N18.9 CHRONIC KIDNEY DISEASE, UNSPECIFIED: ICD-10-CM

## 2022-10-18 DIAGNOSIS — N26.1 ATROPHY OF KIDNEY (TERMINAL): ICD-10-CM

## 2022-10-18 DIAGNOSIS — A41.51 SEPSIS DUE TO ESCHERICHIA COLI [E. COLI]: ICD-10-CM

## 2022-10-18 DIAGNOSIS — F03.90 UNSPECIFIED DEMENTIA WITHOUT BEHAVIORAL DISTURBANCE: ICD-10-CM

## 2022-10-18 DIAGNOSIS — E46 UNSPECIFIED PROTEIN-CALORIE MALNUTRITION: ICD-10-CM

## 2022-10-18 DIAGNOSIS — Z79.82 LONG TERM (CURRENT) USE OF ASPIRIN: ICD-10-CM

## 2022-10-18 DIAGNOSIS — Z91.018 ALLERGY TO OTHER FOODS: ICD-10-CM

## 2022-10-18 DIAGNOSIS — Z88.8 ALLERGY STATUS TO OTHER DRUGS, MEDICAMENTS AND BIOLOGICAL SUBSTANCES STATUS: ICD-10-CM

## 2022-10-18 DIAGNOSIS — G93.41 METABOLIC ENCEPHALOPATHY: ICD-10-CM

## 2022-10-18 DIAGNOSIS — I50.9 HEART FAILURE, UNSPECIFIED: ICD-10-CM

## 2022-10-18 DIAGNOSIS — E78.5 HYPERLIPIDEMIA, UNSPECIFIED: ICD-10-CM

## 2022-10-18 DIAGNOSIS — Z86.73 PERSONAL HISTORY OF TRANSIENT ISCHEMIC ATTACK (TIA), AND CEREBRAL INFARCTION WITHOUT RESIDUAL DEFICITS: ICD-10-CM

## 2022-10-18 DIAGNOSIS — D84.9 IMMUNODEFICIENCY, UNSPECIFIED: ICD-10-CM

## 2022-10-18 DIAGNOSIS — N39.0 URINARY TRACT INFECTION, SITE NOT SPECIFIED: ICD-10-CM

## 2022-10-18 DIAGNOSIS — I13.0 HYPERTENSIVE HEART AND CHRONIC KIDNEY DISEASE WITH HEART FAILURE AND STAGE 1 THROUGH STAGE 4 CHRONIC KIDNEY DISEASE, OR UNSPECIFIED CHRONIC KIDNEY DISEASE: ICD-10-CM

## 2022-10-18 LAB
ALBUMIN SERPL ELPH-MCNC: 2.9 G/DL — LOW (ref 3.3–5)
ALP SERPL-CCNC: 178 U/L — HIGH (ref 40–120)
ALT FLD-CCNC: 30 U/L — SIGNIFICANT CHANGE UP (ref 10–45)
ANION GAP SERPL CALC-SCNC: 10 MMOL/L — SIGNIFICANT CHANGE UP (ref 5–17)
APTT BLD: 30 SEC — SIGNIFICANT CHANGE UP (ref 27.5–35.5)
AST SERPL-CCNC: 43 U/L — HIGH (ref 10–40)
BASOPHILS # BLD AUTO: 0.08 K/UL — SIGNIFICANT CHANGE UP (ref 0–0.2)
BASOPHILS NFR BLD AUTO: 0.9 % — SIGNIFICANT CHANGE UP (ref 0–2)
BILIRUB SERPL-MCNC: 0.3 MG/DL — SIGNIFICANT CHANGE UP (ref 0.2–1.2)
BUN SERPL-MCNC: 25 MG/DL — HIGH (ref 7–23)
CALCIUM SERPL-MCNC: 8.9 MG/DL — SIGNIFICANT CHANGE UP (ref 8.4–10.5)
CHLORIDE SERPL-SCNC: 99 MMOL/L — SIGNIFICANT CHANGE UP (ref 96–108)
CO2 SERPL-SCNC: 26 MMOL/L — SIGNIFICANT CHANGE UP (ref 22–31)
CREAT SERPL-MCNC: 0.86 MG/DL — SIGNIFICANT CHANGE UP (ref 0.5–1.3)
EGFR: 62 ML/MIN/1.73M2 — SIGNIFICANT CHANGE UP
EOSINOPHIL # BLD AUTO: 0.08 K/UL — SIGNIFICANT CHANGE UP (ref 0–0.5)
EOSINOPHIL NFR BLD AUTO: 0.9 % — SIGNIFICANT CHANGE UP (ref 0–6)
GIANT PLATELETS BLD QL SMEAR: PRESENT — SIGNIFICANT CHANGE UP
GLUCOSE SERPL-MCNC: 107 MG/DL — HIGH (ref 70–99)
HCT VFR BLD CALC: 28.8 % — LOW (ref 34.5–45)
HGB BLD-MCNC: 9.3 G/DL — LOW (ref 11.5–15.5)
INR BLD: 1.11 — SIGNIFICANT CHANGE UP (ref 0.88–1.16)
LACTATE SERPL-SCNC: 1.3 MMOL/L — SIGNIFICANT CHANGE UP (ref 0.5–2)
LYMPHOCYTES # BLD AUTO: 0.39 K/UL — LOW (ref 1–3.3)
LYMPHOCYTES # BLD AUTO: 4.3 % — LOW (ref 13–44)
MAGNESIUM SERPL-MCNC: 1.9 MG/DL — SIGNIFICANT CHANGE UP (ref 1.6–2.6)
MANUAL SMEAR VERIFICATION: SIGNIFICANT CHANGE UP
MCHC RBC-ENTMCNC: 28.7 PG — SIGNIFICANT CHANGE UP (ref 27–34)
MCHC RBC-ENTMCNC: 32.3 GM/DL — SIGNIFICANT CHANGE UP (ref 32–36)
MCV RBC AUTO: 88.9 FL — SIGNIFICANT CHANGE UP (ref 80–100)
MONOCYTES # BLD AUTO: 0.39 K/UL — SIGNIFICANT CHANGE UP (ref 0–0.9)
MONOCYTES NFR BLD AUTO: 4.3 % — SIGNIFICANT CHANGE UP (ref 2–14)
NEUTROPHILS # BLD AUTO: 8.05 K/UL — HIGH (ref 1.8–7.4)
NEUTROPHILS NFR BLD AUTO: 84.4 % — HIGH (ref 43–77)
NEUTS BAND # BLD: 4.3 % — SIGNIFICANT CHANGE UP (ref 0–8)
PLAT MORPH BLD: ABNORMAL
PLATELET # BLD AUTO: 202 K/UL — SIGNIFICANT CHANGE UP (ref 150–400)
POTASSIUM SERPL-MCNC: 4.2 MMOL/L — SIGNIFICANT CHANGE UP (ref 3.5–5.3)
POTASSIUM SERPL-SCNC: 4.2 MMOL/L — SIGNIFICANT CHANGE UP (ref 3.5–5.3)
PROT SERPL-MCNC: 6.8 G/DL — SIGNIFICANT CHANGE UP (ref 6–8.3)
PROTHROM AB SERPL-ACNC: 13.2 SEC — SIGNIFICANT CHANGE UP (ref 10.5–13.4)
RBC # BLD: 3.24 M/UL — LOW (ref 3.8–5.2)
RBC # FLD: 16.5 % — HIGH (ref 10.3–14.5)
RBC BLD AUTO: NORMAL — SIGNIFICANT CHANGE UP
SARS-COV-2 RNA SPEC QL NAA+PROBE: NEGATIVE — SIGNIFICANT CHANGE UP
SODIUM SERPL-SCNC: 135 MMOL/L — SIGNIFICANT CHANGE UP (ref 135–145)
VARIANT LYMPHS # BLD: 0.9 % — SIGNIFICANT CHANGE UP (ref 0–6)
WBC # BLD: 9.08 K/UL — SIGNIFICANT CHANGE UP (ref 3.8–10.5)
WBC # FLD AUTO: 9.08 K/UL — SIGNIFICANT CHANGE UP (ref 3.8–10.5)

## 2022-10-18 PROCEDURE — G1004: CPT

## 2022-10-18 PROCEDURE — 71045 X-RAY EXAM CHEST 1 VIEW: CPT | Mod: 26

## 2022-10-18 PROCEDURE — 99285 EMERGENCY DEPT VISIT HI MDM: CPT

## 2022-10-18 PROCEDURE — 70450 CT HEAD/BRAIN W/O DYE: CPT | Mod: 26,MG

## 2022-10-18 PROCEDURE — 93010 ELECTROCARDIOGRAM REPORT: CPT

## 2022-10-18 RX ORDER — ACETAMINOPHEN 500 MG
1000 TABLET ORAL ONCE
Refills: 0 | Status: COMPLETED | OUTPATIENT
Start: 2022-10-18 | End: 2022-10-18

## 2022-10-18 RX ADMIN — Medication 400 MILLIGRAM(S): at 22:40

## 2022-10-18 RX ADMIN — Medication 1000 MILLIGRAM(S): at 23:31

## 2022-10-18 RX ADMIN — Medication 1000 MILLIGRAM(S): at 23:06

## 2022-10-18 NOTE — ED PROVIDER NOTE - PHYSICAL EXAMINATION
CONST: +ams NAD  HEAD: atraumatic  EYES: conjunctivae clear, PERRL, no palsy  ENT: mmm  NECK: supple/FROM, no jvd  CARD: rrr no murmurs  CHEST: ctab no r/r/w  ABD: soft, nd, nttp, no rebound/guarding  EXT: FROM, symmetric distal pulses intact  SKIN: warm, dry, +sacral decub w/o cellulitis, no pedal edema/ttp/rash, cap refill <2sec  NEURO: +sleepy but arousable, +confused per family, unable to follow commands

## 2022-10-18 NOTE — ED PROVIDER NOTE - NSCAREINITIATED _GEN_ER
Sumaya Pedersen(Attending) HPI:  Note patient is A&Ox1 at time of admit to [facility] and therefore history is obtained is limited to previous charting. baseline mental status also suggested dementia reporting A&Ox2 baseline at [facility]    85F Nepali-speaking w/ hx of severe MV regurgitation, HFrEF s/p AICD, chronic afib, DM2, HTN, hyperthyroidism presented initially to [facility] with acute heart failure now transferred to [facility] for further management. The patient was admitted initially to [facility]  with symptoms of sob and lower extremity swelling in setting of delaying mitral clip at [facility] reportedly planned 1mo prior. She required dobutamine for IV furosemide diuresis, completed TTE demonstrating severe MR, TR, pulmHTN, and grossly low normal left ventricular systolic function. She additionally was identified to have dysphagia and completed speech and swallow which identify need for dysphagia diet. Wound care RN additionally identified and were treating decubitus wounds which were present prior to hospitalization. (29 Aug 2021 23:34)            RECENT VITALS/LABS/MEDICATIONS/ASSAYS     09-15-21 @ 13:58    T(C): 36.6 (09-15-21 @ 11:50), Max: 36.7 (09-15-21 @ 04:06)  HR: 76 (09-15-21 @ 11:50) (68 - 92)  BP: 100/57 (09-15-21 @ 11:50) (100/57 - 121/74)  RR: 18 (09-15-21 @ 11:50) (17 - 18)  SpO2: 96% (09-15-21 @ 11:50) (93% - 97%)  Wt(kg): --      14 Sep 2021 07:  -  15 Sep 2021 07:00  --------------------------------------------------------  IN:    Oral Fluid: 780 mL    Oral Fluid: 200 mL  Total IN: 980 mL    OUT:    Voided (mL): 850 mL  Total OUT: 850 mL    Total NET: 130 mL      15 Sep 2021 07:  -  15 Sep 2021 13:58  --------------------------------------------------------  IN:    Oral Fluid: 290 mL  Total IN: 290 mL    OUT:  Total OUT: 0 mL    Total NET: 290 mL           @ 07:01  -  09-15 @ 07:00  --------------------------------------------------------  IN: 980 mL / OUT: 850 mL / NET: 130 mL    09-15 @ 07:01  -  09-15 @ 13:58  --------------------------------------------------------  IN: 290 mL / OUT: 0 mL / NET: 290 mL      CAPILLARY BLOOD GLUCOSE      POCT Blood Glucose.: 185 mg/dL (15 Sep 2021 11:29)  POCT Blood Glucose.: 135 mg/dL (15 Sep 2021 07:46)  POCT Blood Glucose.: 168 mg/dL (14 Sep 2021 20:59)  POCT Blood Glucose.: 146 mg/dL (14 Sep 2021 16:27)        acetaminophen   Tablet .. 650 milliGRAM(s) Oral every 6 hours PRN  apixaban 2.5 milliGRAM(s) Oral two times a day  ascorbic acid 500 milliGRAM(s) Oral daily  benzocaine 15 mG/menthol 3.6 mG (Sugar-Free) Lozenge 1 Lozenge Oral every 2 hours PRN  carvedilol 3.125 milliGRAM(s) Oral every 12 hours  chlorhexidine 4% Liquid 1 Application(s) Topical daily  dextrose 40% Gel 15 Gram(s) Oral once  dextrose 5%. 1000 milliLiter(s) IV Continuous <Continuous>  dextrose 5%. 1000 milliLiter(s) IV Continuous <Continuous>  dextrose 50% Injectable 25 Gram(s) IV Push once  dextrose 50% Injectable 12.5 Gram(s) IV Push once  dextrose 50% Injectable 25 Gram(s) IV Push once  digoxin     Tablet 125 MICROGram(s) Oral daily  furosemide    Tablet 40 milliGRAM(s) Oral daily  glucagon  Injectable 1 milliGRAM(s) IntraMuscular once  haloperidol    Injectable 0.25 milliGRAM(s) IV Push every 8 hours PRN  hemorrhoidal Ointment 1 Application(s) Rectal four times a day PRN  insulin lispro (ADMELOG) corrective regimen sliding scale   SubCutaneous three times a day before meals  insulin lispro (ADMELOG) corrective regimen sliding scale   SubCutaneous at bedtime  multivitamin 1 Tablet(s) Oral daily  nystatin Powder 1 Application(s) Topical two times a day  pantoprazole    Tablet 40 milliGRAM(s) Oral before breakfast  polyethylene glycol 3350 17 Gram(s) Oral daily  sacubitril 24 mG/valsartan 26 mG 1 Tablet(s) Oral two times a day  senna 2 Tablet(s) Oral at bedtime  spironolactone 25 milliGRAM(s) Oral daily          09-15    144  |  98  |  48<H>  ----------------------------<  128<H>  4.0   |  35<H>  |  0.95    Ca    9.3      15 Sep 2021 06:40        Procalc  BNP  ABG                          9.3    10.50 )-----------( 204      ( 15 Sep 2021 06:40 )             32.7         Urinalysis Basic - ( 14 Sep 2021 03:59 )    Color: Yellow / Appearance: Turbid / S.015 / pH: x  Gluc: x / Ketone: Negative  / Bili: Negative / Urobili: 2 mg/dL   Blood: x / Protein: 30 mg/dL / Nitrite: Negative   Leuk Esterase: Large / RBC: 6 /hpf /  /HPF   Sq Epi: x / Non Sq Epi: 3 /hpf / Bacteria: Many      blood and urine cultures              PERTINENT PM/SXH:   CHF (Congestive Heart Failure)    COPD (Chronic Obstructive Pulmonary Disease)    Asthma    DM (Diabetes Mellitus)    Artificial Cardiac Pacemaker    Arrhythmia    CVA (Cerebral Vascular Accident)      Presence of cardiac defibrillator      FAMILY HISTORY:  No pertinent family history in first degree relatives      ITEMS NOT CHECKED ARE NOT PRESENT    SOCIAL HISTORY:   Significant other/partner[ ]  Children[  ]  Anabaptist/Spirituality:  Substance hx:  [ ]   Tobacco hx:  [ ]   Alcohol hx: [ ]   Home Opioid hx:  [ ] I-Stop Reference No:  Living Situation: [ x]Home  [ ]Long term care  [ ]Rehab [ ]Other    ADVANCE DIRECTIVES:    DNR  MOLST  [ ]  Living Will  [ ]   DECISION MAKER(s):  [ ] Health Care Proxy(s)  [x ] Surrogate(s)  [ ] Guardian           Name(s): Phone Number(s):    BASELINE (I)ADL(s) (prior to admission):  Dennard: [ ]Total  [ ] Moderate [ ]Dependent    Allergies    No Known Allergies    Intolerances     PRESENT SYMPTOMS: [ ]Unable to obtain due to poor mentation   Source if other than patient:  [ ]Family   [ ]Team     Pain: [ ]yes [x ]no  QOL impact -   Location -                    Aggravating factors -  Quality -  Radiation -  Timing-  Severity (0-10 scale):  Minimal acceptable level (0-10 scale):     PAIN AD Score:     [email or web address]/[email or web address] (press ctrl +  left click to view)    Dyspnea:                           [ ]Mild [ ]Moderate [ ]Severe  Anxiety:                             [ ]Mild [ ]Moderate [ ]Severe  Fatigue:                             [ ]Mild [ ]Moderate [ ]Severe  Nausea:                             [ ]Mild [ ]Moderate [ ]Severe  Loss of appetite:              [ ]Mild [ ]Moderate [ ]Severe  Constipation:                    [ ]Mild [ ]Moderate [ ]Severe    Other Symptoms:  [ x]All other review of systems negative     Palliative Performance Status Version 2:      40   %    http://npcrc.org/files/news/palliative_performance_scale_ppsv2.pdf  PHYSICAL EXAM:         GENERAL:  [  ]Alert  [  ]Oriented x  3  [ ]Lethargic  [ ]Cachexia  [ ]Unarousable  [x ]Verbal  [ ]Non-Verbal  Behavioral:   [ ] Anxiety  [ ] Delirium [ ] Agitation [x ] Other Calm  HEENT:  [x ]Normal   [ ]Dry mouth   [ ]ET Tube/Trach  [ ]Oral lesions  PULMONARY:   [  ]Clear [ ]Tachypnea  [ ]Audible excessive secretions   [ ]Rhonchi        [ ]Right [ ]Left [ ]Bilateral  [ ]Crackles        [ ]Right [ ]Left [ ]Bilateral  [ ]Wheezing     [ ]Right [ ]Left [ ]Bilatera  x[x ]Diminished breath sounds [ ]right [ ]left [ ]bilateral  CARDIOVASCULAR: AICD  [ x]Regular [ ]Irregular [ ]Tachy  [ ]Juan A [ ]Murmur [ ]Other  GASTROINTESTINAL:  [ x]Soft  [ ]Distended   [ ]+BS  [x ]Non tender [ ]Tender  [ ]PEG [ ]OGT/ NGT  Last BM:   GENITOURINARY:  [ x]Normal [ ] Incontinent   [ ]Oliguria/Anuria   [ ]Esquivel  MUSCULOSKELETAL:   [  ]Normal   [  ]Weakness  [ ]Bed/Wheelchair bound [ ]Edema  NEUROLOGIC:   [ x]No focal deficits  [ ]Cognitive impairment  [ ]Dysphagia [ ]Dysarthria [ ]Paresis [ ]Other   SKIN:   [x ]Normal    [ ]Rash  [ ]Pressure ulcer(s)       Present on admission [ ]y [ ]n    CRITICAL CARE:  [ ] Shock Present  [ ]Septic [ ]Cardiogenic [ ]Neurologic [ ]Hypovolemic  [ ]  Vasopressors [ ]  Inotropes   [ ]Respiratory failure present [ ]Mechanical ventilation [ ]Non-invasive ventilatory support [ ]High flow  [ ]Acute  [ ]Chronic [ ]Hypoxic  [ ]Hypercarbic [ ]Other  [ ]Other organ failure     LABS:                        9.3    9.30  )-----------( 191      ( 09 Sep 2021 06:10 )             32.6       143  |  93<L>  |  55<H>  ----------------------------<  122<H>  3.9   |  36<H>  |  0.89    Ca    9.3      09 Sep 2021 06:10  Mg     2.0             Urinalysis Basic - ( 07 Sep 2021 18:17 )    Color: Light Orange / Appearance: Turbid / S.012 / pH: x  Gluc: x / Ketone: Negative  / Bili: Negative / Urobili: 2 mg/dL   Blood: x / Protein: 30 mg/dL / Nitrite: Negative   Leuk Esterase: Large / RBC: 3 /hpf /  /HPF   Sq Epi: x / Non Sq Epi: 6 /hpf / Bacteria: Many      RADIOLOGY & ADDITIONAL STUDIES:    PROTEIN CALORIE MALNUTRITION PRESENT: [ ]mild [ ]moderate [ ]severe [ ]underweight [ ]morbid obesity  https://www.andeal.org/vault/8420/web/files/ONC/Table_Clinical%20Characteristics%20to%20Document%20Malnutrition-White%20JV%20et%20al%2020.pdf    Height (cm): 160 (21 @ 14:57)  Weight (kg): 52.8 (21 @ 20:15), 45.4 (21 @ 14:57)  BMI (kg/m2): 20.6 (21 @ 20:15), 17.7 (21 @ 14:57)    [ ]PPSV2 < or = to 30% [ ]significant weight loss  [ ]poor nutritional intake  [ ]anasarca      [ ]Artificial Nutrition      REFERRALS:   [ ]Chaplaincy  [ ]Hospice  [ ]Child Life  [ ]Social Work  [ ]Case management [ ]Holistic Therapy     Goals of Care Document:

## 2022-10-18 NOTE — ED ADULT TRIAGE NOTE - ARRIVAL INFO ADDITIONAL COMMENTS
pt brought in by [facility] doctor who states she had a full work up for UTI today (rocephin and 750cc ns) with good effect but now is less responsive again.    pt presented there with AMS, lethargy.   has hx of dementia.

## 2022-10-18 NOTE — ED PROVIDER NOTE - OBJECTIVE STATEMENT
89F dementia (a+ox1, can communicate if pain, follows selective simple commands, no complex commands), pafib not on AC, cerebral artery occlusion, ?chf (EF unknown), htn, hld, ckd, recently hospitalized for urosepsis c/b sacral decub (early 9/2022), subsequently tx for recurrent uti s/p bactrim (9/26/22), referred in by dr meyer's office for <24h delirium. seen at [facility] earlier today found to have uti on ua, ucx pending. s/p ctx and 750cc ivf pta. per pcp/wound care, good healing of sacral decub w/o cellulitis. no no fever/chills, no uri/cough, no cp/sob, no abd pain/n/v, no diarrhea, no dysuria, no rash, no trauma.    pcp: mitch

## 2022-10-18 NOTE — ED PROVIDER NOTE - CLINICAL SUMMARY MEDICAL DECISION MAKING FREE TEXT BOX
elevated rectal temp 100.1. hds. no hypoxia. found to have acute delirium in setting of uti on ua. ucx pending. s/p tylenol. already s/p abx/ivf pta. given ?chf, family requesting to defer additional ivf at this time. no leukocytosis vs significant anemia vs electrolyte abnl. glc wnl. covid neg. trop wnl. ekg nonischemic. cxr w/o acute focal consol vs ptx vs pulm edema vs widened mediastinum. ct head w/o acute abnl. dr perales contacted. will admit per reccs.

## 2022-10-19 DIAGNOSIS — N39.0 URINARY TRACT INFECTION, SITE NOT SPECIFIED: ICD-10-CM

## 2022-10-19 DIAGNOSIS — I48.91 UNSPECIFIED ATRIAL FIBRILLATION: ICD-10-CM

## 2022-10-19 DIAGNOSIS — R09.89 OTHER SPECIFIED SYMPTOMS AND SIGNS INVOLVING THE CIRCULATORY AND RESPIRATORY SYSTEMS: ICD-10-CM

## 2022-10-19 DIAGNOSIS — L89.159 PRESSURE ULCER OF SACRAL REGION, UNSPECIFIED STAGE: ICD-10-CM

## 2022-10-19 DIAGNOSIS — G93.41 METABOLIC ENCEPHALOPATHY: ICD-10-CM

## 2022-10-19 DIAGNOSIS — E78.5 HYPERLIPIDEMIA, UNSPECIFIED: ICD-10-CM

## 2022-10-19 DIAGNOSIS — Z29.9 ENCOUNTER FOR PROPHYLACTIC MEASURES, UNSPECIFIED: ICD-10-CM

## 2022-10-19 LAB
ALBUMIN SERPL ELPH-MCNC: 3.2 G/DL — LOW (ref 3.3–5)
ALP SERPL-CCNC: 175 U/L — HIGH (ref 40–120)
ALT FLD-CCNC: 26 U/L — SIGNIFICANT CHANGE UP (ref 10–45)
ANION GAP SERPL CALC-SCNC: 10 MMOL/L — SIGNIFICANT CHANGE UP (ref 5–17)
ANION GAP SERPL CALC-SCNC: 8 MMOL/L — SIGNIFICANT CHANGE UP (ref 5–17)
APPEARANCE UR: ABNORMAL
AST SERPL-CCNC: 30 U/L — SIGNIFICANT CHANGE UP (ref 10–40)
BACTERIA # UR AUTO: ABNORMAL /HPF
BASOPHILS # BLD AUTO: 0.02 K/UL — SIGNIFICANT CHANGE UP (ref 0–0.2)
BASOPHILS NFR BLD AUTO: 0.3 % — SIGNIFICANT CHANGE UP (ref 0–2)
BILIRUB SERPL-MCNC: 0.4 MG/DL — SIGNIFICANT CHANGE UP (ref 0.2–1.2)
BILIRUB UR-MCNC: NEGATIVE — SIGNIFICANT CHANGE UP
BUN SERPL-MCNC: 27 MG/DL — HIGH (ref 7–23)
BUN SERPL-MCNC: 27 MG/DL — HIGH (ref 7–23)
CALCIUM SERPL-MCNC: 8.8 MG/DL — SIGNIFICANT CHANGE UP (ref 8.4–10.5)
CALCIUM SERPL-MCNC: 8.8 MG/DL — SIGNIFICANT CHANGE UP (ref 8.4–10.5)
CHLORIDE SERPL-SCNC: 100 MMOL/L — SIGNIFICANT CHANGE UP (ref 96–108)
CHLORIDE SERPL-SCNC: 99 MMOL/L — SIGNIFICANT CHANGE UP (ref 96–108)
CO2 SERPL-SCNC: 28 MMOL/L — SIGNIFICANT CHANGE UP (ref 22–31)
CO2 SERPL-SCNC: 29 MMOL/L — SIGNIFICANT CHANGE UP (ref 22–31)
COLOR SPEC: YELLOW — SIGNIFICANT CHANGE UP
COMMENT - URINE: SIGNIFICANT CHANGE UP
CREAT SERPL-MCNC: 0.95 MG/DL — SIGNIFICANT CHANGE UP (ref 0.5–1.3)
CREAT SERPL-MCNC: 0.96 MG/DL — SIGNIFICANT CHANGE UP (ref 0.5–1.3)
DIFF PNL FLD: ABNORMAL
EGFR: 54 ML/MIN/1.73M2 — LOW
EGFR: 55 ML/MIN/1.73M2 — LOW
EOSINOPHIL # BLD AUTO: 0.01 K/UL — SIGNIFICANT CHANGE UP (ref 0–0.5)
EOSINOPHIL NFR BLD AUTO: 0.1 % — SIGNIFICANT CHANGE UP (ref 0–6)
EPI CELLS # UR: SIGNIFICANT CHANGE UP /HPF (ref 0–5)
FOLATE SERPL-MCNC: 8 NG/ML — SIGNIFICANT CHANGE UP
GLUCOSE SERPL-MCNC: 100 MG/DL — HIGH (ref 70–99)
GLUCOSE SERPL-MCNC: 99 MG/DL — SIGNIFICANT CHANGE UP (ref 70–99)
GLUCOSE UR QL: NEGATIVE — SIGNIFICANT CHANGE UP
HCT VFR BLD CALC: 28.2 % — LOW (ref 34.5–45)
HGB BLD-MCNC: 9.2 G/DL — LOW (ref 11.5–15.5)
IMM GRANULOCYTES NFR BLD AUTO: 0.3 % — SIGNIFICANT CHANGE UP (ref 0–0.9)
KETONES UR-MCNC: NEGATIVE — SIGNIFICANT CHANGE UP
LEUKOCYTE ESTERASE UR-ACNC: ABNORMAL
LYMPHOCYTES # BLD AUTO: 0.23 K/UL — LOW (ref 1–3.3)
LYMPHOCYTES # BLD AUTO: 3.4 % — LOW (ref 13–44)
MAGNESIUM SERPL-MCNC: 1.8 MG/DL — SIGNIFICANT CHANGE UP (ref 1.6–2.6)
MCHC RBC-ENTMCNC: 28.9 PG — SIGNIFICANT CHANGE UP (ref 27–34)
MCHC RBC-ENTMCNC: 32.6 GM/DL — SIGNIFICANT CHANGE UP (ref 32–36)
MCV RBC AUTO: 88.7 FL — SIGNIFICANT CHANGE UP (ref 80–100)
MONOCYTES # BLD AUTO: 0.28 K/UL — SIGNIFICANT CHANGE UP (ref 0–0.9)
MONOCYTES NFR BLD AUTO: 4.1 % — SIGNIFICANT CHANGE UP (ref 2–14)
NEUTROPHILS # BLD AUTO: 6.29 K/UL — SIGNIFICANT CHANGE UP (ref 1.8–7.4)
NEUTROPHILS NFR BLD AUTO: 91.8 % — HIGH (ref 43–77)
NITRITE UR-MCNC: NEGATIVE — SIGNIFICANT CHANGE UP
NRBC # BLD: 0 /100 WBCS — SIGNIFICANT CHANGE UP (ref 0–0)
PH UR: 7 — SIGNIFICANT CHANGE UP (ref 5–8)
PHOSPHATE SERPL-MCNC: 2.9 MG/DL — SIGNIFICANT CHANGE UP (ref 2.5–4.5)
PLATELET # BLD AUTO: 198 K/UL — SIGNIFICANT CHANGE UP (ref 150–400)
POTASSIUM SERPL-MCNC: 3.6 MMOL/L — SIGNIFICANT CHANGE UP (ref 3.5–5.3)
POTASSIUM SERPL-MCNC: 3.6 MMOL/L — SIGNIFICANT CHANGE UP (ref 3.5–5.3)
POTASSIUM SERPL-SCNC: 3.6 MMOL/L — SIGNIFICANT CHANGE UP (ref 3.5–5.3)
POTASSIUM SERPL-SCNC: 3.6 MMOL/L — SIGNIFICANT CHANGE UP (ref 3.5–5.3)
PROT SERPL-MCNC: 6.3 G/DL — SIGNIFICANT CHANGE UP (ref 6–8.3)
PROT UR-MCNC: 30 MG/DL
RAPID RVP RESULT: SIGNIFICANT CHANGE UP
RBC # BLD: 3.18 M/UL — LOW (ref 3.8–5.2)
RBC # FLD: 16.6 % — HIGH (ref 10.3–14.5)
RBC CASTS # UR COMP ASSIST: < 5 /HPF — SIGNIFICANT CHANGE UP
SARS-COV-2 RNA SPEC QL NAA+PROBE: SIGNIFICANT CHANGE UP
SODIUM SERPL-SCNC: 136 MMOL/L — SIGNIFICANT CHANGE UP (ref 135–145)
SODIUM SERPL-SCNC: 138 MMOL/L — SIGNIFICANT CHANGE UP (ref 135–145)
SP GR SPEC: 1.02 — SIGNIFICANT CHANGE UP (ref 1–1.03)
TSH SERPL-MCNC: 2.45 UIU/ML — SIGNIFICANT CHANGE UP (ref 0.27–4.2)
UROBILINOGEN FLD QL: 0.2 E.U./DL — SIGNIFICANT CHANGE UP
VIT B12 SERPL-MCNC: 372 PG/ML — SIGNIFICANT CHANGE UP (ref 232–1245)
WBC # BLD: 6.85 K/UL — SIGNIFICANT CHANGE UP (ref 3.8–10.5)
WBC # FLD AUTO: 6.85 K/UL — SIGNIFICANT CHANGE UP (ref 3.8–10.5)
WBC UR QL: ABNORMAL /HPF

## 2022-10-19 RX ORDER — SODIUM CHLORIDE 9 MG/ML
1000 INJECTION INTRAMUSCULAR; INTRAVENOUS; SUBCUTANEOUS
Refills: 0 | Status: DISCONTINUED | OUTPATIENT
Start: 2022-10-19 | End: 2022-10-19

## 2022-10-19 RX ORDER — MEMANTINE HYDROCHLORIDE 10 MG/1
5 TABLET ORAL AT BEDTIME
Refills: 0 | Status: DISCONTINUED | OUTPATIENT
Start: 2022-10-19 | End: 2022-10-20

## 2022-10-19 RX ORDER — SIMETHICONE 80 MG/1
80 TABLET, CHEWABLE ORAL DAILY
Refills: 0 | Status: DISCONTINUED | OUTPATIENT
Start: 2022-10-19 | End: 2022-10-24

## 2022-10-19 RX ORDER — FLUTICASONE PROPIONATE 50 MCG
1 SPRAY, SUSPENSION NASAL
Refills: 0 | Status: DISCONTINUED | OUTPATIENT
Start: 2022-10-19 | End: 2022-10-24

## 2022-10-19 RX ORDER — ACETAMINOPHEN 500 MG
650 TABLET ORAL EVERY 6 HOURS
Refills: 0 | Status: DISCONTINUED | OUTPATIENT
Start: 2022-10-19 | End: 2022-10-21

## 2022-10-19 RX ORDER — PANTOPRAZOLE SODIUM 20 MG/1
40 TABLET, DELAYED RELEASE ORAL
Refills: 0 | Status: DISCONTINUED | OUTPATIENT
Start: 2022-10-19 | End: 2022-10-24

## 2022-10-19 RX ORDER — ATORVASTATIN CALCIUM 80 MG/1
1 TABLET, FILM COATED ORAL
Qty: 30 | Refills: 3 | DISCHARGE

## 2022-10-19 RX ORDER — CHOLECALCIFEROL (VITAMIN D3) 125 MCG
1000 CAPSULE ORAL DAILY
Refills: 0 | Status: DISCONTINUED | OUTPATIENT
Start: 2022-10-19 | End: 2022-10-24

## 2022-10-19 RX ORDER — ATORVASTATIN CALCIUM 80 MG/1
10 TABLET, FILM COATED ORAL DAILY
Refills: 0 | Status: DISCONTINUED | OUTPATIENT
Start: 2022-10-19 | End: 2022-10-19

## 2022-10-19 RX ORDER — ATORVASTATIN CALCIUM 80 MG/1
10 TABLET, FILM COATED ORAL AT BEDTIME
Refills: 0 | Status: DISCONTINUED | OUTPATIENT
Start: 2022-10-19 | End: 2022-10-24

## 2022-10-19 RX ORDER — CEFTRIAXONE 500 MG/1
1000 INJECTION, POWDER, FOR SOLUTION INTRAMUSCULAR; INTRAVENOUS EVERY 24 HOURS
Refills: 0 | Status: DISCONTINUED | OUTPATIENT
Start: 2022-10-19 | End: 2022-10-21

## 2022-10-19 RX ORDER — MUPIROCIN 20 MG/G
1 OINTMENT TOPICAL
Refills: 0 | Status: DISCONTINUED | OUTPATIENT
Start: 2022-10-19 | End: 2022-10-24

## 2022-10-19 RX ADMIN — CEFTRIAXONE 100 MILLIGRAM(S): 500 INJECTION, POWDER, FOR SOLUTION INTRAMUSCULAR; INTRAVENOUS at 18:59

## 2022-10-19 RX ADMIN — SODIUM CHLORIDE 30 MILLILITER(S): 9 INJECTION INTRAMUSCULAR; INTRAVENOUS; SUBCUTANEOUS at 11:55

## 2022-10-19 RX ADMIN — MUPIROCIN 1 APPLICATION(S): 20 OINTMENT TOPICAL at 17:47

## 2022-10-19 NOTE — DIETITIAN INITIAL EVALUATION ADULT - OTHER INFO
89F dementia (AAOx1, can communicate if pain, follows selective simple commands, no complex commands), pAfib (not on AC), cerebral artery occlusion, ?chf (EF unknown), HTN, HLD, CKD (unclear baseline Cr), recently hospitalized for urosepsis c/b sacral decub (early 9/2022), subsequently tx for recurrent uti s/p bactrim (9/26/22), referred in by dr meyer's office for <24h delirium.     Pt seen in room for nutrition assessment. Family at bedside. Pt with reported good appetite at baseline, no chewing/swallowing difficulties. Allergy to uri. No cultural, ethnic, Mosque food preferences noted. Currently on regular diet, eating oatmeal for breakfast this morning. Family bringing in outside food for lunch. Pt with reported stable wt and current wt ~104lbs. Age related muscle loss noted, do not suspect malnutrition at this time. Unstageable pressure injury to sacrum. No edema noted. No N/V/D/C. RD provided education on importance of protein for wound healing, discussed protein sources. Family expressed understanding. Please see full nutrition recommendations below. Will continue to follow per RD protocol.

## 2022-10-19 NOTE — PATIENT PROFILE ADULT - FALL HARM RISK - HARM RISK INTERVENTIONS

## 2022-10-19 NOTE — PROGRESS NOTE ADULT - PROBLEM SELECTOR PLAN 3
Hx of paroxysmal afib  Not on anticoagulation, CHADSVASc 7 (HTN, hx of cerebral artery occ, ?CHF history, female)  Plan:  -cont to monitor Hx of paroxysmal afib  Not on anticoagulation, CHADSVASc 7 (HTN, hx of cerebral artery occ, CHF history, female)  Plan:  -cont to monitor

## 2022-10-19 NOTE — H&P ADULT - PROBLEM SELECTOR PLAN 2
2/2 to UTI - IMPROVING  CTH shows progressive volume loss, negative for acute infarct, ICH  Plan:  - continue to monitor  - f/u b12, folate, TSH

## 2022-10-19 NOTE — CHART NOTE - NSCHARTNOTEFT_GEN_A_CORE
GOC Discussion:     The individuals present were her daughter and grand daughter. The following Advance Directives or Health Care Proxy documents were identified and/or completed: chest compressions, pressors, intubation. The daughter and grand-daughter determined that they would like to undertake all measures and would like to be full code.

## 2022-10-19 NOTE — H&P ADULT - PROBLEM SELECTOR PLAN 1
Presenting with 100.1F, from [facility] reported to have UTI s/p CTX and 750ccs IVF  UA in ED+ cloudy w/ LE, WBC   Plan:  - f/u urine cultures  - c/w CTX x3d  - monitor VSS  - has primafit, bladder scans as necessary

## 2022-10-19 NOTE — DIETITIAN INITIAL EVALUATION ADULT - PERTINENT MEDS FT
MEDICATIONS  (STANDING):  atorvastatin 10 milliGRAM(s) Oral at bedtime  cefTRIAXone   IVPB 1000 milliGRAM(s) IV Intermittent every 24 hours  memantine 5 milliGRAM(s) Oral at bedtime  mupirocin 2% Ointment 1 Application(s) Topical two times a day  pantoprazole    Tablet 40 milliGRAM(s) Oral before breakfast  sodium chloride 0.9%. 1000 milliLiter(s) (30 mL/Hr) IV Continuous <Continuous>    MEDICATIONS  (PRN):  acetaminophen     Tablet .. 650 milliGRAM(s) Oral every 6 hours PRN Temp greater or equal to 38C (100.4F), Mild Pain (1 - 3)

## 2022-10-19 NOTE — ED ADULT NURSE NOTE - OBJECTIVE STATEMENT
Pt is a 89yoF sent to ED from pcp for uti with ams x today. Pt is axox1 at baseline, on assessment, patient able to follow simple commands. Patient was seen at OSH where she was dx with uti and started on abx, sent here for worsening ams. Patient with low grade fever on arrival, medicated as ordered, labs sent. Pt is a 89yoF sent to ED from pcp for uti with ams x today. Pt is axox1 at baseline, on assessment, patient able to follow simple commands. Patient was seen at OSH where she was dx with uti and started on abx, sent here for worsening ams. Pt also known to have sacral decub, on exam, does not have active drainage, no bleeding noted. Patient with low grade fever on arrival, medicated as ordered, labs sent.

## 2022-10-19 NOTE — ED ADULT NURSE REASSESSMENT NOTE - NS ED NURSE REASSESS COMMENT FT1
Patient awaiting bed assignment, family made aware. Patient care transferred to [facility].
Patient straight cath'd for urine study, tolerated well. Perineal care provided. Admitted and awaiting bed assignment, family at bedside made aware.

## 2022-10-19 NOTE — H&P ADULT - NSICDXPASTMEDICALHX_GEN_ALL_CORE_FT
PAST MEDICAL HISTORY:  AS (aortic stenosis)     Atrial fibrillation No AC.  Pt. currently SR    Cerebral artery occlusion with cerebral infarction CVA (cerebral infarction)    Essential hypertension HTN (hypertension)    [place]    Hyperlipidemia HLD (hyperlipidemia)    Memory loss Memory loss    Sinusitis     UTI (urinary tract infection)

## 2022-10-19 NOTE — H&P ADULT - HISTORY OF PRESENT ILLNESS
PCP: Dr Meyer  - - - - -  HPI: This is a 89F dementia (AAOx1, can communicate if pain, follows selective simple commands, no complex commands), pAfib (not on AC), cerebral artery occlusion, ?chf (EF unknown), HTN, HLD, CKD, recently hospitalized for urosepsis c/b sacral decub (early 9/2022), subsequently tx for recurrent uti s/p bactrim (9/26/22), referred in by Dr meyer's office for <24h delirium. Patient is presenting from home and was noted by family to be lethargic for one day. She was seen at [facility] earlier today, found to have UTI on UA with Urine Cx pending. Recieved CTX and 750cc IVF.  Per PCP/wound care, good healing of sacral decub w/o cellulitis. No reports of fever/chills, no uri/cough, no cp/sob, no abd pain/n/v, no diarrhea, no dysuria, no rash, no trauma.    In the ED:  Initial vital signs: T: 100.1F, HR: 78, BP: 129/79, R: 17, SpO2: 96% on RA  ED course:   Labs: Hgb 9, UA +LE, WBC and blood, COVID negative  Imaging: CTH global atrophy and progressive volume loss  CXR: No consolidations, effusions appreciated  EKG: NSR, LAD, no ST changes  Medications: Tylenol  Consults: none

## 2022-10-19 NOTE — H&P ADULT - NSHPLABSRESULTS_GEN_ALL_CORE
LABS:                         9.3    9.08  )-----------( 202      ( 18 Oct 2022 22:20 )             28.8     10-18    135  |  99  |  25<H>  ----------------------------<  107<H>  4.2   |  26  |  0.86    Ca    8.9      18 Oct 2022 22:20  Mg     1.9     10-18    TPro  6.8  /  Alb  2.9<L>  /  TBili  0.3  /  DBili  x   /  AST  43<H>  /  ALT  30  /  AlkPhos  178<H>  10-18    PT/INR - ( 18 Oct 2022 22:20 )   PT: 13.2 sec;   INR: 1.11          PTT - ( 18 Oct 2022 22:20 )  PTT:30.0 sec  Urinalysis Basic - ( 19 Oct 2022 00:32 )    Color: Yellow / Appearance: SL Cloudy / S.020 / pH: x  Gluc: x / Ketone: NEGATIVE  / Bili: Negative / Urobili: 0.2 E.U./dL   Blood: x / Protein: 30 mg/dL / Nitrite: NEGATIVE   Leuk Esterase: Large / RBC: < 5 /HPF / WBC Many /HPF   Sq Epi: x / Non Sq Epi: 0-5 /HPF / Bacteria: Many /HPF            Lactate, Blood: 1.3 mmol/L (10-18 @ 22:20)      RADIOLOGY, EKG & ADDITIONAL TESTS:

## 2022-10-19 NOTE — DIETITIAN INITIAL EVALUATION ADULT - NSFNSPHYEXAMSKINFT_GEN_A_CORE
Pressure Injury 1: sacrum, Unstageable  Pressure Injury 2: none, none  Pressure Injury 3: none, none  Pressure Injury 4: none, none  Pressure Injury 5: none, none  Pressure Injury 6: none, none  Pressure Injury 7: none, none  Pressure Injury 8: none, none  Pressure Injury 9: none, none  Pressure Injury 10: none, none  Pressure Injury 11: none, none Pressure Injury 1: sacrum, Unstageable

## 2022-10-19 NOTE — PROGRESS NOTE ADULT - PROBLEM SELECTOR PLAN 2
2/2 to UTI - IMPROVING  CTH shows progressive volume loss, negative for acute infarct, ICH  Plan:  - continue to monitor  - f/u b12, folate, TSH 2/2 to UTI - IMPROVING. Patient was lethargic in ED but improved once increased PO intake w abx.   CTH shows progressive volume loss, negative for acute infarct, ICH  Plan:  - continue to monitor  - f/u b12, folate, TSH

## 2022-10-19 NOTE — DIETITIAN INITIAL EVALUATION ADULT - ADD RECOMMEND
1. Continue regular diet, honor food preferences as able  2. MVI, zinc, vit C for wound healing  3. Monitor need for oral nutrition supplement

## 2022-10-19 NOTE — H&P ADULT - NSHPPHYSICALEXAM_GEN_ALL_CORE
PHYSICAL EXAM:  Constitutional: NAD, comfortable in bed. Frail, elderly  HEENT: NC/AT, PERRLA, EOMI, no conjunctival pallor or scleral icterus, mucus membranes moist.  Neck: Supple, no JVD. My thyromegaly or masses.  Respiratory: Clear to auscultation B/L. No wheezing, rales, rhonchi.  Cardiovascular: Irregular, normal S1 and S2, no murmurs, rubs, gallops.  Gastrointestinal: +BS, soft NTND, no guarding or rebound tenderness, no palpable masses.  Extremities: +2 b/l pitting edema up to shins. Warm well perfused. No cyanosis, clubbing.  Vascular: +2 pulses equal and strong throughout.   Neurological: AAOx0, follows movements and simple commands, unable to obtain full neuro exam  Skin: Dry skin.

## 2022-10-19 NOTE — PROGRESS NOTE ADULT - PROBLEM SELECTOR PLAN 1
Presenting with 100.1F, from [facility] reported to have UTI s/p CTX and 750ccs IVF  UA in ED+ cloudy w/ LE, WBC   Plan:  - f/u urine cultures  - c/w CTX x3d  - monitor VSS  - has primafit, bladder scans as necessary Presenting with 100.1F, from [facility] reported to have UTI s/p CTX and 750ccs IVF. UA in ED+ cloudy w/ LE, WBC  Patient continues to be afebrile was placed on 30cc/hr for 12 hr due to hypotension (systolic 90s) with improvement, however patient's family requested to DC fluids. GOC discussion had - full code. Patient's sacral ulcer inspected with Dr. Toussaint - not infected, UTI more likely source.    Plan:  - f/u urine cultures  - c/w IV CTX x3d (received one dose outpatient 10/18 night IV, second dose will be inpatient 10/19 night)   - monitor VSS  - has primafit, bladder scans as necessary  - Patient had outpatient blood cultures done prior to abx dose, f/u once resulted and inform Dr. Toussaint (contact in handoff)

## 2022-10-19 NOTE — H&P ADULT - PROBLEM SELECTOR PLAN 3
Hx of paroxysmal afib  Not on anticoagulation, CHADSVASc 7 (HTN, hx of cerebral artery occ, ?CHF history, female)  Plan:  -cont to monitor

## 2022-10-19 NOTE — H&P ADULT - ASSESSMENT
89F dementia (AAOx1, can communicate if pain, follows selective simple commands, no complex commands), pAfib (not on AC), cerebral artery occlusion, ?chf (EF unknown), HTN, HLD, CKD (unclear baseline Cr), recently hospitalized for urosepsis c/b sacral decub (early 9/2022), subsequently tx for recurrent uti s/p bactrim (9/26/22), referred in by dr meyer's office for <24h delirium.

## 2022-10-19 NOTE — PATIENT PROFILE ADULT - NSPROPTRIGHTNOTIFY_GEN_A_NUR
----- Message from Gen Melendez MD sent at 10/22/2019 10:44 AM EDT -----  Please let her know that her repeat thyroid test is normal.  Continue with current dose of the thyroid medication. declines

## 2022-10-19 NOTE — DIETITIAN INITIAL EVALUATION ADULT - PERTINENT LABORATORY DATA
10-19    138  |  100  |  27<H>  ----------------------------<  99  3.6   |  28  |  0.95    Ca    8.8      19 Oct 2022 07:16  Phos  2.9     10-19  Mg     1.8     10-19    TPro  6.3  /  Alb  3.2<L>  /  TBili  0.4  /  DBili  x   /  AST  30  /  ALT  26  /  AlkPhos  175<H>  10-19

## 2022-10-19 NOTE — PROGRESS NOTE ADULT - SUBJECTIVE AND OBJECTIVE BOX
**incomplete** Patient is unable to communicate but history obtained from family at bedside ---  Family report that the patient has no abdominal pain, n/v/d, cp, subjective fever but is feeling cold now at the hospital.  PO intake has decreased since 10/18  The patient has no dysuria, pyuria, or hematuria however the patients urine output has decreased per the family at bedside       T(C): 36.4 (10-19-22 @ 16:36), Max: 37.9 (10-19-22 @ 06:32)  HR: 69 (10-19-22 @ 16:36) (60 - 78)  BP: 137/72 (10-19-22 @ 16:36) (98/57 - 155/86)  RR: 18 (10-19-22 @ 16:36) (16 - 18)  SpO2: 92% (10-19-22 @ 16:36) (92% - 96%)  Wt(kg): --Vital Signs Last 24 Hrs  T(C): 36.4 (19 Oct 2022 16:36), Max: 37.9 (19 Oct 2022 06:32)  T(F): 97.5 (19 Oct 2022 16:36), Max: 100.3 (19 Oct 2022 06:32)  HR: 69 (19 Oct 2022 16:36) (60 - 78)  BP: 137/72 (19 Oct 2022 16:36) (98/57 - 155/86)  BP(mean): 95 (19 Oct 2022 02:56) (95 - 99)  RR: 18 (19 Oct 2022 16:36) (16 - 18)  SpO2: 92% (19 Oct 2022 16:36) (92% - 96%)    Parameters below as of 19 Oct 2022 16:36  Patient On (Oxygen Delivery Method): room air        PHYSICAL EXAM:  GENERAL: NAD; appears slightly lethargic, however per family improved from 10/18 and close to BL  Neuro: CN2-12 grossly intact; speech clear;  Heart: RRR; +s1 and s2; no MRG  Lungs: CTA b/l; normal effort; no accesory muscle us  GI: nondistended; nontender; normal bowel sounds [id number]; percussion typmanic; no organomegaly   Extremities: +2 pulses in UE and LE  Skin: skin dry and warm; no skin tenting; no rashes or lesions  MSK: normal tone; +5/5 strength in upper and lower extremities b/l    Consultant(s) Notes Reviewed:  [x ] YES  [ ] NO  Care Discussed with Consultants/Other Providers [ x] YES  [ ] NO    LABS:                        9.2    6.85  )-----------( 198      ( 19 Oct 2022 07:16 )             28.2     10-19    138  |  100  |  27<H>  ----------------------------<  99  3.6   |  28  |  0.95    Ca    8.8      19 Oct 2022 07:16  Phos  2.9     10  Mg     1.8     10-19    TPro  6.3  /  Alb  3.2<L>  /  TBili  0.4  /  DBili  x   /  AST  30  /  ALT  26  /  AlkPhos  175<H>  10-19      PT/INR - ( 18 Oct 2022 22:20 )   PT: 13.2 sec;   INR: 1.11          PTT - ( 18 Oct 2022 22:20 )  PTT:30.0 sec  Urinalysis Basic - ( 19 Oct 2022 00:32 )    Color: Yellow / Appearance: SL Cloudy / S.020 / pH: x  Gluc: x / Ketone: NEGATIVE  / Bili: Negative / Urobili: 0.2 E.U./dL   Blood: x / Protein: 30 mg/dL / Nitrite: NEGATIVE   Leuk Esterase: Large / RBC: < 5 /HPF / WBC Many /HPF   Sq Epi: x / Non Sq Epi: 0-5 /HPF / Bacteria: Many /HPF      CAPILLARY BLOOD GLUCOSE            Urinalysis Basic - ( 19 Oct 2022 00:32 )    Color: Yellow / Appearance: SL Cloudy / S.020 / pH: x  Gluc: x / Ketone: NEGATIVE  / Bili: Negative / Urobili: 0.2 E.U./dL   Blood: x / Protein: 30 mg/dL / Nitrite: NEGATIVE   Leuk Esterase: Large / RBC: < 5 /HPF / WBC Many /HPF   Sq Epi: x / Non Sq Epi: 0-5 /HPF / Bacteria: Many /HPF        RADIOLOGY & ADDITIONAL TESTS:    Imaging Personally Reviewed:  [ ] YES  [ ] NO

## 2022-10-20 ENCOUNTER — TRANSCRIPTION ENCOUNTER (OUTPATIENT)
Age: 87
End: 2022-10-20

## 2022-10-20 LAB
ALBUMIN SERPL ELPH-MCNC: 2.5 G/DL — LOW (ref 3.3–5)
ALP SERPL-CCNC: 153 U/L — HIGH (ref 40–120)
ALT FLD-CCNC: 25 U/L — SIGNIFICANT CHANGE UP (ref 10–45)
ANION GAP SERPL CALC-SCNC: 8 MMOL/L — SIGNIFICANT CHANGE UP (ref 5–17)
AST SERPL-CCNC: 29 U/L — SIGNIFICANT CHANGE UP (ref 10–40)
BASOPHILS # BLD AUTO: 0.03 K/UL — SIGNIFICANT CHANGE UP (ref 0–0.2)
BASOPHILS NFR BLD AUTO: 0.5 % — SIGNIFICANT CHANGE UP (ref 0–2)
BILIRUB SERPL-MCNC: 0.2 MG/DL — SIGNIFICANT CHANGE UP (ref 0.2–1.2)
BUN SERPL-MCNC: 21 MG/DL — SIGNIFICANT CHANGE UP (ref 7–23)
CALCIUM SERPL-MCNC: 8.6 MG/DL — SIGNIFICANT CHANGE UP (ref 8.4–10.5)
CHLORIDE SERPL-SCNC: 100 MMOL/L — SIGNIFICANT CHANGE UP (ref 96–108)
CO2 SERPL-SCNC: 26 MMOL/L — SIGNIFICANT CHANGE UP (ref 22–31)
CREAT SERPL-MCNC: 0.93 MG/DL — SIGNIFICANT CHANGE UP (ref 0.5–1.3)
EGFR: 57 ML/MIN/1.73M2 — LOW
EOSINOPHIL # BLD AUTO: 0.13 K/UL — SIGNIFICANT CHANGE UP (ref 0–0.5)
EOSINOPHIL NFR BLD AUTO: 2 % — SIGNIFICANT CHANGE UP (ref 0–6)
GLUCOSE SERPL-MCNC: 94 MG/DL — SIGNIFICANT CHANGE UP (ref 70–99)
HCT VFR BLD CALC: 27 % — LOW (ref 34.5–45)
HGB BLD-MCNC: 8.7 G/DL — LOW (ref 11.5–15.5)
IMM GRANULOCYTES NFR BLD AUTO: 0.5 % — SIGNIFICANT CHANGE UP (ref 0–0.9)
LYMPHOCYTES # BLD AUTO: 0.85 K/UL — LOW (ref 1–3.3)
LYMPHOCYTES # BLD AUTO: 12.9 % — LOW (ref 13–44)
MAGNESIUM SERPL-MCNC: 1.8 MG/DL — SIGNIFICANT CHANGE UP (ref 1.6–2.6)
MCHC RBC-ENTMCNC: 28.6 PG — SIGNIFICANT CHANGE UP (ref 27–34)
MCHC RBC-ENTMCNC: 32.2 GM/DL — SIGNIFICANT CHANGE UP (ref 32–36)
MCV RBC AUTO: 88.8 FL — SIGNIFICANT CHANGE UP (ref 80–100)
MONOCYTES # BLD AUTO: 0.93 K/UL — HIGH (ref 0–0.9)
MONOCYTES NFR BLD AUTO: 14.1 % — HIGH (ref 2–14)
NEUTROPHILS # BLD AUTO: 4.62 K/UL — SIGNIFICANT CHANGE UP (ref 1.8–7.4)
NEUTROPHILS NFR BLD AUTO: 70 % — SIGNIFICANT CHANGE UP (ref 43–77)
NRBC # BLD: 0 /100 WBCS — SIGNIFICANT CHANGE UP (ref 0–0)
PHOSPHATE SERPL-MCNC: 2.6 MG/DL — SIGNIFICANT CHANGE UP (ref 2.5–4.5)
PLATELET # BLD AUTO: 192 K/UL — SIGNIFICANT CHANGE UP (ref 150–400)
POTASSIUM SERPL-MCNC: 3.6 MMOL/L — SIGNIFICANT CHANGE UP (ref 3.5–5.3)
POTASSIUM SERPL-SCNC: 3.6 MMOL/L — SIGNIFICANT CHANGE UP (ref 3.5–5.3)
PROT SERPL-MCNC: 5.5 G/DL — LOW (ref 6–8.3)
RBC # BLD: 3.04 M/UL — LOW (ref 3.8–5.2)
RBC # FLD: 16.6 % — HIGH (ref 10.3–14.5)
SODIUM SERPL-SCNC: 134 MMOL/L — LOW (ref 135–145)
WBC # BLD: 6.59 K/UL — SIGNIFICANT CHANGE UP (ref 3.8–10.5)
WBC # FLD AUTO: 6.59 K/UL — SIGNIFICANT CHANGE UP (ref 3.8–10.5)

## 2022-10-20 RX ORDER — MEMANTINE HYDROCHLORIDE 10 MG/1
5 TABLET ORAL
Refills: 0 | Status: DISCONTINUED | OUTPATIENT
Start: 2022-10-20 | End: 2022-10-24

## 2022-10-20 RX ORDER — ASPIRIN/CALCIUM CARB/MAGNESIUM 324 MG
81 TABLET ORAL DAILY
Refills: 0 | Status: DISCONTINUED | OUTPATIENT
Start: 2022-10-20 | End: 2022-10-24

## 2022-10-20 RX ADMIN — PANTOPRAZOLE SODIUM 40 MILLIGRAM(S): 20 TABLET, DELAYED RELEASE ORAL at 07:44

## 2022-10-20 RX ADMIN — ATORVASTATIN CALCIUM 10 MILLIGRAM(S): 80 TABLET, FILM COATED ORAL at 22:34

## 2022-10-20 RX ADMIN — Medication 81 MILLIGRAM(S): at 14:47

## 2022-10-20 RX ADMIN — MEMANTINE HYDROCHLORIDE 5 MILLIGRAM(S): 10 TABLET ORAL at 22:34

## 2022-10-20 RX ADMIN — CEFTRIAXONE 100 MILLIGRAM(S): 500 INJECTION, POWDER, FOR SOLUTION INTRAMUSCULAR; INTRAVENOUS at 18:18

## 2022-10-20 RX ADMIN — MEMANTINE HYDROCHLORIDE 5 MILLIGRAM(S): 10 TABLET ORAL at 12:48

## 2022-10-20 RX ADMIN — Medication 1000 UNIT(S): at 11:39

## 2022-10-20 NOTE — PROGRESS NOTE ADULT - SUBJECTIVE AND OBJECTIVE BOX
Pt seen and examined   more alert today      REVIEW OF SYSTEMS:  dementia      MEDICATIONS:  MEDICATIONS  (STANDING):  atorvastatin 10 milliGRAM(s) Oral at bedtime  cefTRIAXone   IVPB 1000 milliGRAM(s) IV Intermittent every 24 hours  cholecalciferol 1000 Unit(s) Oral daily  memantine 5 milliGRAM(s) Oral two times a day  mupirocin 2% Ointment 1 Application(s) Topical two times a day  pantoprazole    Tablet 40 milliGRAM(s) Oral before breakfast    MEDICATIONS  (PRN):  acetaminophen     Tablet .. 650 milliGRAM(s) Oral every 6 hours PRN Temp greater or equal to 38C (100.4F), Mild Pain (1 - 3)  fluticasone propionate 50 MICROgram(s)/spray Nasal Spray 1 Spray(s) Both Nostrils two times a day PRN congestion  simethicone 80 milliGRAM(s) Chew daily PRN Indigestion      Allergies    doxycycline (Unknown)  [place] (Unknown)    Intolerances    hydrochlorothiazide (Other)      Vital Signs Last 24 Hrs  T(C): 36.4 (20 Oct 2022 12:13), Max: 37.4 (20 Oct 2022 01:01)  T(F): 97.5 (20 Oct 2022 12:13), Max: 99.4 (20 Oct 2022 01:01)  HR: 71 (20 Oct 2022 12:13) (62 - 71)  BP: 118/74 (20 Oct 2022 12:13) (118/74 - 139/84)  BP(mean): --  RR: 18 (20 Oct 2022 12:13) (16 - 20)  SpO2: 96% (20 Oct 2022 12:13) (92% - 97%)    Parameters below as of 20 Oct 2022 12:13  Patient On (Oxygen Delivery Method): room air        10-19 @ 07:01  -  10-20 @ 07:00  --------------------------------------------------------  IN: 0 mL / OUT: 500 mL / NET: -500 mL        PHYSICAL EXAM:    General:  in no acute distress  HEENT: MMM, conjunctiva and sclera clear  Lungs: clear  Heart: + murmur  Gastrointestinal: Soft non-tender non-distended; Normal bowel sounds;   Skin: Warm and dry. No obvious rash    LABS:      CBC Full  -  ( 20 Oct 2022 06:57 )  WBC Count : 6.59 K/uL  RBC Count : 3.04 M/uL  Hemoglobin : 8.7 g/dL  Hematocrit : 27.0 %  Platelet Count - Automated : 192 K/uL  Mean Cell Volume : 88.8 fl  Mean Cell Hemoglobin : 28.6 pg  Mean Cell Hemoglobin Concentration : 32.2 gm/dL  Auto Neutrophil # : 4.62 K/uL  Auto Lymphocyte # : 0.85 K/uL  Auto Monocyte # : 0.93 K/uL  Auto Eosinophil # : 0.13 K/uL  Auto Basophil # : 0.03 K/uL  Auto Neutrophil % : 70.0 %  Auto Lymphocyte % : 12.9 %  Auto Monocyte % : 14.1 %  Auto Eosinophil % : 2.0 %  Auto Basophil % : 0.5 %    10-20    134<L>  |  100  |  21  ----------------------------<  94  3.6   |  26  |  0.93    Ca    8.6      20 Oct 2022 06:57  Phos  2.6     10-20  Mg     1.8     10-20    TPro  5.5<L>  /  Alb  2.5<L>  /  TBili  0.2  /  DBili  x   /  AST  29  /  ALT  25  /  AlkPhos  153<H>  10-20    PT/INR - ( 18 Oct 2022 22:20 )   PT: 13.2 sec;   INR: 1.11          PTT - ( 18 Oct 2022 22:20 )  PTT:30.0 sec      Urinalysis Basic - ( 19 Oct 2022 00:32 )    Color: Yellow / Appearance: SL Cloudy / S.020 / pH: x  Gluc: x / Ketone: NEGATIVE  / Bili: Negative / Urobili: 0.2 E.U./dL   Blood: x / Protein: 30 mg/dL / Nitrite: NEGATIVE   Leuk Esterase: Large / RBC: < 5 /HPF / WBC Many /HPF   Sq Epi: x / Non Sq Epi: 0-5 /HPF / Bacteria: Many /HPF                RADIOLOGY & ADDITIONAL STUDIES (The following images were personally reviewed):

## 2022-10-20 NOTE — PROGRESS NOTE ADULT - ASSESSMENT
sepsis likely urine source  appreciate ID  wound care  PT per family request  await culture results 89F dementia (AAOx1, can communicate if pain, follows selective simple commands, no complex commands), pAfib (not on AC), cerebral artery occlusion, ?chf (EF unknown), HTN, HLD, CKD (unclear baseline Cr), recently hospitalized for urosepsis c/b sacral decub (early 9/2022), subsequently tx for recurrent uti s/p bactrim (9/26/22), referred in by dr meyer's office for <24h delirium.

## 2022-10-20 NOTE — ADVANCED PRACTICE NURSE CONSULT - ASSESSMENT
This is a 89F dementia (AAOx1, can communicate if pain, follows selective simple commands, no complex commands), pAfib (not on AC), cerebral artery occlusion, ?chf (EF unknown), HTN, HLD, CKD, recently hospitalized for urosepsis c/b sacral decub (early 9/2022), subsequently tx for recurrent uti s/p bactrim (9/26/22), referred in by Dr meyer's office for <24h delirium. Patient is presenting from home and was noted by family to be lethargic for one day. She was seen at [facility] earlier today, found to have UTI on UA with Urine Cx pending. Pt admitted with pressure injury to sacrum measuring 4 x 1.5 x 1 cm with undermining 2 cm from 7-11 o'clock. Wound irrigated with NS then Cavilon barrier wipe applied to periwound, ribbon of Aquacel Extra lightly packed into wound bed, covered with foam dressing. Extra supplies left at bedside.

## 2022-10-20 NOTE — PROGRESS NOTE ADULT - SUBJECTIVE AND OBJECTIVE BOX
**incomplete**    This is a 89F dementia (AAOx1, can communicate if pain, follows selective simple commands, no complex commands), pAfib (not on AC), cerebral artery occlusion, ?chf (EF unknown), HTN, HLD, CKD, recently hospitalized for urosepsis c/b sacral decub (early 9/2022), subsequently tx for recurrent uti s/p bactrim (9/26/22), referred in by Dr meyer's office for <24h delirium. Patient is presenting from home and was noted by family to be lethargic for one day. She was seen at [facility] earlier today, found to have UTI on UA with Urine Cx pending. Received CTX and 750cc IVF.  Per PCP/wound care, good healing of sacral decub w/o cellulitis. No reports of fever/chills, no uri/cough, no cp/sob, no abd pain/n/v, no diarrhea, no dysuria, no rash, no trauma.        REVIEW OF SYSTEMS:  unable    PAST MEDICAL & SURGICAL HISTORY:  Memory loss  Memory loss      Fall  Falls      Cerebral artery occlusion with cerebral infarction  CVA (cerebral infarction)      Hyperlipidemia  HLD (hyperlipidemia)      Essential hypertension  HTN (hypertension)      UTI (urinary tract infection)      Sinusitis      Atrial fibrillation  No AC.  Pt. currently SR      AS (aortic stenosis)      No significant past surgical history          FAMILY HISTORY:      SOCIAL HISTORY:  Smoking Status: [ ] Current, [ ] Former, [ ] Never  Pack Years:    MEDICATIONS:  MEDICATIONS  (STANDING):  atorvastatin 10 milliGRAM(s) Oral at bedtime  cefTRIAXone   IVPB 1000 milliGRAM(s) IV Intermittent every 24 hours  cholecalciferol 1000 Unit(s) Oral daily  memantine 5 milliGRAM(s) Oral at bedtime  mupirocin 2% Ointment 1 Application(s) Topical two times a day  pantoprazole    Tablet 40 milliGRAM(s) Oral before breakfast    MEDICATIONS  (PRN):  acetaminophen     Tablet .. 650 milliGRAM(s) Oral every 6 hours PRN Temp greater or equal to 38C (100.4F), Mild Pain (1 - 3)  fluticasone propionate 50 MICROgram(s)/spray Nasal Spray 1 Spray(s) Both Nostrils two times a day PRN congestion  simethicone 80 milliGRAM(s) Chew daily PRN Indigestion      Allergies    doxycycline (Unknown)  [place] (Unknown)    Intolerances    hydrochlorothiazide (Other)      Vital Signs Last 24 Hrs  T(C): 36.4 (19 Oct 2022 16:36), Max: 37.9 (19 Oct 2022 06:32)  T(F): 97.5 (19 Oct 2022 16:36), Max: 100.3 (19 Oct 2022 06:32)  HR: 69 (19 Oct 2022 16:36) (60 - 78)  BP: 137/72 (19 Oct 2022 16:36) (98/57 - 155/86)  BP(mean): 95 (19 Oct 2022 02:56) (95 - 99)  RR: 18 (19 Oct 2022 16:36) (16 - 18)  SpO2: 92% (19 Oct 2022 16:36) (92% - 96%)    Parameters below as of 19 Oct 2022 16:36  Patient On (Oxygen Delivery Method): room air        10-18 @ 07:01  -  10-19 @ 07:00  --------------------------------------------------------  IN: 0 mL / OUT: 300 mL / NET: -300 mL          PHYSICAL EXAM:    General:  in no acute distress  HEENT: MMM, conjunctiva and sclera clear  Lungs: poor inspiration but clear  Heart: regular  Gastrointestinal: Soft, non-tender non-distended; Normal bowel sounds; No rebound or guarding  Extremities: Normal range of motion, No clubbing, cyanosis or edema  Neurological: sleepy  Skin: Warm and dry. grade 3 pressure sore      LABS:                        9.2    6.85  )-----------( 198      ( 19 Oct 2022 07:16 )             28.2     10-19    138  |  100  |  27<H>  ----------------------------<  99  3.6   |  28  |  0.95    Ca    8.8      19 Oct 2022 07:16  Phos  2.9     10-19  Mg     1.8     10-19    TPro  6.3  /  Alb  3.2<L>  /  TBili  0.4  /  DBili  x   /  AST  30  /  ALT  26  /  AlkPhos  175<H>  10-19          RADIOLOGY & ADDITIONAL STUDIES:    Patient not good communicator at bedside, unable to get good subjective history     SAVANAH ISAAC  89y  Female    Patient is a 89y old  Female who presents with a chief complaint of Delirium (20 Oct 2022 12:38)      INTERVAL HPI/OVERNIGHT EVENTS:    ROS: fever/chills, fatigue, nausea, vomiting, headache, stuffiness, sore throat, chest pain, palpitations, edema, SOB, cough, wheezing, changes in appetite, changes in bowel movements, contipation, diarrhea, abdominal pain, dizziness, fainting/LOC      T(C): 36.4 (10-20-22 @ 12:13), Max: 37.4 (10-20-22 @ 01:01)  HR: 71 (10-20-22 @ 12:13) (62 - 71)  BP: 118/74 (10-20-22 @ 12:13) (118/74 - 139/84)  RR: 18 (10-20-22 @ 12:13) (16 - 20)  SpO2: 96% (10-20-22 @ 12:13) (92% - 97%)  Wt(kg): --Vital Signs Last 24 Hrs  T(C): 36.4 (20 Oct 2022 12:13), Max: 37.4 (20 Oct 2022 01:01)  T(F): 97.5 (20 Oct 2022 12:13), Max: 99.4 (20 Oct 2022 01:01)  HR: 71 (20 Oct 2022 12:13) (62 - 71)  BP: 118/74 (20 Oct 2022 12:13) (118/74 - 139/84)  BP(mean): --  RR: 18 (20 Oct 2022 12:13) (16 - 20)  SpO2: 96% (20 Oct 2022 12:13) (92% - 97%)    Parameters below as of 20 Oct 2022 12:13  Patient On (Oxygen Delivery Method): room air        PHYSICAL EXAM:  GENERAL: NAD; well-groomed  Neuro: CN2-12 grossly intact; speech clear  HEENT: NC/AT; MMM; neck supple;   Heart: RRR; +s1 and s2; no MRG   Lungs: CTA b/l; normal effort; no accesory muscle use;   GI: nondistended; nontender;   Extremities: +2 pulses in UE and LE b/l;   Skin: skin dry and warm; no skin tenting;  MSK: normal tone; +5/5 strength in upper and lower extremities b/l      LABS:                        8.7    6.59  )-----------( 192      ( 20 Oct 2022 06:57 )             27.0     10-20    134<L>  |  100  |  21  ----------------------------<  94  3.6   |  26  |  0.93    Ca    8.6      20 Oct 2022 06:57  Phos  2.6     10-20  Mg     1.8     10-20    TPro  5.5<L>  /  Alb  2.5<L>  /  TBili  0.2  /  DBili  x   /  AST  29  /  ALT  25  /  AlkPhos  153<H>  10-20      PT/INR - ( 18 Oct 2022 22:20 )   PT: 13.2 sec;   INR: 1.11          PTT - ( 18 Oct 2022 22:20 )  PTT:30.0 sec  Urinalysis Basic - ( 19 Oct 2022 00:32 )    Color: Yellow / Appearance: SL Cloudy / S.020 / pH: x  Gluc: x / Ketone: NEGATIVE  / Bili: Negative / Urobili: 0.2 E.U./dL   Blood: x / Protein: 30 mg/dL / Nitrite: NEGATIVE   Leuk Esterase: Large / RBC: < 5 /HPF / WBC Many /HPF   Sq Epi: x / Non Sq Epi: 0-5 /HPF / Bacteria: Many /HPF      CAPILLARY BLOOD GLUCOSE            Urinalysis Basic - ( 19 Oct 2022 00:32 )    Color: Yellow / Appearance: SL Cloudy / S.020 / pH: x  Gluc: x / Ketone: NEGATIVE  / Bili: Negative / Urobili: 0.2 E.U./dL   Blood: x / Protein: 30 mg/dL / Nitrite: NEGATIVE   Leuk Esterase: Large / RBC: < 5 /HPF / WBC Many /HPF   Sq Epi: x / Non Sq Epi: 0-5 /HPF / Bacteria: Many /HPF        RADIOLOGY & ADDITIONAL TESTS:    Imaging Personally Reviewed:  [ ] YES  [ ] NO

## 2022-10-20 NOTE — DISCHARGE NOTE PROVIDER - CARE PROVIDER_API CALL
Usman Garcia)  [facility]  [address], [address]  [place] [address]  Phone: [phone]  Fax: [phone]  Follow Up Time: 1 week

## 2022-10-20 NOTE — PHYSICAL THERAPY INITIAL EVALUATION ADULT - GENERAL OBSERVATIONS, REHAB EVAL
pt received/returned semi-supine in bed +heplock, +pure wick, +B/L SCDs, contracted, non-verbal, in NAD, family present

## 2022-10-20 NOTE — PHYSICAL THERAPY INITIAL EVALUATION ADULT - IMPAIRMENTS FOUND, PT EVAL
aerobic capacity/endurance/fine motor/gait, locomotion, and balance/gross motor/joint integrity and mobility/muscle strength/posture

## 2022-10-20 NOTE — PROGRESS NOTE ADULT - PROBLEM SELECTOR PLAN 1
Presenting with 100.1F, from [facility] reported to have UTI s/p CTX and 750ccs IVF. UA in ED+ cloudy w/ LE, WBC  Patient continues to be afebrile was placed on 30cc/hr for 12 hr due to hypotension (systolic 90s) with improvement, however patient's family requested to DC fluids. GOC discussion had - full code. Patient's sacral ulcer inspected with Dr. Toussaint - not infected, UTI more likely source.    Plan:  - f/u urine cultures  - c/w IV CTX x3d (received one dose outpatient 10/18 night IV, second dose will be inpatient 10/19 night), third dose for 10/20 at 6pm  - monitor VSS

## 2022-10-20 NOTE — DISCHARGE NOTE PROVIDER - NSDCCPTREATMENT_GEN_ALL_CORE_FT
PRINCIPAL PROCEDURE  Procedure: CT head wo con  Findings and Treatment: 1. No acute intracranial abnormality.  2. Atrophy and sequela of chronic microvascular ischemia.  XRCHEST:  The lungs are clear. No pleural effusions. No pneumothorax.  No acute abnormalities of the soft tissues and osseous structures.  IMPRESSION:  No acute pathology.

## 2022-10-20 NOTE — PROGRESS NOTE ADULT - PROBLEM SELECTOR PLAN 3
Hx of paroxysmal afib  Not on anticoagulation, CHADSVASc 7 (HTN, hx of cerebral artery occ, CHF history, female)  Plan:  -cont to monitor

## 2022-10-20 NOTE — DISCHARGE NOTE PROVIDER - NSDCMRMEDTOKEN_GEN_ALL_CORE_FT
Aspirin Enteric Coated 81 mg oral delayed release tablet: 1 tab(s) orally once a day  atorvastatin 10 mg oral tablet: 1 tab(s) orally once a day  Flonase 50 mcg/inh nasal spray: 1 spray(s) intranasally once a day, As Needed -for congestion   galantamine 24 mg oral capsule, extended release: 1 cap(s) orally once a day (in the morning)  Namenda 5 mg oral tablet: 1 tab(s) orally 2 times a day  pantoprazole 40 mg oral delayed release tablet: 1 tab(s) orally once a day (before a meal)  simethicone 80 mg oral tablet, chewable: 1 tab(s) orally once a day, As needed, Indigestion  torsemide 5 mg oral tablet: 1 tab every other day  Vitamin D3 1000 intl units oral tablet: 1 tab(s) orally once a day   Aspirin Enteric Coated 81 mg oral delayed release tablet: 1 tab(s) orally once a day  atorvastatin 10 mg oral tablet: 1 tab(s) orally once a day  cefTRIAXone 1 g injection: 1 gram(s) intravenously once a day   Flonase 50 mcg/inh nasal spray: 1 spray(s) intranasally once a day, As Needed -for congestion   galantamine 24 mg oral capsule, extended release: 1 cap(s) orally once a day (in the morning)  Namenda 5 mg oral tablet: 1 tab(s) orally 2 times a day  ocular lubricant ophthalmic solution: 1 drop(s) to each affected eye once a day, As needed, Dry Eyes  pantoprazole 40 mg oral delayed release tablet: 1 tab(s) orally once a day (before a meal)  simethicone 80 mg oral tablet, chewable: 1 tab(s) orally once a day, As needed, Indigestion  Vitamin D3 1000 intl units oral tablet: 1 tab(s) orally once a day

## 2022-10-20 NOTE — PROGRESS NOTE ADULT - PROBLEM SELECTOR PLAN 5
Per family, hx of sacral decub 9/2022 after hospitalization for urosepsis  Plan:  - Wound care seen patient and will pack the wound

## 2022-10-20 NOTE — PHYSICAL THERAPY INITIAL EVALUATION ADULT - ADDITIONAL COMMENTS
social hx given by granddaughter 2/2 pt being non-verbal. as per granddaughter pt as of last week was ambulating w/ use of RW and going out to lunch and participating in PT. pt ambulates w/ rollator and a wheelchair at home

## 2022-10-20 NOTE — DISCHARGE NOTE PROVIDER - NSDCCPCAREPLAN_GEN_ALL_CORE_FT
PRINCIPAL DISCHARGE DIAGNOSIS  Diagnosis: Acute UTI  Assessment and Plan of Treatment: A urinary tract infection (UTI) is an infection of any part of the urinary tract. The urinary tract includes the kidneys, ureters, bladder, and urethra. These organs make, store, and get rid of urine in the body. An upper UTI affects the ureters and kidneys. A lower UTI affects the bladder and urethra. Most urinary tract infections are caused by bacteria in your genital area around your urethra, where urine leaves your body. These bacteria grow and cause inflammation of your urinary tract. Make sure you empty your bladder often and completely. Do not hold urine for long periods of time. Wipe from front to back after urinating or having a bowel movement if you are female. Use each tissue only one time when you wipe. Drink enough fluid to keep your urine pale yellow. Keep all follow-up visits. This is important. Contact a health care provider if: your symptoms do not get better after 1–2 days, your symptoms go away and then return. Get help right away if you have severe pain in your back or your lower abdomen, you have a fever or chills, or you have nausea or vomiting.        SECONDARY DISCHARGE DIAGNOSES  Diagnosis: Acute UTI  Assessment and Plan of Treatment:      PRINCIPAL DISCHARGE DIAGNOSIS  Diagnosis: Acute UTI  Assessment and Plan of Treatment: You will be leaving with a PICC line in so that you may be able to receive your antibiotics.  You will receive a total of 10 day course of antibiotics over the course of 10 days. You began the course while inpatient (10/23) and will continue until (11/1). Therefore, you will receive 8 doses worth of the antibiotic.   A urinary tract infection (UTI) is an infection of any part of the urinary tract. The urinary tract includes the kidneys, ureters, bladder, and urethra. These organs make, store, and get rid of urine in the body. An upper UTI affects the ureters and kidneys. A lower UTI affects the bladder and urethra. Most urinary tract infections are caused by bacteria in your genital area around your urethra, where urine leaves your body. These bacteria grow and cause inflammation of your urinary tract. Make sure you empty your bladder often and completely. Do not hold urine for long periods of time. Wipe from front to back after urinating or having a bowel movement if you are female. Use each tissue only one time when you wipe. Drink enough fluid to keep your urine pale yellow. Keep all follow-up visits. This is important. Contact a health care provider if: your symptoms do not get better after 1–2 days, your symptoms go away and then return. Get help right away if you have severe pain in your back or your lower abdomen, you have a fever or chills, or you have nausea or vomiting.         PRINCIPAL DISCHARGE DIAGNOSIS  Diagnosis: Acute UTI  Assessment and Plan of Treatment: You will be leaving with a PICC line in so that you may be able to receive your antibiotics.  You will receive a total of 10 day course of antibiotics over the course of 10 days. You began the course while inpatient (10/23) and will continue until (11/1). Therefore, you will receive 8 doses worth of the antibiotic.   A urinary tract infection (UTI) is an infection of any part of the urinary tract. The urinary tract includes the kidneys, ureters, bladder, and urethra. These organs make, store, and get rid of urine in the body. An upper UTI affects the ureters and kidneys. A lower UTI affects the bladder and urethra. Most urinary tract infections are caused by bacteria in your genital area around your urethra, where urine leaves your body. These bacteria grow and cause inflammation of your urinary tract. Make sure you empty your bladder often and completely. Do not hold urine for long periods of time. Wipe from front to back after urinating or having a bowel movement if you are female. Use each tissue only one time when you wipe. Drink enough fluid to keep your urine pale yellow. Keep all follow-up visits. This is important. Contact a health care provider if: your symptoms do not get better after 1–2 days, your symptoms go away and then return. Get help right away if you have severe pain in your back or your lower abdomen, you have a fever or chills, or you have nausea or vomiting.  Wound care seen patient, wound irrigated with NS then Cavilon barrier wipe applied to periwound, ribbon of Aquacel Extra lightly packed into wound bed, covered with foam dressing. Extra supplies left at bedside.         PRINCIPAL DISCHARGE DIAGNOSIS  Diagnosis: Acute UTI  Assessment and Plan of Treatment: You will be leaving with a PICC line in so that you may be able to receive your antibiotics.  You will receive a total of 10 day course of antibiotics over the course of 10 days. You began the course while inpatient (10/23) and will continue until (10/31). Therefore, you will receive 8 doses worth of the antibiotic.   A urinary tract infection (UTI) is an infection of any part of the urinary tract. The urinary tract includes the kidneys, ureters, bladder, and urethra. These organs make, store, and get rid of urine in the body. An upper UTI affects the ureters and kidneys. A lower UTI affects the bladder and urethra. Most urinary tract infections are caused by bacteria in your genital area around your urethra, where urine leaves your body. These bacteria grow and cause inflammation of your urinary tract. Make sure you empty your bladder often and completely. Do not hold urine for long periods of time. Wipe from front to back after urinating or having a bowel movement if you are female. Use each tissue only one time when you wipe. Drink enough fluid to keep your urine pale yellow. Keep all follow-up visits. This is important. Contact a health care provider if: your symptoms do not get better after 1–2 days, your symptoms go away and then return. Get help right away if you have severe pain in your back or your lower abdomen, you have a fever or chills, or you have nausea or vomiting.  You will receive your antibiotic infusions with [facility]. You received a dose of ceftriaxone 1g today 10/24. Your NEXT DOSE WILL BE TOMORROW 10/25 at 9AM WITH [facility]. Please ensure that you go to receive your antibiotic infusion.  Follow-up with Dr. Garcia surrounding your symptoms and discuss how you feel. His office will call you surrounding your appointment details.        SECONDARY DISCHARGE DIAGNOSES  Diagnosis: Decubitus ulcer of sacral area  Assessment and Plan of Treatment: Decubitus ulcers, otherwise know as bed sores, are open wounds on the skin, usually found around bony areas of the body. One of these bony areas is the sacral region. The sacral region is near the lower back at the bottom of the spine. This is the location of the sacrum which is a triangular-shaped bone in the pelvis. We consulted wound care during your hospitalization, and they treated the sacral wound present.  Wound care seen patient, wound irrigated with NS then Cavilon barrier wipe applied to periwound, ribbon of Aquacel Extra lightly packed into wound bed, covered with foam dressing.   Please discuss your symptoms and hospitalization with Dr. Garcia during your follow-up appointment.

## 2022-10-20 NOTE — PROGRESS NOTE ADULT - PROBLEM SELECTOR PLAN 2
2/2 to UTI - IMPROVING. Patient was lethargic in ED but improved once increased PO intake w abx.   CTH shows progressive volume loss, negative for acute infarct, ICH. B12 [id number] folate 8  Plan:  - continue to monitor

## 2022-10-21 LAB
ALBUMIN SERPL ELPH-MCNC: 2.6 G/DL — LOW (ref 3.3–5)
ALP SERPL-CCNC: 142 U/L — HIGH (ref 40–120)
ALT FLD-CCNC: 19 U/L — SIGNIFICANT CHANGE UP (ref 10–45)
ANION GAP SERPL CALC-SCNC: 8 MMOL/L — SIGNIFICANT CHANGE UP (ref 5–17)
APPEARANCE UR: CLEAR — SIGNIFICANT CHANGE UP
AST SERPL-CCNC: 19 U/L — SIGNIFICANT CHANGE UP (ref 10–40)
BACTERIA # UR AUTO: SIGNIFICANT CHANGE UP /HPF
BASOPHILS # BLD AUTO: 0.02 K/UL — SIGNIFICANT CHANGE UP (ref 0–0.2)
BASOPHILS NFR BLD AUTO: 0.3 % — SIGNIFICANT CHANGE UP (ref 0–2)
BILIRUB SERPL-MCNC: <0.2 MG/DL — SIGNIFICANT CHANGE UP (ref 0.2–1.2)
BILIRUB UR-MCNC: NEGATIVE — SIGNIFICANT CHANGE UP
BUN SERPL-MCNC: 15 MG/DL — SIGNIFICANT CHANGE UP (ref 7–23)
CALCIUM SERPL-MCNC: 8.2 MG/DL — LOW (ref 8.4–10.5)
CHLORIDE SERPL-SCNC: 101 MMOL/L — SIGNIFICANT CHANGE UP (ref 96–108)
CO2 SERPL-SCNC: 27 MMOL/L — SIGNIFICANT CHANGE UP (ref 22–31)
COLOR SPEC: YELLOW — SIGNIFICANT CHANGE UP
CREAT SERPL-MCNC: 0.84 MG/DL — SIGNIFICANT CHANGE UP (ref 0.5–1.3)
DIFF PNL FLD: ABNORMAL
EGFR: 64 ML/MIN/1.73M2 — SIGNIFICANT CHANGE UP
EOSINOPHIL # BLD AUTO: 0.12 K/UL — SIGNIFICANT CHANGE UP (ref 0–0.5)
EOSINOPHIL NFR BLD AUTO: 2.1 % — SIGNIFICANT CHANGE UP (ref 0–6)
EPI CELLS # UR: SIGNIFICANT CHANGE UP /HPF (ref 0–5)
GLUCOSE SERPL-MCNC: 100 MG/DL — HIGH (ref 70–99)
GLUCOSE UR QL: 250
HCT VFR BLD CALC: 25.7 % — LOW (ref 34.5–45)
HGB BLD-MCNC: 8.3 G/DL — LOW (ref 11.5–15.5)
IMM GRANULOCYTES NFR BLD AUTO: 0.3 % — SIGNIFICANT CHANGE UP (ref 0–0.9)
KETONES UR-MCNC: NEGATIVE — SIGNIFICANT CHANGE UP
LACTATE SERPL-SCNC: 2.9 MMOL/L — HIGH (ref 0.5–2)
LEUKOCYTE ESTERASE UR-ACNC: ABNORMAL
LYMPHOCYTES # BLD AUTO: 0.75 K/UL — LOW (ref 1–3.3)
LYMPHOCYTES # BLD AUTO: 13 % — SIGNIFICANT CHANGE UP (ref 13–44)
MAGNESIUM SERPL-MCNC: 1.8 MG/DL — SIGNIFICANT CHANGE UP (ref 1.6–2.6)
MCHC RBC-ENTMCNC: 28.1 PG — SIGNIFICANT CHANGE UP (ref 27–34)
MCHC RBC-ENTMCNC: 32.3 GM/DL — SIGNIFICANT CHANGE UP (ref 32–36)
MCV RBC AUTO: 87.1 FL — SIGNIFICANT CHANGE UP (ref 80–100)
MONOCYTES # BLD AUTO: 0.77 K/UL — SIGNIFICANT CHANGE UP (ref 0–0.9)
MONOCYTES NFR BLD AUTO: 13.3 % — SIGNIFICANT CHANGE UP (ref 2–14)
NEUTROPHILS # BLD AUTO: 4.09 K/UL — SIGNIFICANT CHANGE UP (ref 1.8–7.4)
NEUTROPHILS NFR BLD AUTO: 71 % — SIGNIFICANT CHANGE UP (ref 43–77)
NITRITE UR-MCNC: NEGATIVE — SIGNIFICANT CHANGE UP
NRBC # BLD: 0 /100 WBCS — SIGNIFICANT CHANGE UP (ref 0–0)
PH UR: 7 — SIGNIFICANT CHANGE UP (ref 5–8)
PHOSPHATE SERPL-MCNC: 2.4 MG/DL — LOW (ref 2.5–4.5)
PLATELET # BLD AUTO: 207 K/UL — SIGNIFICANT CHANGE UP (ref 150–400)
POTASSIUM SERPL-MCNC: 3.8 MMOL/L — SIGNIFICANT CHANGE UP (ref 3.5–5.3)
POTASSIUM SERPL-SCNC: 3.8 MMOL/L — SIGNIFICANT CHANGE UP (ref 3.5–5.3)
PROT SERPL-MCNC: 6 G/DL — SIGNIFICANT CHANGE UP (ref 6–8.3)
PROT UR-MCNC: ABNORMAL MG/DL
RBC # BLD: 2.95 M/UL — LOW (ref 3.8–5.2)
RBC # FLD: 16.4 % — HIGH (ref 10.3–14.5)
RBC CASTS # UR COMP ASSIST: > 10 /HPF
SODIUM SERPL-SCNC: 136 MMOL/L — SIGNIFICANT CHANGE UP (ref 135–145)
SP GR SPEC: 1.01 — SIGNIFICANT CHANGE UP (ref 1–1.03)
UROBILINOGEN FLD QL: 0.2 E.U./DL — SIGNIFICANT CHANGE UP
WBC # BLD: 5.77 K/UL — SIGNIFICANT CHANGE UP (ref 3.8–10.5)
WBC # FLD AUTO: 5.77 K/UL — SIGNIFICANT CHANGE UP (ref 3.8–10.5)
WBC UR QL: > 10 /HPF

## 2022-10-21 PROCEDURE — 71045 X-RAY EXAM CHEST 1 VIEW: CPT | Mod: 26

## 2022-10-21 RX ORDER — ACETAMINOPHEN 500 MG
650 TABLET ORAL EVERY 6 HOURS
Refills: 0 | Status: DISCONTINUED | OUTPATIENT
Start: 2022-10-21 | End: 2022-10-24

## 2022-10-21 RX ORDER — GALANTAMINE HYDROBROMIDE 4 MG/1
4 TABLET, FILM COATED ORAL AT BEDTIME
Refills: 0 | Status: DISCONTINUED | OUTPATIENT
Start: 2022-10-21 | End: 2022-10-24

## 2022-10-21 RX ORDER — PIPERACILLIN AND TAZOBACTAM 4; .5 G/20ML; G/20ML
3.38 INJECTION, POWDER, LYOPHILIZED, FOR SOLUTION INTRAVENOUS ONCE
Refills: 0 | Status: COMPLETED | OUTPATIENT
Start: 2022-10-21 | End: 2022-10-21

## 2022-10-21 RX ORDER — GALANTAMINE HYDROBROMIDE 4 MG/1
12 TABLET, FILM COATED ORAL ONCE
Refills: 0 | Status: COMPLETED | OUTPATIENT
Start: 2022-10-21 | End: 2022-10-21

## 2022-10-21 RX ORDER — GALANTAMINE HYDROBROMIDE 4 MG/1
12 TABLET, FILM COATED ORAL DAILY
Refills: 0 | Status: DISCONTINUED | OUTPATIENT
Start: 2022-10-22 | End: 2022-10-24

## 2022-10-21 RX ORDER — GALANTAMINE HYDROBROMIDE 4 MG/1
12 TABLET, FILM COATED ORAL DAILY
Refills: 0 | Status: DISCONTINUED | OUTPATIENT
Start: 2022-10-21 | End: 2022-10-21

## 2022-10-21 RX ORDER — PIPERACILLIN AND TAZOBACTAM 4; .5 G/20ML; G/20ML
3.38 INJECTION, POWDER, LYOPHILIZED, FOR SOLUTION INTRAVENOUS EVERY 8 HOURS
Refills: 0 | Status: DISCONTINUED | OUTPATIENT
Start: 2022-10-21 | End: 2022-10-23

## 2022-10-21 RX ORDER — VANCOMYCIN HCL 1 G
750 VIAL (EA) INTRAVENOUS ONCE
Refills: 0 | Status: COMPLETED | OUTPATIENT
Start: 2022-10-21 | End: 2022-10-21

## 2022-10-21 RX ADMIN — PIPERACILLIN AND TAZOBACTAM 25 GRAM(S): 4; .5 INJECTION, POWDER, LYOPHILIZED, FOR SOLUTION INTRAVENOUS at 14:23

## 2022-10-21 RX ADMIN — Medication 650 MILLIGRAM(S): at 07:02

## 2022-10-21 RX ADMIN — MEMANTINE HYDROCHLORIDE 5 MILLIGRAM(S): 10 TABLET ORAL at 22:02

## 2022-10-21 RX ADMIN — Medication 650 MILLIGRAM(S): at 08:00

## 2022-10-21 RX ADMIN — PIPERACILLIN AND TAZOBACTAM 200 GRAM(S): 4; .5 INJECTION, POWDER, LYOPHILIZED, FOR SOLUTION INTRAVENOUS at 09:08

## 2022-10-21 RX ADMIN — PIPERACILLIN AND TAZOBACTAM 25 GRAM(S): 4; .5 INJECTION, POWDER, LYOPHILIZED, FOR SOLUTION INTRAVENOUS at 22:02

## 2022-10-21 RX ADMIN — Medication 81 MILLIGRAM(S): at 12:56

## 2022-10-21 RX ADMIN — Medication 250 MILLIGRAM(S): at 10:12

## 2022-10-21 RX ADMIN — MEMANTINE HYDROCHLORIDE 5 MILLIGRAM(S): 10 TABLET ORAL at 11:22

## 2022-10-21 RX ADMIN — GALANTAMINE HYDROBROMIDE 12 MILLIGRAM(S): 4 TABLET, FILM COATED ORAL at 14:23

## 2022-10-21 RX ADMIN — PANTOPRAZOLE SODIUM 40 MILLIGRAM(S): 20 TABLET, DELAYED RELEASE ORAL at 07:02

## 2022-10-21 RX ADMIN — MUPIROCIN 1 APPLICATION(S): 20 OINTMENT TOPICAL at 10:48

## 2022-10-21 RX ADMIN — Medication 1000 UNIT(S): at 11:22

## 2022-10-21 RX ADMIN — ATORVASTATIN CALCIUM 10 MILLIGRAM(S): 80 TABLET, FILM COATED ORAL at 22:02

## 2022-10-21 NOTE — PROGRESS NOTE ADULT - ASSESSMENT
cultures noted   antibiotics per ID  family requesting they take care of wound according to [facility] protocol will abide

## 2022-10-21 NOTE — PROGRESS NOTE ADULT - ASSESSMENT
INTERVAL HPI/OVERNIGHT EVENTS:    ANTIBIOTICS    MEDICATIONS  (STANDING):  aspirin enteric coated 81 milliGRAM(s) Oral daily  atorvastatin 10 milliGRAM(s) Oral at bedtime  cholecalciferol 1000 Unit(s) Oral daily  galantamine 4 milliGRAM(s) Oral at bedtime  memantine 5 milliGRAM(s) Oral two times a day  mupirocin 2% Ointment 1 Application(s) Topical two times a day  pantoprazole    Tablet 40 milliGRAM(s) Oral before breakfast  piperacillin/tazobactam IVPB.. 3.375 Gram(s) IV Intermittent every 8 hours    MEDICATIONS  (PRN):  acetaminophen    Suspension .. 650 milliGRAM(s) Oral every 6 hours PRN Temp greater or equal to 38C (100.4F)  fluticasone propionate 50 MICROgram(s)/spray Nasal Spray 1 Spray(s) Both Nostrils two times a day PRN congestion  simethicone 80 milliGRAM(s) Chew daily PRN Indigestion      Allergies    doxycycline (Unknown)  Harpreet (Unknown)    Intolerances    hydrochlorothiazide (Other)      REVIEW OF SYSTEMS:    patient uncommunicative  Vital Signs Last 24 Hrs  T(C): 36.7 (21 Oct 2022 11:51), Max: 38.4 (21 Oct 2022 06:25)  T(F): 98.1 (21 Oct 2022 11:51), Max: 101.1 (21 Oct 2022 06:25)  HR: 66 (21 Oct 2022 11:51) (66 - 76)  BP: 141/63 (21 Oct 2022 11:51) (131/69 - 165/76)  BP(mean): --  RR: 18 (21 Oct 2022 11:51) (16 - 18)  SpO2: 94% (21 Oct 2022 11:51) (93% - 95%)    Parameters below as of 21 Oct 2022 11:51  Patient On (Oxygen Delivery Method): room air        PHYSICAL EXAM:    General:  in no acute distress  Eyes: PERRL, EOM intact; conjunctiva and sclera clear  Head: Normocephalic; atraumatic  ENMT: No nasal discharge; airway clear  Neck: Supple; non tender; no masses  Respiratory: No wheezes, rales or rhonchi  Cardiovascular: Regular rate and rhythm. S1 and S2 Normal; No murmurs, gallops or rubs  Gastrointestinal: Soft non-tender non-distended; Normal bowel sounds; No hepatosplenomegaly  Genitourinary: No costovertebral angle tenderness  Extremities: Normal range of motion, No clubbing, cyanosis or edema  Lymph Nodes: No acute cervical adenopathy  Musculoskeletal: Normal gait, tone, without deformities    LABS:                        8.3    5.77  )-----------( 207      ( 21 Oct 2022 06:32 )             25.7     10-21    136  |  101  |  15  ----------------------------<  100<H>  3.8   |  27  |  0.84    Ca    8.2<L>      21 Oct 2022 06:32  Phos  2.4     10-21  Mg     1.8     10-21    TPro  6.0  /  Alb  2.6<L>  /  TBili  <0.2  /  DBili  x   /  AST  19  /  ALT  19  /  AlkPhos  142<H>  10-21      Urinalysis Basic - ( 21 Oct 2022 02:09 )    Color: Yellow / Appearance: Clear / S.010 / pH: x  Gluc: x / Ketone: NEGATIVE  / Bili: Negative / Urobili: 0.2 E.U./dL   Blood: x / Protein: Trace mg/dL / Nitrite: NEGATIVE   Leuk Esterase: Trace / RBC: > 10 /HPF / WBC > 10 /HPF   Sq Epi: x / Non Sq Epi: 0-5 /HPF / Bacteria: None /HPF          MICROBIOLOGY:  Culture Results:   No growth at 2 days. (10-19 @ 04:03)  Culture Results:   No growth at 2 days. (10-19 @ 04:03)  Culture Results:   >100,000 CFU/ml Escherichia coli Susceptibility to follow.  50,000 CFU/ml Lactobacillus rhamnosus  Culture in progress (10-19 @ 00:32)      RADIOLOGY & ADDITIONAL STUDIES:

## 2022-10-21 NOTE — PROVIDER CONTACT NOTE (CRITICAL VALUE NOTIFICATION) - ASSESSMENT
I recommend follow-up with your primary care provider given your symptoms. VSS, pt had a fever early in the morning.

## 2022-10-21 NOTE — PROGRESS NOTE ADULT - PROBLEM SELECTOR PLAN 1
Pt's family does not want the patient to receive fluids despite recommendation.  GOC discussion had. GOC discussion had - full code. Patient's sacral ulcer inspected with Dr. Toussaint - not infected, UTI more likely source. Fever overnight 101.1. Lactate 2.9.   Plan:   Started Vancomycin 750mg IVx1 (dose by level),   Zosyn 3.375 q8hr for pseudomonal dosing   - UA repeated prior   - CXR ordered   - Blood culture x 2 drawn.    - monitor VS  - appreciate ID recs (Dr. Toussaint)

## 2022-10-21 NOTE — PROGRESS NOTE ADULT - PROBLEM SELECTOR PLAN 1
Vancomycin and zosyn pending final cultures    Renal ultrasound    Case discussed with patients doctor in [place]    [phone]

## 2022-10-21 NOTE — PROGRESS NOTE ADULT - NSPROGADDITIONALINFOA_GEN_ALL_CORE
I spoke to [facility]  outside cultures  1/2 sets of blood cultures growing variable bacilli    urine culture growing  greater [id number] negative bacilli

## 2022-10-21 NOTE — PROGRESS NOTE ADULT - SUBJECTIVE AND OBJECTIVE BOX
Pt seen and examined   events noted  temp spike      REVIEW OF SYSTEMS:  unable      MEDICATIONS:  MEDICATIONS  (STANDING):  aspirin enteric coated 81 milliGRAM(s) Oral daily  atorvastatin 10 milliGRAM(s) Oral at bedtime  cholecalciferol 1000 Unit(s) Oral daily  memantine 5 milliGRAM(s) Oral two times a day  mupirocin 2% Ointment 1 Application(s) Topical two times a day  pantoprazole    Tablet 40 milliGRAM(s) Oral before breakfast  piperacillin/tazobactam IVPB.- 3.375 Gram(s) IV Intermittent once  piperacillin/tazobactam IVPB.. 3.375 Gram(s) IV Intermittent every 8 hours    MEDICATIONS  (PRN):  acetaminophen    Suspension .. 650 milliGRAM(s) Oral every 6 hours PRN Temp greater or equal to 38C (100.4F)  fluticasone propionate 50 MICROgram(s)/spray Nasal Spray 1 Spray(s) Both Nostrils two times a day PRN congestion  simethicone 80 milliGRAM(s) Chew daily PRN Indigestion      Allergies    doxycycline (Unknown)  Harpreet (Unknown)    Intolerances    hydrochlorothiazide (Other)      Vital Signs Last 24 Hrs  T(C): 36.7 (21 Oct 2022 11:51), Max: 38.4 (21 Oct 2022 06:25)  T(F): 98.1 (21 Oct 2022 11:51), Max: 101.1 (21 Oct 2022 06:25)  HR: 66 (21 Oct 2022 11:51) (66 - 76)  BP: 141/63 (21 Oct 2022 11:51) (118/74 - 165/76)  BP(mean): --  RR: 18 (21 Oct 2022 11:51) (16 - 18)  SpO2: 94% (21 Oct 2022 11:51) (93% - 96%)    Parameters below as of 21 Oct 2022 11:51  Patient On (Oxygen Delivery Method): room air        10- @ 07:01  -  10-21 @ 07:00  --------------------------------------------------------  IN: 0 mL / OUT: 815 mL / NET: -815 mL        PHYSICAL EXAM:    General: in no acute distress  HEENT: MMM, conjunctiva and sclera clear  Lungs: clear  Heart: regular  Gastrointestinal: Soft non-tender non-distended; Normal bowel sounds;  Skin: decubiti      LABS:      CBC Full  -  ( 21 Oct 2022 06:32 )  WBC Count : 5.77 K/uL  RBC Count : 2.95 M/uL  Hemoglobin : 8.3 g/dL  Hematocrit : 25.7 %  Platelet Count - Automated : 207 K/uL  Mean Cell Volume : 87.1 fl  Mean Cell Hemoglobin : 28.1 pg  Mean Cell Hemoglobin Concentration : 32.3 gm/dL  Auto Neutrophil # : 4.09 K/uL  Auto Lymphocyte # : 0.75 K/uL  Auto Monocyte # : 0.77 K/uL  Auto Eosinophil # : 0.12 K/uL  Auto Basophil # : 0.02 K/uL  Auto Neutrophil % : 71.0 %  Auto Lymphocyte % : 13.0 %  Auto Monocyte % : 13.3 %  Auto Eosinophil % : 2.1 %  Auto Basophil % : 0.3 %    10    136  |  101  |  15  ----------------------------<  100<H>  3.8   |  27  |  0.84    Ca    8.2<L>      21 Oct 2022 06:32  Phos  2.4     10-  Mg     1.8     10    TPro  6.0  /  Alb  2.6<L>  /  TBili  <0.2  /  DBili  x   /  AST  19  /  ALT  19  /  AlkPhos  142<H>  10-21          Urinalysis Basic - ( 21 Oct 2022 02:09 )    Color: Yellow / Appearance: Clear / S.010 / pH: x  Gluc: x / Ketone: NEGATIVE  / Bili: Negative / Urobili: 0.2 E.U./dL   Blood: x / Protein: Trace mg/dL / Nitrite: NEGATIVE   Leuk Esterase: Trace / RBC: > 10 /HPF / WBC > 10 /HPF   Sq Epi: x / Non Sq Epi: 0-5 /HPF / Bacteria: None /HPF                RADIOLOGY & ADDITIONAL STUDIES (The following images were personally reviewed): Last 24 hours:   Patient had fever overnight 101.1 rectal temperature w lactate 2.9.   Patient seen at bedside resting - aid at her side who reports that the patient had no cough, diarrhea, or vomiting.     REVIEW OF SYSTEMS:  otherwise unable       MEDICATIONS:  MEDICATIONS  (STANDING):  aspirin enteric coated 81 milliGRAM(s) Oral daily  atorvastatin 10 milliGRAM(s) Oral at bedtime  cholecalciferol 1000 Unit(s) Oral daily  memantine 5 milliGRAM(s) Oral two times a day  mupirocin 2% Ointment 1 Application(s) Topical two times a day  pantoprazole    Tablet 40 milliGRAM(s) Oral before breakfast  piperacillin/tazobactam IVPB.- 3.375 Gram(s) IV Intermittent once  piperacillin/tazobactam IVPB.. 3.375 Gram(s) IV Intermittent every 8 hours    MEDICATIONS  (PRN):  acetaminophen    Suspension .. 650 milliGRAM(s) Oral every 6 hours PRN Temp greater or equal to 38C (100.4F)  fluticasone propionate 50 MICROgram(s)/spray Nasal Spray 1 Spray(s) Both Nostrils two times a day PRN congestion  simethicone 80 milliGRAM(s) Chew daily PRN Indigestion      Allergies    doxycycline (Unknown)  Harpreet (Unknown)    Intolerances    hydrochlorothiazide (Other)      Vital Signs Last 24 Hrs  T(C): 36.7 (21 Oct 2022 11:51), Max: 38.4 (21 Oct 2022 06:25)  T(F): 98.1 (21 Oct 2022 11:51), Max: 101.1 (21 Oct 2022 06:25)  HR: 66 (21 Oct 2022 11:51) (66 - 76)  BP: 141/63 (21 Oct 2022 11:51) (118/74 - 165/76)  BP(mean): --  RR: 18 (21 Oct 2022 11:51) (16 - 18)  SpO2: 94% (21 Oct 2022 11:51) (93% - 96%)    Parameters below as of 21 Oct 2022 11:51  Patient On (Oxygen Delivery Method): room air        10-20 @ 07:01  -  10- @ 07:00  --------------------------------------------------------  IN: 0 mL / OUT: 815 mL / NET: -815 mL        PHYSICAL EXAM:    General: in no acute distress  HEENT: MMM, conjunctiva and sclera clear  Lungs: clear  Heart: regular  Gastrointestinal: Soft non-tender non-distended; Normal bowel sounds;  Skin: decubiti      LABS:      CBC Full  -  ( 21 Oct 2022 06:32 )  WBC Count : 5.77 K/uL  RBC Count : 2.95 M/uL  Hemoglobin : 8.3 g/dL  Hematocrit : 25.7 %  Platelet Count - Automated : 207 K/uL  Mean Cell Volume : 87.1 fl  Mean Cell Hemoglobin : 28.1 pg  Mean Cell Hemoglobin Concentration : 32.3 gm/dL  Auto Neutrophil # : 4.09 K/uL  Auto Lymphocyte # : 0.75 K/uL  Auto Monocyte # : 0.77 K/uL  Auto Eosinophil # : 0.12 K/uL  Auto Basophil # : 0.02 K/uL  Auto Neutrophil % : 71.0 %  Auto Lymphocyte % : 13.0 %  Auto Monocyte % : 13.3 %  Auto Eosinophil % : 2.1 %  Auto Basophil % : 0.3 %    10-21    136  |  101  |  15  ----------------------------<  100<H>  3.8   |  27  |  0.84    Ca    8.2<L>      21 Oct 2022 06:32  Phos  2.4     10-21  Mg     1.8     10-21    TPro  6.0  /  Alb  2.6<L>  /  TBili  <0.2  /  DBili  x   /  AST  19  /  ALT  19  /  AlkPhos  142<H>  10-21          Urinalysis Basic - ( 21 Oct 2022 02:09 )    Color: Yellow / Appearance: Clear / S.010 / pH: x  Gluc: x / Ketone: NEGATIVE  / Bili: Negative / Urobili: 0.2 E.U./dL   Blood: x / Protein: Trace mg/dL / Nitrite: NEGATIVE   Leuk Esterase: Trace / RBC: > 10 /HPF / WBC > 10 /HPF   Sq Epi: x / Non Sq Epi: 0-5 /HPF / Bacteria: None /HPF                RADIOLOGY & ADDITIONAL STUDIES (The following images were personally reviewed):

## 2022-10-22 LAB
-  AMPICILLIN/SULBACTAM: SIGNIFICANT CHANGE UP
-  AMPICILLIN: SIGNIFICANT CHANGE UP
-  CEFAZOLIN: SIGNIFICANT CHANGE UP
-  CEFTRIAXONE: SIGNIFICANT CHANGE UP
-  CIPROFLOXACIN: SIGNIFICANT CHANGE UP
-  ERTAPENEM: SIGNIFICANT CHANGE UP
-  GENTAMICIN: SIGNIFICANT CHANGE UP
-  NITROFURANTOIN: SIGNIFICANT CHANGE UP
-  PIPERACILLIN/TAZOBACTAM: SIGNIFICANT CHANGE UP
-  TOBRAMYCIN: SIGNIFICANT CHANGE UP
-  TRIMETHOPRIM/SULFAMETHOXAZOLE: SIGNIFICANT CHANGE UP
ALBUMIN SERPL ELPH-MCNC: 2.5 G/DL — LOW (ref 3.3–5)
ALP SERPL-CCNC: 131 U/L — HIGH (ref 40–120)
ALT FLD-CCNC: 16 U/L — SIGNIFICANT CHANGE UP (ref 10–45)
ANION GAP SERPL CALC-SCNC: 9 MMOL/L — SIGNIFICANT CHANGE UP (ref 5–17)
AST SERPL-CCNC: 15 U/L — SIGNIFICANT CHANGE UP (ref 10–40)
BASOPHILS # BLD AUTO: 0.07 K/UL — SIGNIFICANT CHANGE UP (ref 0–0.2)
BASOPHILS NFR BLD AUTO: 1.1 % — SIGNIFICANT CHANGE UP (ref 0–2)
BILIRUB SERPL-MCNC: 0.2 MG/DL — SIGNIFICANT CHANGE UP (ref 0.2–1.2)
BUN SERPL-MCNC: 13 MG/DL — SIGNIFICANT CHANGE UP (ref 7–23)
CALCIUM SERPL-MCNC: 8.5 MG/DL — SIGNIFICANT CHANGE UP (ref 8.4–10.5)
CHLORIDE SERPL-SCNC: 102 MMOL/L — SIGNIFICANT CHANGE UP (ref 96–108)
CO2 SERPL-SCNC: 29 MMOL/L — SIGNIFICANT CHANGE UP (ref 22–31)
CREAT SERPL-MCNC: 0.88 MG/DL — SIGNIFICANT CHANGE UP (ref 0.5–1.3)
CULTURE RESULTS: NO GROWTH — SIGNIFICANT CHANGE UP
CULTURE RESULTS: SIGNIFICANT CHANGE UP
D DIMER BLD IA.RAPID-MCNC: 782 NG/ML DDU — HIGH
EGFR: 60 ML/MIN/1.73M2 — SIGNIFICANT CHANGE UP
EOSINOPHIL # BLD AUTO: 0.23 K/UL — SIGNIFICANT CHANGE UP (ref 0–0.5)
EOSINOPHIL NFR BLD AUTO: 3.7 % — SIGNIFICANT CHANGE UP (ref 0–6)
GLUCOSE SERPL-MCNC: 94 MG/DL — SIGNIFICANT CHANGE UP (ref 70–99)
HCT VFR BLD CALC: 27 % — LOW (ref 34.5–45)
HGB BLD-MCNC: 8.8 G/DL — LOW (ref 11.5–15.5)
IMM GRANULOCYTES NFR BLD AUTO: 0.6 % — SIGNIFICANT CHANGE UP (ref 0–0.9)
LACTATE SERPL-SCNC: 1.2 MMOL/L — SIGNIFICANT CHANGE UP (ref 0.5–2)
LYMPHOCYTES # BLD AUTO: 1.12 K/UL — SIGNIFICANT CHANGE UP (ref 1–3.3)
LYMPHOCYTES # BLD AUTO: 18 % — SIGNIFICANT CHANGE UP (ref 13–44)
MAGNESIUM SERPL-MCNC: 1.9 MG/DL — SIGNIFICANT CHANGE UP (ref 1.6–2.6)
MCHC RBC-ENTMCNC: 28.4 PG — SIGNIFICANT CHANGE UP (ref 27–34)
MCHC RBC-ENTMCNC: 32.6 GM/DL — SIGNIFICANT CHANGE UP (ref 32–36)
MCV RBC AUTO: 87.1 FL — SIGNIFICANT CHANGE UP (ref 80–100)
METHOD TYPE: SIGNIFICANT CHANGE UP
METHOD TYPE: SIGNIFICANT CHANGE UP
MONOCYTES # BLD AUTO: 0.82 K/UL — SIGNIFICANT CHANGE UP (ref 0–0.9)
MONOCYTES NFR BLD AUTO: 13.2 % — SIGNIFICANT CHANGE UP (ref 2–14)
NEUTROPHILS # BLD AUTO: 3.94 K/UL — SIGNIFICANT CHANGE UP (ref 1.8–7.4)
NEUTROPHILS NFR BLD AUTO: 63.4 % — SIGNIFICANT CHANGE UP (ref 43–77)
NRBC # BLD: 0 /100 WBCS — SIGNIFICANT CHANGE UP (ref 0–0)
ORGANISM # SPEC MICROSCOPIC CNT: SIGNIFICANT CHANGE UP
PHOSPHATE SERPL-MCNC: 2.8 MG/DL — SIGNIFICANT CHANGE UP (ref 2.5–4.5)
PLATELET # BLD AUTO: 235 K/UL — SIGNIFICANT CHANGE UP (ref 150–400)
POTASSIUM SERPL-MCNC: 3.9 MMOL/L — SIGNIFICANT CHANGE UP (ref 3.5–5.3)
POTASSIUM SERPL-SCNC: 3.9 MMOL/L — SIGNIFICANT CHANGE UP (ref 3.5–5.3)
PROT SERPL-MCNC: 5.8 G/DL — LOW (ref 6–8.3)
RBC # BLD: 3.1 M/UL — LOW (ref 3.8–5.2)
RBC # FLD: 16.6 % — HIGH (ref 10.3–14.5)
SODIUM SERPL-SCNC: 140 MMOL/L — SIGNIFICANT CHANGE UP (ref 135–145)
SPECIMEN SOURCE: SIGNIFICANT CHANGE UP
SPECIMEN SOURCE: SIGNIFICANT CHANGE UP
VANCOMYCIN FLD-MCNC: 6.2 UG/ML — SIGNIFICANT CHANGE UP
WBC # BLD: 6.22 K/UL — SIGNIFICANT CHANGE UP (ref 3.8–10.5)
WBC # FLD AUTO: 6.22 K/UL — SIGNIFICANT CHANGE UP (ref 3.8–10.5)

## 2022-10-22 PROCEDURE — 76770 US EXAM ABDO BACK WALL COMP: CPT | Mod: 26

## 2022-10-22 RX ADMIN — MUPIROCIN 1 APPLICATION(S): 20 OINTMENT TOPICAL at 19:42

## 2022-10-22 RX ADMIN — GALANTAMINE HYDROBROMIDE 12 MILLIGRAM(S): 4 TABLET, FILM COATED ORAL at 13:38

## 2022-10-22 RX ADMIN — Medication 81 MILLIGRAM(S): at 13:36

## 2022-10-22 RX ADMIN — MEMANTINE HYDROCHLORIDE 5 MILLIGRAM(S): 10 TABLET ORAL at 06:44

## 2022-10-22 RX ADMIN — PIPERACILLIN AND TAZOBACTAM 25 GRAM(S): 4; .5 INJECTION, POWDER, LYOPHILIZED, FOR SOLUTION INTRAVENOUS at 13:54

## 2022-10-22 RX ADMIN — PIPERACILLIN AND TAZOBACTAM 25 GRAM(S): 4; .5 INJECTION, POWDER, LYOPHILIZED, FOR SOLUTION INTRAVENOUS at 21:50

## 2022-10-22 RX ADMIN — MEMANTINE HYDROCHLORIDE 5 MILLIGRAM(S): 10 TABLET ORAL at 21:50

## 2022-10-22 RX ADMIN — MUPIROCIN 1 APPLICATION(S): 20 OINTMENT TOPICAL at 11:48

## 2022-10-22 RX ADMIN — PANTOPRAZOLE SODIUM 40 MILLIGRAM(S): 20 TABLET, DELAYED RELEASE ORAL at 06:44

## 2022-10-22 RX ADMIN — PIPERACILLIN AND TAZOBACTAM 25 GRAM(S): 4; .5 INJECTION, POWDER, LYOPHILIZED, FOR SOLUTION INTRAVENOUS at 06:44

## 2022-10-22 RX ADMIN — Medication 1000 UNIT(S): at 13:37

## 2022-10-22 RX ADMIN — ATORVASTATIN CALCIUM 10 MILLIGRAM(S): 80 TABLET, FILM COATED ORAL at 21:50

## 2022-10-22 RX ADMIN — GALANTAMINE HYDROBROMIDE 4 MILLIGRAM(S): 4 TABLET, FILM COATED ORAL at 21:50

## 2022-10-22 NOTE — PROGRESS NOTE ADULT - SUBJECTIVE AND OBJECTIVE BOX
Pt seen and examined   seems better  no fever overnight  cultures = e coli    REVIEW OF SYSTEMS:  unable      MEDICATIONS:  MEDICATIONS  (STANDING):  aspirin enteric coated 81 milliGRAM(s) Oral daily  atorvastatin 10 milliGRAM(s) Oral at bedtime  cholecalciferol 1000 Unit(s) Oral daily  galantamine 12 milliGRAM(s) Oral daily  galantamine 4 milliGRAM(s) Oral at bedtime  memantine 5 milliGRAM(s) Oral two times a day  mupirocin 2% Ointment 1 Application(s) Topical two times a day  pantoprazole    Tablet 40 milliGRAM(s) Oral before breakfast  piperacillin/tazobactam IVPB.. 3.375 Gram(s) IV Intermittent every 8 hours    MEDICATIONS  (PRN):  acetaminophen    Suspension .. 650 milliGRAM(s) Oral every 6 hours PRN Temp greater or equal to 38C (100.4F)  artificial  tears Solution 1 Drop(s) Both EYES daily PRN Dry Eyes  fluticasone propionate 50 MICROgram(s)/spray Nasal Spray 1 Spray(s) Both Nostrils two times a day PRN congestion  simethicone 80 milliGRAM(s) Chew daily PRN Indigestion      Allergies    doxycycline (Unknown)  [place]resa (Unknown)    Intolerances    hydrochlorothiazide (Other)      Vital Signs Last 24 Hrs  T(C): 36.4 (22 Oct 2022 06:53), Max: 37.4 (21 Oct 2022 14:00)  T(F): 97.6 (22 Oct 2022 06:53), Max: 99.4 (21 Oct 2022 14:00)  HR: 66 (22 Oct 2022 06:53) (66 - 70)  BP: 160/88 (22 Oct 2022 06:53) (135/76 - 160/88)  BP(mean): --  RR: 20 (22 Oct 2022 06:53) (20 - 20)  SpO2: 96% (22 Oct 2022 06:53) (96% - 96%)    Parameters below as of 22 Oct 2022 06:53  Patient On (Oxygen Delivery Method): room air        10-21 @ 07:01  -  10-22 @ 07:00  --------------------------------------------------------  IN: 0 mL / OUT: 2500 mL / NET: -2500 mL        PHYSICAL EXAM:    General:  in no acute distress  HEENT: MMM, conjunctiva and sclera clear  Lungs: clear  Heart: regular  Gastrointestinal: Soft non-tender non-distended; Normal bowel sounds;   Skin: deep sacral decubiti    LABS:      CBC Full  -  ( 22 Oct 2022 05:30 )  WBC Count : 6.22 K/uL  RBC Count : 3.10 M/uL  Hemoglobin : 8.8 g/dL  Hematocrit : 27.0 %  Platelet Count - Automated : 235 K/uL  Mean Cell Volume : 87.1 fl  Mean Cell Hemoglobin : 28.4 pg  Mean Cell Hemoglobin Concentration : 32.6 gm/dL  Auto Neutrophil # : 3.94 K/uL  Auto Lymphocyte # : 1.12 K/uL  Auto Monocyte # : 0.82 K/uL  Auto Eosinophil # : 0.23 K/uL  Auto Basophil # : 0.07 K/uL  Auto Neutrophil % : 63.4 %  Auto Lymphocyte % : 18.0 %  Auto Monocyte % : 13.2 %  Auto Eosinophil % : 3.7 %  Auto Basophil % : 1.1 %    10    140  |  102  |  13  ----------------------------<  94  3.9   |  29  |  0.88    Ca    8.5      22 Oct 2022 05:30  Phos  2.8     10-22  Mg     1.9     10-22    TPro  5.8<L>  /  Alb  2.5<L>  /  TBili  0.2  /  DBili  x   /  AST  15  /  ALT  16  /  AlkPhos  131<H>  10-22          Urinalysis Basic - ( 21 Oct 2022 02:09 )    Color: Yellow / Appearance: Clear / S.010 / pH: x  Gluc: x / Ketone: NEGATIVE  / Bili: Negative / Urobili: 0.2 E.U./dL   Blood: x / Protein: Trace mg/dL / Nitrite: NEGATIVE   Leuk Esterase: Trace / RBC: > 10 /HPF / WBC > 10 /HPF   Sq Epi: x / Non Sq Epi: 0-5 /HPF / Bacteria: None /HPF                RADIOLOGY & ADDITIONAL STUDIES (The following images were personally reviewed): Pt seen and examined   seems better  no fever overnight  cultures = e coli    REVIEW OF SYSTEMS:  unable      MEDICATIONS:  MEDICATIONS  (STANDING):  aspirin enteric coated 81 milliGRAM(s) Oral daily  atorvastatin 10 milliGRAM(s) Oral at bedtime  cholecalciferol 1000 Unit(s) Oral daily  galantamine 12 milliGRAM(s) Oral daily  galantamine 4 milliGRAM(s) Oral at bedtime  memantine 5 milliGRAM(s) Oral two times a day  mupirocin 2% Ointment 1 Application(s) Topical two times a day  pantoprazole    Tablet 40 milliGRAM(s) Oral before breakfast  piperacillin/tazobactam IVPB.. 3.375 Gram(s) IV Intermittent every 8 hours    MEDICATIONS  (PRN):  acetaminophen    Suspension .. 650 milliGRAM(s) Oral every 6 hours PRN Temp greater or equal to 38C (100.4F)  artificial  tears Solution 1 Drop(s) Both EYES daily PRN Dry Eyes  fluticasone propionate 50 MICROgram(s)/spray Nasal Spray 1 Spray(s) Both Nostrils two times a day PRN congestion  simethicone 80 milliGRAM(s) Chew daily PRN Indigestion      Allergies    doxycycline (Unknown)  [place] (Unknown)    Intolerances    hydrochlorothiazide (Other)      Vital Signs Last 24 Hrs  T(C): 36.4 (22 Oct 2022 06:53), Max: 37.4 (21 Oct 2022 14:00)  T(F): 97.6 (22 Oct 2022 06:53), Max: 99.4 (21 Oct 2022 14:00)  HR: 66 (22 Oct 2022 06:53) (66 - 70)  BP: 160/88 (22 Oct 2022 06:53) (135/76 - 160/88)  BP(mean): --  RR: 20 (22 Oct 2022 06:53) (20 - 20)  SpO2: 96% (22 Oct 2022 06:53) (96% - 96%)    Parameters below as of 22 Oct 2022 06:53  Patient On (Oxygen Delivery Method): room air        10-21 @ 07:01  -  10-22 @ 07:00  --------------------------------------------------------  IN: 0 mL / OUT: 2500 mL / NET: -2500 mL        PHYSICAL EXAM:    General:  in no acute distress  HEENT: MMM, conjunctiva and sclera clear  Lungs: clear  Heart: regular + murmur  Gastrointestinal: Soft non-tender non-distended; Normal bowel sounds;   Skin: deep sacral decubiti    LABS:      CBC Full  -  ( 22 Oct 2022 05:30 )  WBC Count : 6.22 K/uL  RBC Count : 3.10 M/uL  Hemoglobin : 8.8 g/dL  Hematocrit : 27.0 %  Platelet Count - Automated : 235 K/uL  Mean Cell Volume : 87.1 fl  Mean Cell Hemoglobin : 28.4 pg  Mean Cell Hemoglobin Concentration : 32.6 gm/dL  Auto Neutrophil # : 3.94 K/uL  Auto Lymphocyte # : 1.12 K/uL  Auto Monocyte # : 0.82 K/uL  Auto Eosinophil # : 0.23 K/uL  Auto Basophil # : 0.07 K/uL  Auto Neutrophil % : 63.4 %  Auto Lymphocyte % : 18.0 %  Auto Monocyte % : 13.2 %  Auto Eosinophil % : 3.7 %  Auto Basophil % : 1.1 %    10    140  |  102  |  13  ----------------------------<  94  3.9   |  29  |  0.88    Ca    8.5      22 Oct 2022 05:30  Phos  2.8     10-22  Mg     1.9     10-22    TPro  5.8<L>  /  Alb  2.5<L>  /  TBili  0.2  /  DBili  x   /  AST  15  /  ALT  16  /  AlkPhos  131<H>  10-22          Urinalysis Basic - ( 21 Oct 2022 02:09 )    Color: Yellow / Appearance: Clear / S.010 / pH: x  Gluc: x / Ketone: NEGATIVE  / Bili: Negative / Urobili: 0.2 E.U./dL   Blood: x / Protein: Trace mg/dL / Nitrite: NEGATIVE   Leuk Esterase: Trace / RBC: > 10 /HPF / WBC > 10 /HPF   Sq Epi: x / Non Sq Epi: 0-5 /HPF / Bacteria: None /HPF                RADIOLOGY & ADDITIONAL STUDIES (The following images were personally reviewed):

## 2022-10-22 NOTE — PROGRESS NOTE ADULT - ASSESSMENT
wound care per family  antimicrobial per Dr Toussaint  urine cultures noted  await final on blood cultures

## 2022-10-22 NOTE — CHART NOTE - NSCHARTNOTEFT_GEN_A_CORE
Patient found to be retaining urine throughout hospital visit. Per family, patient is not urinating as she is used to urinating on a commode/ toilet and not in the bed. Patient noted to be weak during this admission and evaluated by physical therapy on 10/20 and noted to be a 2- person assist with decreased ability of use of arms/legs and impaired ability to control trunk. Resident notified by nursing staff that patient unable to be straight-cathed for bladder scan >300 by multiple staff. Patient's family resisting becerra administration per instruction from private PCP despite knowing risks of urinary retention. Resident notified Urology for assistance, urology at bedside. Patient's family insisting on avoiding straight-cath at this time and wanting urgent PT evaluation for bedside commode placement at once. Urology PA was able to access PT team to help with assessment, however per PT team, patient with poor truncal mobility and unable to sit up without support and assistance. Patient was placed on bedside commode with assistance from PT team and patient held up by nursing staff. Despite adequate time on commode, when attempting to return patient to bed, family insisting on keeping patient on commode for a longer time. Patient's family at bedside and explained that patient is currently not to be out of bed without supervision and/or further PT evaluation to avoid injury and/or fall. Family insistent that staff can leave and/or bring in other staff to keep patient on commode for longer time. Resident at bedside and nursing supervisors notified and at bedside. Per medical staff decision, patient was to be placed back in bed to prevent injury. Patient's family was unable to assess the situation and was attempting to interfere with medical care and decision-making. Patient was safely placed back in bed by nursing and re-positioned for comfort. Internal medicine chief on call notified and aware. Nursing management aware. Attending aware.

## 2022-10-22 NOTE — PROGRESS NOTE ADULT - SUBJECTIVE AND OBJECTIVE BOX
INTERVAL HPI/OVERNIGHT EVENTS:    ANTIBIOTICS    MEDICATIONS  (STANDING):  aspirin enteric coated 81 milliGRAM(s) Oral daily  atorvastatin 10 milliGRAM(s) Oral at bedtime  cholecalciferol 1000 Unit(s) Oral daily  galantamine 12 milliGRAM(s) Oral daily  galantamine 4 milliGRAM(s) Oral at bedtime  memantine 5 milliGRAM(s) Oral two times a day  mupirocin 2% Ointment 1 Application(s) Topical two times a day  pantoprazole    Tablet 40 milliGRAM(s) Oral before breakfast  piperacillin/tazobactam IVPB.. 3.375 Gram(s) IV Intermittent every 8 hours    MEDICATIONS  (PRN):  acetaminophen    Suspension .. 650 milliGRAM(s) Oral every 6 hours PRN Temp greater or equal to 38C (100.4F)  artificial  tears Solution 1 Drop(s) Both EYES daily PRN Dry Eyes  fluticasone propionate 50 MICROgram(s)/spray Nasal Spray 1 Spray(s) Both Nostrils two times a day PRN congestion  simethicone 80 milliGRAM(s) Chew daily PRN Indigestion      Allergies    doxycycline (Unknown)  [place] (Unknown)    Intolerances    hydrochlorothiazide (Other)      REVIEW OF SYSTEMS:      Vital Signs Last 24 Hrs  T(C): 36.4 (22 Oct 2022 06:53), Max: 37.4 (21 Oct 2022 14:00)  T(F): 97.6 (22 Oct 2022 06:53), Max: 99.4 (21 Oct 2022 14:00)  HR: 66 (22 Oct 2022 06:53) (66 - 70)  BP: 160/88 (22 Oct 2022 06:53) (135/76 - 160/88)  BP(mean): --  RR: 20 (22 Oct 2022 06:53) (18 - 20)  SpO2: 96% (22 Oct 2022 06:53) (94% - 96%)    Parameters below as of 22 Oct 2022 06:53  Patient On (Oxygen Delivery Method): room air        PHYSICAL EXAM:    General:  in no acute distress  Eyes: PERRL, EOM intact; conjunctiva and sclera clear  Head: Normocephalic; atraumatic  ENMT: No nasal discharge; airway clear  Neck: Supple; non tender; no masses  Respiratory: No wheezes, rales or rhonchi  Cardiovascular: Regular rate and rhythm. S1 and S2 Normal; No murmurs, gallops or rubs  Gastrointestinal: Soft non-tender non-distended; Normal bowel sounds; No hepatosplenomegaly  Genitourinary: No costovertebral angle tenderness  Extremities: Normal range of motion, No clubbing, cyanosis or edema   sacral decubitus  slight serous drainage  no pus   no erythema  no odor  LABS:                        8.8    6.22  )-----------( 235      ( 22 Oct 2022 05:30 )             27.0     10-22    140  |  102  |  13  ----------------------------<  94  3.9   |  29  |  0.88    Ca    8.5      22 Oct 2022 05:30  Phos  2.8     10-22  Mg     1.9     10-22    TPro  5.8<L>  /  Alb  2.5<L>  /  TBili  0.2  /  DBili  x   /  AST  15  /  ALT  16  /  AlkPhos  131<H>  10-22      Urinalysis Basic - ( 21 Oct 2022 02:09 )    Color: Yellow / Appearance: Clear / S.010 / pH: x  Gluc: x / Ketone: NEGATIVE  / Bili: Negative / Urobili: 0.2 E.U./dL   Blood: x / Protein: Trace mg/dL / Nitrite: NEGATIVE   Leuk Esterase: Trace / RBC: > 10 /HPF / WBC > 10 /HPF   Sq Epi: x / Non Sq Epi: 0-5 /HPF / Bacteria: None /HPF          MICROBIOLOGY:  Culture Results:   No growth at 1 day. (10-21 @ 07:25)  Culture Results:   No growth at 1 day. (10-21 @ 07:23)  Culture Results:   No growth at 3 days. (10-19 @ 04:03)  Culture Results:   No growth at 3 days. (10-19 @ 04:03)  Culture Results:   >100,000 CFU/ml Escherichia coli  Normal Urogenital ashok present (10-19 @ 00:32)      RADIOLOGY & ADDITIONAL STUDIES:

## 2022-10-22 NOTE — PROVIDER CONTACT NOTE (OTHER) - SITUATION
Bladder kpxq=231, no urine output throughout shift. 3 RN at bedside attempting straight cath without success. Family refusing further attempts. Dr. Morrison made aware and at bedside

## 2022-10-23 LAB
ANION GAP SERPL CALC-SCNC: 8 MMOL/L — SIGNIFICANT CHANGE UP (ref 5–17)
BUN SERPL-MCNC: 17 MG/DL — SIGNIFICANT CHANGE UP (ref 7–23)
CALCIUM SERPL-MCNC: 8.3 MG/DL — LOW (ref 8.4–10.5)
CHLORIDE SERPL-SCNC: 104 MMOL/L — SIGNIFICANT CHANGE UP (ref 96–108)
CO2 SERPL-SCNC: 28 MMOL/L — SIGNIFICANT CHANGE UP (ref 22–31)
CREAT SERPL-MCNC: 1.01 MG/DL — SIGNIFICANT CHANGE UP (ref 0.5–1.3)
EGFR: 51 ML/MIN/1.73M2 — LOW
GLUCOSE SERPL-MCNC: 106 MG/DL — HIGH (ref 70–99)
HCT VFR BLD CALC: 27.6 % — LOW (ref 34.5–45)
HGB BLD-MCNC: 8.9 G/DL — LOW (ref 11.5–15.5)
MAGNESIUM SERPL-MCNC: 1.9 MG/DL — SIGNIFICANT CHANGE UP (ref 1.6–2.6)
MCHC RBC-ENTMCNC: 28.1 PG — SIGNIFICANT CHANGE UP (ref 27–34)
MCHC RBC-ENTMCNC: 32.2 GM/DL — SIGNIFICANT CHANGE UP (ref 32–36)
MCV RBC AUTO: 87.1 FL — SIGNIFICANT CHANGE UP (ref 80–100)
NRBC # BLD: 0 /100 WBCS — SIGNIFICANT CHANGE UP (ref 0–0)
PHOSPHATE SERPL-MCNC: 3.3 MG/DL — SIGNIFICANT CHANGE UP (ref 2.5–4.5)
PLATELET # BLD AUTO: 257 K/UL — SIGNIFICANT CHANGE UP (ref 150–400)
POTASSIUM SERPL-MCNC: 4.2 MMOL/L — SIGNIFICANT CHANGE UP (ref 3.5–5.3)
POTASSIUM SERPL-SCNC: 4.2 MMOL/L — SIGNIFICANT CHANGE UP (ref 3.5–5.3)
RBC # BLD: 3.17 M/UL — LOW (ref 3.8–5.2)
RBC # FLD: 16.4 % — HIGH (ref 10.3–14.5)
SODIUM SERPL-SCNC: 140 MMOL/L — SIGNIFICANT CHANGE UP (ref 135–145)
WBC # BLD: 6.61 K/UL — SIGNIFICANT CHANGE UP (ref 3.8–10.5)
WBC # FLD AUTO: 6.61 K/UL — SIGNIFICANT CHANGE UP (ref 3.8–10.5)

## 2022-10-23 RX ORDER — CEFTRIAXONE 500 MG/1
1000 INJECTION, POWDER, FOR SOLUTION INTRAMUSCULAR; INTRAVENOUS EVERY 24 HOURS
Refills: 0 | Status: DISCONTINUED | OUTPATIENT
Start: 2022-10-23 | End: 2022-10-24

## 2022-10-23 RX ADMIN — Medication 81 MILLIGRAM(S): at 11:08

## 2022-10-23 RX ADMIN — GALANTAMINE HYDROBROMIDE 12 MILLIGRAM(S): 4 TABLET, FILM COATED ORAL at 11:09

## 2022-10-23 RX ADMIN — MEMANTINE HYDROCHLORIDE 5 MILLIGRAM(S): 10 TABLET ORAL at 18:28

## 2022-10-23 RX ADMIN — MUPIROCIN 1 APPLICATION(S): 20 OINTMENT TOPICAL at 18:47

## 2022-10-23 RX ADMIN — GALANTAMINE HYDROBROMIDE 4 MILLIGRAM(S): 4 TABLET, FILM COATED ORAL at 22:56

## 2022-10-23 RX ADMIN — PIPERACILLIN AND TAZOBACTAM 25 GRAM(S): 4; .5 INJECTION, POWDER, LYOPHILIZED, FOR SOLUTION INTRAVENOUS at 06:16

## 2022-10-23 RX ADMIN — MUPIROCIN 1 APPLICATION(S): 20 OINTMENT TOPICAL at 06:00

## 2022-10-23 RX ADMIN — Medication 1000 UNIT(S): at 11:08

## 2022-10-23 RX ADMIN — ATORVASTATIN CALCIUM 10 MILLIGRAM(S): 80 TABLET, FILM COATED ORAL at 22:56

## 2022-10-23 RX ADMIN — MEMANTINE HYDROCHLORIDE 5 MILLIGRAM(S): 10 TABLET ORAL at 11:08

## 2022-10-23 RX ADMIN — CEFTRIAXONE 100 MILLIGRAM(S): 500 INJECTION, POWDER, FOR SOLUTION INTRAMUSCULAR; INTRAVENOUS at 11:08

## 2022-10-23 NOTE — PROGRESS NOTE ADULT - PROBLEM SELECTOR PLAN 6
#PT  - PT eval    F: NI  E: replete as needed  N: regular diet  DVT ppx: SCDs  GI ppx: NI F: NI  E: replete as needed  N: regular diet  DVT ppx: SCDs  GI ppx: NI

## 2022-10-23 NOTE — PROGRESS NOTE ADULT - SUBJECTIVE AND OBJECTIVE BOX
Pt seen and examined   no new changes  alert  eating when fed    REVIEW OF SYSTEMS:  unable    MEDICATIONS:  MEDICATIONS  (STANDING):  aspirin enteric coated 81 milliGRAM(s) Oral daily  atorvastatin 10 milliGRAM(s) Oral at bedtime  cefTRIAXone   IVPB 1000 milliGRAM(s) IV Intermittent every 24 hours  cholecalciferol 1000 Unit(s) Oral daily  galantamine 12 milliGRAM(s) Oral daily  galantamine 4 milliGRAM(s) Oral at bedtime  memantine 5 milliGRAM(s) Oral two times a day  mupirocin 2% Ointment 1 Application(s) Topical two times a day  pantoprazole    Tablet 40 milliGRAM(s) Oral before breakfast    MEDICATIONS  (PRN):  acetaminophen    Suspension .. 650 milliGRAM(s) Oral every 6 hours PRN Temp greater or equal to 38C (100.4F)  artificial  tears Solution 1 Drop(s) Both EYES daily PRN Dry Eyes  fluticasone propionate 50 MICROgram(s)/spray Nasal Spray 1 Spray(s) Both Nostrils two times a day PRN congestion  simethicone 80 milliGRAM(s) Chew daily PRN Indigestion      Allergies    doxycycline (Unknown)  [place] (Unknown)    Intolerances    hydrochlorothiazide (Other)      Vital Signs Last 24 Hrs  T(C): 37.2 (23 Oct 2022 05:28), Max: 37.2 (23 Oct 2022 05:28)  T(F): 99 (23 Oct 2022 05:28), Max: 99 (23 Oct 2022 05:28)  HR: 65 (23 Oct 2022 05:28) (64 - 73)  BP: 125/67 (23 Oct 2022 05:28) (118/64 - 142/92)  BP(mean): --  RR: 18 (23 Oct 2022 05:28) (17 - 18)  SpO2: 94% (23 Oct 2022 05:28) (93% - 97%)    Parameters below as of 23 Oct 2022 05:28  Patient On (Oxygen Delivery Method): room air        10-22 @ 07:01  -  10-23 @ 07:00  --------------------------------------------------------  IN: 0 mL / OUT: 600 mL / NET: -600 mL        PHYSICAL EXAM:    General: ; in no acute distress  HEENT: MMM, conjunctiva and sclera clear  Lungs: clear  heart: regular  Gastrointestinal: Soft non-tender non-distended; Normal bowel sounds;   Skin: pressure sore    LABS:      CBC Full  -  ( 23 Oct 2022 07:46 )  WBC Count : 6.61 K/uL  RBC Count : 3.17 M/uL  Hemoglobin : 8.9 g/dL  Hematocrit : 27.6 %  Platelet Count - Automated : 257 K/uL  Mean Cell Volume : 87.1 fl  Mean Cell Hemoglobin : 28.1 pg  Mean Cell Hemoglobin Concentration : 32.2 gm/dL  Auto Neutrophil # : x  Auto Lymphocyte # : x  Auto Monocyte # : x  Auto Eosinophil # : x  Auto Basophil # : x  Auto Neutrophil % : x  Auto Lymphocyte % : x  Auto Monocyte % : x  Auto Eosinophil % : x  Auto Basophil % : x    10-23    140  |  104  |  17  ----------------------------<  106<H>  4.2   |  28  |  1.01    Ca    8.3<L>      23 Oct 2022 07:46  Phos  3.3     10-23  Mg     1.9     10-23    TPro  5.8<L>  /  Alb  2.5<L>  /  TBili  0.2  /  DBili  x   /  AST  15  /  ALT  16  /  AlkPhos  131<H>  10-22                      RADIOLOGY & ADDITIONAL STUDIES (The following images were personally reviewed):

## 2022-10-23 NOTE — PROGRESS NOTE ADULT - PROBLEM SELECTOR PLAN 3
Hx of paroxysmal afib  Not on anticoagulation, CHADSVASc 7 (HTN, hx of cerebral artery occ, ?CHF history, female)  Plan:  -cont to monitor Hx of paroxysmal afib   Not on anticoagulation, CHADSVASc 7 (HTN, hx of cerebral artery occ, ?CHF history, female)  Plan:  -cont to monitor

## 2022-10-23 NOTE — PROGRESS NOTE ADULT - PROBLEM SELECTOR PLAN 1
Presenting with 100.1F, from [facility] reported to have UTI s/p CTX and 750ccs IVF  UA in ED+ cloudy w/ LE, WBC   Plan:  - f/u urine cultures  - c/w CTX x3d  - monitor VSS  - has primafit, bladder scans as necessary Presenting with 100.1F, from [facility] reported to have UTI s/p CTX and 750ccs IVF. UA in ED+ cloudy w/ LE, WBC. Per patient's outpatient provider, bacterial cultures grew Ecoli. Bladder US showed: Atrophic kidneys. No hydronephrosis identified. Increased echogenicity of the right kidney suggestive of medical renal disease.     Plan:  - will give CTX IV 1 g x 10 days, patient will receive at least one dose inpatient - will set-up for placement of PICC line prior to d/c.   - Patient continues to retain on BS (300-400s), however patient's family refuses placement of becerra despite understanding risks of continuous straight cath. Urology consulted for difficult placement, however cannot continue introducing damage w frequent straight cath - has primafit, will continue with bladder scans as necessary  - f/u urine cultures

## 2022-10-23 NOTE — PROGRESS NOTE ADULT - SUBJECTIVE AND OBJECTIVE BOX
**incomplete** T(C): 37.2 (10-23-22 @ 05:28), Max: 37.2 (10-23-22 @ 05:28)  HR: 65 (10-23-22 @ 05:28) (64 - 73)  BP: 125/67 (10-23-22 @ 05:28) (118/64 - 142/92)  RR: 18 (10-23-22 @ 05:28) (17 - 18)  SpO2: 94% (10-23-22 @ 05:28) (93% - 97%)  Wt(kg): --Vital Signs Last 24 Hrs  T(C): 37.2 (23 Oct 2022 05:28), Max: 37.2 (23 Oct 2022 05:28)  T(F): 99 (23 Oct 2022 05:28), Max: 99 (23 Oct 2022 05:28)  HR: 65 (23 Oct 2022 05:28) (64 - 73)  BP: 125/67 (23 Oct 2022 05:28) (118/64 - 142/92)  BP(mean): --  RR: 18 (23 Oct 2022 05:28) (17 - 18)  SpO2: 94% (23 Oct 2022 05:28) (93% - 97%)    Parameters below as of 23 Oct 2022 05:28  Patient On (Oxygen Delivery Method): room air        PHYSICAL EXAM:  NA    Consultant(s) Notes Reviewed:  [x ] YES  [ ] NO  Care Discussed with Consultants/Other Providers [ x] YES  [ ] NO    LABS:                        8.9    6.61  )-----------( 257      ( 23 Oct 2022 07:46 )             27.6     10-23    140  |  104  |  17  ----------------------------<  106<H>  4.2   |  28  |  1.01    Ca    8.3<L>      23 Oct 2022 07:46  Phos  3.3     10-23  Mg     1.9     10-23    TPro  5.8<L>  /  Alb  2.5<L>  /  TBili  0.2  /  DBili  x   /  AST  15  /  ALT  16  /  AlkPhos  131<H>  10-22          CAPILLARY BLOOD GLUCOSE                RADIOLOGY & ADDITIONAL TESTS:    Imaging Personally Reviewed:  [ ] YES  [ ] NO

## 2022-10-23 NOTE — PROGRESS NOTE ADULT - PROBLEM SELECTOR PLAN 2
2/2 to UTI - IMPROVING  CTH shows progressive volume loss, negative for acute infarct, ICH  Plan:  - continue to monitor  - f/u b12, folate, TSH 2/2 to UTI - IMPROVING. CTH shows progressive volume loss, negative for acute infarct, neg ICH. B12 WNL, folate WNL, TSH WNL  Plan:  - continue to monitor

## 2022-10-23 NOTE — PROGRESS NOTE ADULT - ASSESSMENT
blood cultures from [facility] = e. coli  appreciate Dr Toussaint  recommend: 14 days total of antibiotics  needs 10 more days of IC ceftriaxone q day  family wants to do it at home  will need PICC line  will need home care  to get home infusion

## 2022-10-23 NOTE — PROGRESS NOTE ADULT - PROBLEM SELECTOR PLAN 5
Per family, hx of sacral decub 9/2022 after hospitalization for urosepsis  Plan:  - Wound care eval Per family, hx of sacral decub 9/2022 after hospitalization for urosepsis  Plan:  - Wound care eval  - PT following, f/u recs

## 2022-10-24 ENCOUNTER — TRANSCRIPTION ENCOUNTER (OUTPATIENT)
Age: 87
End: 2022-10-24

## 2022-10-24 VITALS — HEART RATE: 66 BPM | SYSTOLIC BLOOD PRESSURE: 128 MMHG | DIASTOLIC BLOOD PRESSURE: 75 MMHG

## 2022-10-24 LAB
ALBUMIN SERPL ELPH-MCNC: 2.7 G/DL — LOW (ref 3.3–5)
ALP SERPL-CCNC: 129 U/L — HIGH (ref 40–120)
ALT FLD-CCNC: 13 U/L — SIGNIFICANT CHANGE UP (ref 10–45)
ANION GAP SERPL CALC-SCNC: 10 MMOL/L — SIGNIFICANT CHANGE UP (ref 5–17)
AST SERPL-CCNC: 15 U/L — SIGNIFICANT CHANGE UP (ref 10–40)
BASOPHILS # BLD AUTO: 0.04 K/UL — SIGNIFICANT CHANGE UP (ref 0–0.2)
BASOPHILS NFR BLD AUTO: 0.6 % — SIGNIFICANT CHANGE UP (ref 0–2)
BILIRUB SERPL-MCNC: <0.2 MG/DL — SIGNIFICANT CHANGE UP (ref 0.2–1.2)
BUN SERPL-MCNC: 18 MG/DL — SIGNIFICANT CHANGE UP (ref 7–23)
CALCIUM SERPL-MCNC: 8.5 MG/DL — SIGNIFICANT CHANGE UP (ref 8.4–10.5)
CHLORIDE SERPL-SCNC: 102 MMOL/L — SIGNIFICANT CHANGE UP (ref 96–108)
CO2 SERPL-SCNC: 26 MMOL/L — SIGNIFICANT CHANGE UP (ref 22–31)
CREAT SERPL-MCNC: 0.88 MG/DL — SIGNIFICANT CHANGE UP (ref 0.5–1.3)
CULTURE RESULTS: SIGNIFICANT CHANGE UP
CULTURE RESULTS: SIGNIFICANT CHANGE UP
EGFR: 60 ML/MIN/1.73M2 — SIGNIFICANT CHANGE UP
EOSINOPHIL # BLD AUTO: 0.15 K/UL — SIGNIFICANT CHANGE UP (ref 0–0.5)
EOSINOPHIL NFR BLD AUTO: 2.4 % — SIGNIFICANT CHANGE UP (ref 0–6)
GLUCOSE SERPL-MCNC: 162 MG/DL — HIGH (ref 70–99)
HCT VFR BLD CALC: 29.8 % — LOW (ref 34.5–45)
HGB BLD-MCNC: 9.7 G/DL — LOW (ref 11.5–15.5)
IMM GRANULOCYTES NFR BLD AUTO: 0.8 % — SIGNIFICANT CHANGE UP (ref 0–0.9)
LYMPHOCYTES # BLD AUTO: 1.62 K/UL — SIGNIFICANT CHANGE UP (ref 1–3.3)
LYMPHOCYTES # BLD AUTO: 25.4 % — SIGNIFICANT CHANGE UP (ref 13–44)
MAGNESIUM SERPL-MCNC: 2 MG/DL — SIGNIFICANT CHANGE UP (ref 1.6–2.6)
MCHC RBC-ENTMCNC: 28.5 PG — SIGNIFICANT CHANGE UP (ref 27–34)
MCHC RBC-ENTMCNC: 32.6 GM/DL — SIGNIFICANT CHANGE UP (ref 32–36)
MCV RBC AUTO: 87.6 FL — SIGNIFICANT CHANGE UP (ref 80–100)
MONOCYTES # BLD AUTO: 0.51 K/UL — SIGNIFICANT CHANGE UP (ref 0–0.9)
MONOCYTES NFR BLD AUTO: 8 % — SIGNIFICANT CHANGE UP (ref 2–14)
NEUTROPHILS # BLD AUTO: 4 K/UL — SIGNIFICANT CHANGE UP (ref 1.8–7.4)
NEUTROPHILS NFR BLD AUTO: 62.8 % — SIGNIFICANT CHANGE UP (ref 43–77)
NRBC # BLD: 0 /100 WBCS — SIGNIFICANT CHANGE UP (ref 0–0)
PHOSPHATE SERPL-MCNC: 2.9 MG/DL — SIGNIFICANT CHANGE UP (ref 2.5–4.5)
PLATELET # BLD AUTO: 331 K/UL — SIGNIFICANT CHANGE UP (ref 150–400)
POTASSIUM SERPL-MCNC: 4.1 MMOL/L — SIGNIFICANT CHANGE UP (ref 3.5–5.3)
POTASSIUM SERPL-SCNC: 4.1 MMOL/L — SIGNIFICANT CHANGE UP (ref 3.5–5.3)
PROT SERPL-MCNC: 6.4 G/DL — SIGNIFICANT CHANGE UP (ref 6–8.3)
RBC # BLD: 3.4 M/UL — LOW (ref 3.8–5.2)
RBC # FLD: 16.6 % — HIGH (ref 10.3–14.5)
SODIUM SERPL-SCNC: 138 MMOL/L — SIGNIFICANT CHANGE UP (ref 135–145)
SPECIMEN SOURCE: SIGNIFICANT CHANGE UP
SPECIMEN SOURCE: SIGNIFICANT CHANGE UP
WBC # BLD: 6.37 K/UL — SIGNIFICANT CHANGE UP (ref 3.8–10.5)
WBC # FLD AUTO: 6.37 K/UL — SIGNIFICANT CHANGE UP (ref 3.8–10.5)

## 2022-10-24 PROCEDURE — 70450 CT HEAD/BRAIN W/O DYE: CPT | Mod: MG

## 2022-10-24 PROCEDURE — 97530 THERAPEUTIC ACTIVITIES: CPT

## 2022-10-24 PROCEDURE — 82746 ASSAY OF FOLIC ACID SERUM: CPT

## 2022-10-24 PROCEDURE — 80053 COMPREHEN METABOLIC PANEL: CPT

## 2022-10-24 PROCEDURE — 85379 FIBRIN DEGRADATION QUANT: CPT

## 2022-10-24 PROCEDURE — 87635 SARS-COV-2 COVID-19 AMP PRB: CPT

## 2022-10-24 PROCEDURE — 97163 PT EVAL HIGH COMPLEX 45 MIN: CPT

## 2022-10-24 PROCEDURE — 96365 THER/PROPH/DIAG IV INF INIT: CPT

## 2022-10-24 PROCEDURE — 83605 ASSAY OF LACTIC ACID: CPT

## 2022-10-24 PROCEDURE — 87086 URINE CULTURE/COLONY COUNT: CPT

## 2022-10-24 PROCEDURE — 80202 ASSAY OF VANCOMYCIN: CPT

## 2022-10-24 PROCEDURE — 84443 ASSAY THYROID STIM HORMONE: CPT

## 2022-10-24 PROCEDURE — 82607 VITAMIN B-12: CPT

## 2022-10-24 PROCEDURE — 36415 COLL VENOUS BLD VENIPUNCTURE: CPT

## 2022-10-24 PROCEDURE — 99285 EMERGENCY DEPT VISIT HI MDM: CPT

## 2022-10-24 PROCEDURE — 81001 URINALYSIS AUTO W/SCOPE: CPT

## 2022-10-24 PROCEDURE — 80048 BASIC METABOLIC PNL TOTAL CA: CPT

## 2022-10-24 PROCEDURE — 97161 PT EVAL LOW COMPLEX 20 MIN: CPT

## 2022-10-24 PROCEDURE — 71045 X-RAY EXAM CHEST 1 VIEW: CPT

## 2022-10-24 PROCEDURE — G1004: CPT

## 2022-10-24 PROCEDURE — 84100 ASSAY OF PHOSPHORUS: CPT

## 2022-10-24 PROCEDURE — 0225U NFCT DS DNA&RNA 21 SARSCOV2: CPT

## 2022-10-24 PROCEDURE — 76770 US EXAM ABDO BACK WALL COMP: CPT

## 2022-10-24 PROCEDURE — 85610 PROTHROMBIN TIME: CPT

## 2022-10-24 PROCEDURE — 85025 COMPLETE CBC W/AUTO DIFF WBC: CPT

## 2022-10-24 PROCEDURE — 93005 ELECTROCARDIOGRAM TRACING: CPT

## 2022-10-24 PROCEDURE — 87040 BLOOD CULTURE FOR BACTERIA: CPT

## 2022-10-24 PROCEDURE — 87186 SC STD MICRODIL/AGAR DIL: CPT

## 2022-10-24 PROCEDURE — 85730 THROMBOPLASTIN TIME PARTIAL: CPT

## 2022-10-24 PROCEDURE — 83735 ASSAY OF MAGNESIUM: CPT

## 2022-10-24 PROCEDURE — 85027 COMPLETE CBC AUTOMATED: CPT

## 2022-10-24 RX ADMIN — CEFTRIAXONE 100 MILLIGRAM(S): 500 INJECTION, POWDER, FOR SOLUTION INTRAMUSCULAR; INTRAVENOUS at 09:06

## 2022-10-24 RX ADMIN — Medication 1000 UNIT(S): at 14:57

## 2022-10-24 RX ADMIN — GALANTAMINE HYDROBROMIDE 12 MILLIGRAM(S): 4 TABLET, FILM COATED ORAL at 10:51

## 2022-10-24 RX ADMIN — MEMANTINE HYDROCHLORIDE 5 MILLIGRAM(S): 10 TABLET ORAL at 10:51

## 2022-10-24 RX ADMIN — Medication 81 MILLIGRAM(S): at 14:57

## 2022-10-24 RX ADMIN — MUPIROCIN 1 APPLICATION(S): 20 OINTMENT TOPICAL at 05:56

## 2022-10-24 NOTE — PROGRESS NOTE ADULT - SUBJECTIVE AND OBJECTIVE BOX
Pt seen and examined   no major change    REVIEW OF SYSTEMS:  unable      MEDICATIONS:  MEDICATIONS  (STANDING):  aspirin enteric coated 81 milliGRAM(s) Oral daily  atorvastatin 10 milliGRAM(s) Oral at bedtime  cefTRIAXone   IVPB 1000 milliGRAM(s) IV Intermittent every 24 hours  cholecalciferol 1000 Unit(s) Oral daily  galantamine 12 milliGRAM(s) Oral daily  galantamine 4 milliGRAM(s) Oral at bedtime  memantine 5 milliGRAM(s) Oral two times a day  mupirocin 2% Ointment 1 Application(s) Topical two times a day  pantoprazole    Tablet 40 milliGRAM(s) Oral before breakfast    MEDICATIONS  (PRN):  acetaminophen    Suspension .. 650 milliGRAM(s) Oral every 6 hours PRN Temp greater or equal to 38C (100.4F)  artificial  tears Solution 1 Drop(s) Both EYES daily PRN Dry Eyes  fluticasone propionate 50 MICROgram(s)/spray Nasal Spray 1 Spray(s) Both Nostrils two times a day PRN congestion  simethicone 80 milliGRAM(s) Chew daily PRN Indigestion      Allergies    doxycycline (Unknown)  [place] (Unknown)    Intolerances    hydrochlorothiazide (Other)      Vital Signs Last 24 Hrs  T(C): 36.6 (23 Oct 2022 20:50), Max: 36.8 (23 Oct 2022 12:47)  T(F): 97.9 (23 Oct 2022 20:50), Max: 98.3 (23 Oct 2022 12:47)  HR: 79 (23 Oct 2022 20:50) (79 - 80)  BP: 140/71 (23 Oct 2022 20:50) (112/68 - 140/71)  BP(mean): --  RR: 18 (23 Oct 2022 20:50) (18 - 18)  SpO2: 95% (23 Oct 2022 20:50) (94% - 95%)    Parameters below as of 23 Oct 2022 20:50  Patient On (Oxygen Delivery Method): room air        10-23 @ 07:01  -  10-24 @ 07:00  --------------------------------------------------------  IN: 0 mL / OUT: 100 mL / NET: -100 mL        PHYSICAL EXAM:    General:  in no acute distress  HEENT: MMM, conjunctiva and sclera clear  Lungs: poor inspiratory effort but clear  Heart: + murmru  Gastrointestinal: Soft non-tender non-distended; Normal bowel sounds;  Skin: Warm and dry. No obvious rash    LABS:      CBC Full  -  ( 23 Oct 2022 07:46 )  WBC Count : 6.61 K/uL  RBC Count : 3.17 M/uL  Hemoglobin : 8.9 g/dL  Hematocrit : 27.6 %  Platelet Count - Automated : 257 K/uL  Mean Cell Volume : 87.1 fl  Mean Cell Hemoglobin : 28.1 pg  Mean Cell Hemoglobin Concentration : 32.2 gm/dL  Auto Neutrophil # : x  Auto Lymphocyte # : x  Auto Monocyte # : x  Auto Eosinophil # : x  Auto Basophil # : x  Auto Neutrophil % : x  Auto Lymphocyte % : x  Auto Monocyte % : x  Auto Eosinophil % : x  Auto Basophil % : x    10-23    140  |  104  |  17  ----------------------------<  106<H>  4.2   |  28  |  1.01    Ca    8.3<L>      23 Oct 2022 07:46  Phos  3.3     10-23  Mg     1.9     10-23                        RADIOLOGY & ADDITIONAL STUDIES (The following images were personally reviewed):

## 2022-10-24 NOTE — OCCUPATIONAL THERAPY INITIAL EVALUATION ADULT - ADDITIONAL COMMENTS
Patient non-verbal and A&Ox0 therefore daughter in law and granddaughter at bedside and providing PLOF. Patient lives in a private home, where family is always present, and has a HHA 'as needed.' Noted multiple inconsistencies when obtaining PLOF. Patient attends OP PT and received home OT prior to the pandemic.

## 2022-10-24 NOTE — PROGRESS NOTE ADULT - TIME BILLING
Discussed with medical staff
Discussed with medical staff  family  and Dr Garcia
discussed with medical staff and Dr Garcia

## 2022-10-24 NOTE — OCCUPATIONAL THERAPY INITIAL EVALUATION ADULT - NSOTDMEREC_GEN_A_CORE
Family reports owning a wheelchair with seat belt, rollator, tub transfer bench, and raised toilet seat. Recommended a jose carlos lift and hospital bed and 24/7 care.

## 2022-10-24 NOTE — PROGRESS NOTE ADULT - PROBLEM SELECTOR PLAN 6
F: NI  E: replete as needed  N: regular diet  DVT ppx: SCDs  GI ppx: NI  Dispo: PT eval F: NI  E: replete as needed  N: regular diet  DVT ppx: SCDs  GI ppx: NI  Dispo: PT rajan, Seen by OT who recd home with continued 24/7 family assist and HHA.

## 2022-10-24 NOTE — OCCUPATIONAL THERAPY INITIAL EVALUATION ADULT - REHAB POTENTIAL, OT EVAL
none Patient to be discharged from skilled OT program as patient has no skilled OT needs/functional goals. Patient's family with difficulty understanding patient's current level of function as well as inability to participate in skilled OT program due to cognitive and functional impairments. Please re-consult OT if patient's status improves./none

## 2022-10-24 NOTE — PROGRESS NOTE ADULT - PROVIDER SPECIALTY LIST ADULT
Internal Medicine
Infectious Disease
Internal Medicine
Internal Medicine

## 2022-10-24 NOTE — PROGRESS NOTE ADULT - PROBLEM SELECTOR PROBLEM 5
Decubitus ulcer of sacral area

## 2022-10-24 NOTE — PROGRESS NOTE ADULT - PROBLEM SELECTOR PLAN 1
A total of 14 days of IV antibiotics dating from start at [facility]  In 14 days blood and urine cultures  Patient to followup with Dr Garcia regarding outpatient care  and followup labs

## 2022-10-24 NOTE — PROGRESS NOTE ADULT - SUBJECTIVE AND OBJECTIVE BOX
Pt seen and examined   no new changes  alert  eating when fed    REVIEW OF SYSTEMS:  unable    MEDICATIONS:  MEDICATIONS  (STANDING):  aspirin enteric coated 81 milliGRAM(s) Oral daily  atorvastatin 10 milliGRAM(s) Oral at bedtime  cefTRIAXone   IVPB 1000 milliGRAM(s) IV Intermittent every 24 hours  cholecalciferol 1000 Unit(s) Oral daily  galantamine 12 milliGRAM(s) Oral daily  galantamine 4 milliGRAM(s) Oral at bedtime  memantine 5 milliGRAM(s) Oral two times a day  mupirocin 2% Ointment 1 Application(s) Topical two times a day  pantoprazole    Tablet 40 milliGRAM(s) Oral before breakfast    MEDICATIONS  (PRN):  acetaminophen    Suspension .. 650 milliGRAM(s) Oral every 6 hours PRN Temp greater or equal to 38C (100.4F)  artificial  tears Solution 1 Drop(s) Both EYES daily PRN Dry Eyes  fluticasone propionate 50 MICROgram(s)/spray Nasal Spray 1 Spray(s) Both Nostrils two times a day PRN congestion  simethicone 80 milliGRAM(s) Chew daily PRN Indigestion      Allergies    doxycycline (Unknown)  [place] (Unknown)    Intolerances    hydrochlorothiazide (Other)      Vital Signs Last 24 Hrs  T(C): 37.2 (23 Oct 2022 05:28), Max: 37.2 (23 Oct 2022 05:28)  T(F): 99 (23 Oct 2022 05:28), Max: 99 (23 Oct 2022 05:28)  HR: 65 (23 Oct 2022 05:28) (64 - 73)  BP: 125/67 (23 Oct 2022 05:28) (118/64 - 142/92)  BP(mean): --  RR: 18 (23 Oct 2022 05:28) (17 - 18)  SpO2: 94% (23 Oct 2022 05:28) (93% - 97%)    Parameters below as of 23 Oct 2022 05:28  Patient On (Oxygen Delivery Method): room air        10-22 @ 07:01  -  10-23 @ 07:00  --------------------------------------------------------  IN: 0 mL / OUT: 600 mL / NET: -600 mL        PHYSICAL EXAM:    General: ; in no acute distress  HEENT: MMM, conjunctiva and sclera clear  Lungs: clear  heart: regular  Gastrointestinal: Soft non-tender non-distended; Normal bowel sounds;   Skin: pressure sore    LABS:      CBC Full  -  ( 23 Oct 2022 07:46 )  WBC Count : 6.61 K/uL  RBC Count : 3.17 M/uL  Hemoglobin : 8.9 g/dL  Hematocrit : 27.6 %  Platelet Count - Automated : 257 K/uL  Mean Cell Volume : 87.1 fl  Mean Cell Hemoglobin : 28.1 pg  Mean Cell Hemoglobin Concentration : 32.2 gm/dL  Auto Neutrophil # : x  Auto Lymphocyte # : x  Auto Monocyte # : x  Auto Eosinophil # : x  Auto Basophil # : x  Auto Neutrophil % : x  Auto Lymphocyte % : x  Auto Monocyte % : x  Auto Eosinophil % : x  Auto Basophil % : x    10-23    140  |  104  |  17  ----------------------------<  106<H>  4.2   |  28  |  1.01    Ca    8.3<L>      23 Oct 2022 07:46  Phos  3.3     10-23  Mg     1.9     10-23    TPro  5.8<L>  /  Alb  2.5<L>  /  TBili  0.2  /  DBili  x   /  AST  15  /  ALT  16  /  AlkPhos  131<H>  10-22                      RADIOLOGY & ADDITIONAL STUDIES (The following images were personally reviewed): 24H: Patients family refused PICC/midline due to fear of development of infection or clotting - family would prefer to be set up with infusion center        T(C): 36.8 (10-24-22 @ 11:20), Max: 36.8 (10-24-22 @ 11:20)  HR: 65 (10-24-22 @ 11:20) (65 - 79)  BP: 128/67 (10-24-22 @ 11:20) (128/67 - 140/71)  RR: 18 (10-24-22 @ 11:20) (18 - 18)  SpO2: 95% (10-24-22 @ 11:20) (95% - 95%)  Wt(kg): --Vital Signs Last 24 Hrs  T(C): 36.8 (24 Oct 2022 11:20), Max: 36.8 (24 Oct 2022 11:20)  T(F): 98.3 (24 Oct 2022 11:20), Max: 98.3 (24 Oct 2022 11:20)  HR: 65 (24 Oct 2022 11:20) (65 - 79)  BP: 128/67 (24 Oct 2022 11:20) (128/67 - 140/71)  BP(mean): --  RR: 18 (24 Oct 2022 11:20) (18 - 18)  SpO2: 95% (24 Oct 2022 11:20) (95% - 95%)    Parameters below as of 24 Oct 2022 11:20  Patient On (Oxygen Delivery Method): room air      LABS:                        9.7    6.37  )-----------( 331      ( 24 Oct 2022 11:53 )             29.8     10-24    138  |  102  |  18  ----------------------------<  162<H>  4.1   |  26  |  0.88    Ca    8.5      24 Oct 2022 11:53  Phos  2.9     10-24  Mg     2.0     10-24    TPro  6.4  /  Alb  2.7<L>  /  TBili  <0.2  /  DBili  x   /  AST  15  /  ALT  13  /  AlkPhos  129<H>  10-24          CAPILLARY BLOOD GLUCOSE                RADIOLOGY & ADDITIONAL TESTS:    Imaging Personally Reviewed:  [ ] YES  [ ] NO

## 2022-10-24 NOTE — PROGRESS NOTE ADULT - SUBJECTIVE AND OBJECTIVE BOX
INTERVAL HPI/OVERNIGHT EVENTS:    ANTIBIOTICS    MEDICATIONS  (STANDING):  aspirin enteric coated 81 milliGRAM(s) Oral daily  atorvastatin 10 milliGRAM(s) Oral at bedtime  cefTRIAXone   IVPB 1000 milliGRAM(s) IV Intermittent every 24 hours  cholecalciferol 1000 Unit(s) Oral daily  galantamine 12 milliGRAM(s) Oral daily  galantamine 4 milliGRAM(s) Oral at bedtime  memantine 5 milliGRAM(s) Oral two times a day  mupirocin 2% Ointment 1 Application(s) Topical two times a day  pantoprazole    Tablet 40 milliGRAM(s) Oral before breakfast    MEDICATIONS  (PRN):  acetaminophen    Suspension .. 650 milliGRAM(s) Oral every 6 hours PRN Temp greater or equal to 38C (100.4F)  artificial  tears Solution 1 Drop(s) Both EYES daily PRN Dry Eyes  fluticasone propionate 50 MICROgram(s)/spray Nasal Spray 1 Spray(s) Both Nostrils two times a day PRN congestion  simethicone 80 milliGRAM(s) Chew daily PRN Indigestion      Allergies    doxycycline (Unknown)  [place] (Unknown)    Intolerances    hydrochlorothiazide (Other)      REVIEW OF SYSTEMS:    uncommunicative      Vital Signs Last 24 Hrs  T(C): 36.6 (23 Oct 2022 20:50), Max: 36.8 (23 Oct 2022 12:47)  T(F): 97.9 (23 Oct 2022 20:50), Max: 98.3 (23 Oct 2022 12:47)  HR: 79 (23 Oct 2022 20:50) (79 - 80)  BP: 140/71 (23 Oct 2022 20:50) (112/68 - 140/71)  BP(mean): --  RR: 18 (23 Oct 2022 20:50) (18 - 18)  SpO2: 95% (23 Oct 2022 20:50) (94% - 95%)    Parameters below as of 23 Oct 2022 20:50  Patient On (Oxygen Delivery Method): room air        PHYSICAL EXAM:    General:  well nourished; in no acute distress  Eyes: PERRL, EOM intact; conjunctiva and sclera clear  Head: Normocephalic; atraumatic  ENMT: No nasal discharge; airway clear  Neck: Supple; non tender; no masses  Respiratory: No wheezes, rales or rhonchi  Cardiovascular: Regular rate and rhythm. S1 and S2 Normal; No murmurs, gallops or rubs  Gastrointestinal: Soft non-tender non-distended; Normal bowel sounds; No hepatosplenomegaly  Genitourinary: No costovertebral angle tenderness  Extremities: Normal range of motion, No clubbing, cyanosis or edema    LABS:                        8.9    6.61  )-----------( 257      ( 23 Oct 2022 07:46 )             27.6     10-23    140  |  104  |  17  ----------------------------<  106<H>  4.2   |  28  |  1.01    Ca    8.3<L>      23 Oct 2022 07:46  Phos  3.3     10-23  Mg     1.9     10-23              MICROBIOLOGY:  Culture Results:   No growth at 3 days. (10-21 @ 07:25)  Culture Results:   No growth at 3 days. (10-21 @ 07:23)  Culture Results:   No growth (10-21 @ 01:59)  Culture Results:   No growth at 5 days. (10-19 @ 04:03)  Culture Results:   No growth at 5 days. (10-19 @ 04:03)  Culture Results:   >100,000 CFU/ml Escherichia coli  Normal Urogenital ashok present (10-19 @ 00:32)      RADIOLOGY & ADDITIONAL STUDIES:

## 2022-10-24 NOTE — PROGRESS NOTE ADULT - PROBLEM SELECTOR PLAN 1
Plan:   - Started on CTX 1g x 10days (10/23) will finish course on 11/1   - PICC planned for today Plan:   - Started on CTX 1g x 10days (10/23) will finish course on 11/1   - Patients family refused PICC/Midline, would prefer to be set up with infusion center instead Plan:   - Started on CTX 1g x 10days (10/23) will finish course on 11/1   - Patients family refused PICC/Midline, would prefer to be set up with infusion center instead  - Per ID, patient to followup with Dr Garcia regarding outpatient care and followup labs.

## 2022-10-24 NOTE — PROGRESS NOTE ADULT - ASSESSMENT
await PICC  d/c home if PICC and home antibiotics available  wound care per family as per their [email or web address]   await PICC  d/c home if PICC and home antibiotics available  wound care per family as per their request

## 2022-10-24 NOTE — PROGRESS NOTE ADULT - REASON FOR ADMISSION
Delirium

## 2022-10-24 NOTE — OCCUPATIONAL THERAPY INITIAL EVALUATION ADULT - LEVEL OF CONSCIOUSNESS, OT EVAL
obtunded Pt's granddaughter is able to arouse pt with vigorous tickling of abdomen and vigorous PROM of extremities/confused/obtunded

## 2022-10-24 NOTE — DISCHARGE NOTE NURSING/CASE MANAGEMENT/SOCIAL WORK - PATIENT PORTAL LINK FT
You can access the FollowMyHealth Patient Portal offered by [facility] by registering at the following website: [email or web address]. By joining [facility]’s FollowMyHealth portal, you will also be able to view your health information using other applications (apps) compatible with our system.

## 2022-10-24 NOTE — OCCUPATIONAL THERAPY INITIAL EVALUATION ADULT - GENERAL OBSERVATIONS, REHAB EVAL
Patient received semi-supine in bed (asleep and difficult to arouse), +primafit, on RA, +heplock, +granddaughter Lolis present, +daughter-in-law (declining to provide name) at bedside. PT Angelique present, RN Indira cleared patient for OT session.

## 2022-10-24 NOTE — OCCUPATIONAL THERAPY INITIAL EVALUATION ADULT - DIAGNOSIS, OT EVAL
Patient to be discharged from skilled OT program as patient has no skilled OT needs/functional goals. Patient's family with difficulty understanding patient's current level of function as well as inability to participate in skilled OT program due to cognitive and functional impairments.

## 2022-10-24 NOTE — OCCUPATIONAL THERAPY INITIAL EVALUATION ADULT - NAME OF CLINICIAN
LYUDMILA Jacobson, LYUDMILA Cisneros, MD [place] LYUDMILA Jacobson, LYUDMILA Cisneros, MD [place]

## 2022-10-24 NOTE — OCCUPATIONAL THERAPY INITIAL EVALUATION ADULT - LEVEL OF INDEPENDENCE: EATING, OT EVAL
dependently placed water bottle with straw in patient's hand, patient with no initiation to drink, when placed straw in patient's mouth patient with >30second delay prior to taking a sip of water/unable to perform

## 2022-10-24 NOTE — PROGRESS NOTE ADULT - PROBLEM SELECTOR PLAN 4
Home meds lipitor 10  C/w lipitor

## 2022-10-24 NOTE — OCCUPATIONAL THERAPY INITIAL EVALUATION ADULT - TOILETING, PREVIOUS LEVEL OF FUNCTION, OT EVAL
raised toilet seat with grab bars/needs device and assist raised toilet seat with grab bars. per family they bring patient to the toilet every hour d/t incontinence/needs device and assist

## 2022-10-24 NOTE — OCCUPATIONAL THERAPY INITIAL EVALUATION ADULT - RANGE OF MOTION EXAMINATION
rests in cervical flexion unable to achieve midline; BUE with flexion contracture; no AROM noted at BUE and/or BLEs . B/L shoulders able to achieve PROM from 0-50 degrees, b/l elbows contracted at 90 degrees of flex, b/l gross grasp contracted into flexion. B/L hip adduction contractures./deficits as listed below

## 2022-10-24 NOTE — OCCUPATIONAL THERAPY INITIAL EVALUATION ADULT - FINE MOTOR COORDINATION, FINE MOTOR COORDINATION TESTS, OT EVAL
Per patient's family patient enjoys writing, provided patient with pen and paper. Patient with no initiation to reach for pen.

## 2022-10-24 NOTE — OCCUPATIONAL THERAPY INITIAL EVALUATION ADULT - GROOMING, PREVIOUS LEVEL OF FUNCTION, OT EVAL
When initially asked family reporting patient engages in grooming tasks, however later in session family reporting patient will not initate or participate in tasks such as combing hair and brushing teeth.

## 2022-10-25 PROBLEM — N39.0 URINARY TRACT INFECTION, SITE NOT SPECIFIED: Chronic | Status: ACTIVE | Noted: 2018-01-16

## 2022-10-25 PROBLEM — I48.91 UNSPECIFIED ATRIAL FIBRILLATION: Chronic | Status: ACTIVE | Noted: 2018-01-16

## 2022-10-25 RX ORDER — CEFTRIAXONE 500 MG/1
1000 INJECTION, POWDER, FOR SOLUTION INTRAMUSCULAR; INTRAVENOUS EVERY 24 HOURS
Refills: 0 | Status: DISCONTINUED | OUTPATIENT
Start: 2022-10-26 | End: 2022-11-10

## 2022-10-25 RX ORDER — CEFTRIAXONE 500 MG/1
1000 INJECTION, POWDER, FOR SOLUTION INTRAMUSCULAR; INTRAVENOUS EVERY 24 HOURS
Refills: 0 | Status: DISCONTINUED | OUTPATIENT
Start: 2022-10-28 | End: 2022-11-12

## 2022-10-25 RX ORDER — CEFTRIAXONE 500 MG/1
1 INJECTION, POWDER, FOR SOLUTION INTRAMUSCULAR; INTRAVENOUS
Qty: 8 | Refills: 0
Start: 2022-10-25 | End: 2022-11-01

## 2022-10-26 ENCOUNTER — APPOINTMENT (OUTPATIENT)
Dept: INFUSION THERAPY | Facility: CLINIC | Age: 87
End: 2022-10-26

## 2022-10-26 ENCOUNTER — OUTPATIENT (OUTPATIENT)
Dept: OUTPATIENT SERVICES | Facility: HOSPITAL | Age: 87
LOS: 1 days | End: 2022-10-26
Payer: MEDICARE

## 2022-10-26 VITALS
SYSTOLIC BLOOD PRESSURE: 153 MMHG | RESPIRATION RATE: 17 BRPM | DIASTOLIC BLOOD PRESSURE: 93 MMHG | HEIGHT: 63 IN | HEART RATE: 70 BPM | TEMPERATURE: 98 F | OXYGEN SATURATION: 98 %

## 2022-10-26 DIAGNOSIS — R78.81 BACTEREMIA: ICD-10-CM

## 2022-10-26 LAB
CULTURE RESULTS: SIGNIFICANT CHANGE UP
CULTURE RESULTS: SIGNIFICANT CHANGE UP
SPECIMEN SOURCE: SIGNIFICANT CHANGE UP
SPECIMEN SOURCE: SIGNIFICANT CHANGE UP

## 2022-10-26 PROCEDURE — 96365 THER/PROPH/DIAG IV INF INIT: CPT

## 2022-10-26 RX ADMIN — CEFTRIAXONE 1000 MILLIGRAM(S): 500 INJECTION, POWDER, FOR SOLUTION INTRAMUSCULAR; INTRAVENOUS at 15:15

## 2022-10-26 RX ADMIN — CEFTRIAXONE 100 MILLIGRAM(S): 500 INJECTION, POWDER, FOR SOLUTION INTRAMUSCULAR; INTRAVENOUS at 14:50

## 2022-10-27 ENCOUNTER — APPOINTMENT (OUTPATIENT)
Dept: INFUSION THERAPY | Facility: CLINIC | Age: 87
End: 2022-10-27

## 2022-10-27 ENCOUNTER — OUTPATIENT (OUTPATIENT)
Dept: OUTPATIENT SERVICES | Facility: HOSPITAL | Age: 87
LOS: 1 days | End: 2022-10-27
Payer: MEDICARE

## 2022-10-27 VITALS
SYSTOLIC BLOOD PRESSURE: 161 MMHG | OXYGEN SATURATION: 95 % | TEMPERATURE: 97 F | DIASTOLIC BLOOD PRESSURE: 83 MMHG | RESPIRATION RATE: 18 BRPM | HEART RATE: 71 BPM

## 2022-10-27 DIAGNOSIS — R78.81 BACTEREMIA: ICD-10-CM

## 2022-10-27 PROCEDURE — 96365 THER/PROPH/DIAG IV INF INIT: CPT

## 2022-10-28 ENCOUNTER — OUTPATIENT (OUTPATIENT)
Dept: OUTPATIENT SERVICES | Facility: HOSPITAL | Age: 87
LOS: 1 days | End: 2022-10-28
Payer: MEDICARE

## 2022-10-28 ENCOUNTER — APPOINTMENT (OUTPATIENT)
Dept: INFUSION THERAPY | Facility: CLINIC | Age: 87
End: 2022-10-28

## 2022-10-28 VITALS
OXYGEN SATURATION: 99 % | TEMPERATURE: 97 F | DIASTOLIC BLOOD PRESSURE: 90 MMHG | RESPIRATION RATE: 18 BRPM | HEART RATE: 78 BPM | SYSTOLIC BLOOD PRESSURE: 170 MMHG

## 2022-10-28 DIAGNOSIS — R78.81 BACTEREMIA: ICD-10-CM

## 2022-10-28 PROCEDURE — 96365 THER/PROPH/DIAG IV INF INIT: CPT

## 2022-10-28 RX ADMIN — CEFTRIAXONE 100 MILLIGRAM(S): 500 INJECTION, POWDER, FOR SOLUTION INTRAMUSCULAR; INTRAVENOUS at 13:26

## 2022-10-28 RX ADMIN — CEFTRIAXONE 1000 MILLIGRAM(S): 500 INJECTION, POWDER, FOR SOLUTION INTRAMUSCULAR; INTRAVENOUS at 14:10

## 2022-10-31 ENCOUNTER — APPOINTMENT (OUTPATIENT)
Dept: INFUSION THERAPY | Facility: CLINIC | Age: 87
End: 2022-10-31

## 2022-11-01 ENCOUNTER — APPOINTMENT (OUTPATIENT)
Dept: INFUSION THERAPY | Facility: CLINIC | Age: 87
End: 2022-11-01

## 2022-11-02 ENCOUNTER — TRANSCRIPTION ENCOUNTER (OUTPATIENT)
Age: 87
End: 2022-11-02

## 2022-11-30 NOTE — ED PROVIDER NOTE - MUSCULOSKELETAL, MLM
Discontinue calcium supplementation   Repeat calcium level in 4 weeks, nonfasting   Start furosemide 20 mg 1 daily for leg swelling  Return in 2-3 weeks if still symptoms   Discontinue alprazolam for anxiety   Use hydroxyzine 10 mg tablet 1 daily as needed for anxiety    Do not drive or operate machinery until side effect is known will cause drowsiness   Follow all specialist per their instructions  Return in 6 months for office visit, blood work, and a Pamella Spine appears normal, range of motion is not limited, no muscle or joint tenderness

## 2022-12-09 NOTE — PATIENT PROFILE ADULT. - AS SC BRADEN ACTIVITY
well developed, well nourished , in no acute distress , ambulating without difficulty , normal communication ability (3) walks occasionally

## 2023-03-24 NOTE — PROGRESS NOTE ADULT - PROBLEM SELECTOR PROBLEM 1
UTI (urinary tract infection)
Alert and oriented to person, place and time

## 2023-04-10 NOTE — PATIENT PROFILE ADULT. - PRESSURE ULCER(S)
[facility] Contact  [facility]/Voicemail     Clinical Data: Transitional Care Management Outreach     Outreach attempted x 2.  On 04/08/2023 left message on patient's voicemail, providing [facility]'s 24/7 scheduling and nurse triage phone number [phone] ([phone]) for questions/concerns and/or to schedule an appt with an [facility] provider, if they do not have a PCP. Today, on outreach attempt #2, a woman answered and stated pt was not home. She did not know when she would be back. Left very brief message regarding reason for call, stating to call her primary care provider if she has any questions.      Plan:  [facility] will do no further outreaches at this time.       Katie Quintana  Community Health Worker  [facility] [place], [facility]      *Connected Care Resource Team does NOT follow patient ongoing. Referrals are identified based on internal discharge reports and the outreach is to ensure patient has an understanding of their discharge instructions.   no

## 2023-09-14 NOTE — CONSULT NOTE ADULT - SUBJECTIVE AND OBJECTIVE BOX
HPI:  PCP: Dr Meyer  - - - - -  HPI: This is a 89F dementia (AAOx1, can communicate if pain, follows selective simple commands, no complex commands), pAfib (not on AC), cerebral artery occlusion, ?chf (EF unknown), HTN, HLD, CKD, recently hospitalized for urosepsis c/b sacral decub (early 2022), subsequently tx for recurrent uti s/p bactrim (22), referred in by Dr meyer's office for <24h delirium. Patient is presenting from home and was noted by family to be lethargic for one day. She was seen at [facility] earlier today, found to have UTI on UA with Urine Cx pending. Recieved CTX and 750cc IVF.  Per PCP/wound care, good healing of sacral decub w/o cellulitis. No reports of fever/chills, no uri/cough, no cp/sob, no abd pain/n/v, no diarrhea, no dysuria, no rash, no trauma.    In the ED:  Initial vital signs: T: 100.1F, HR: 78, BP: 129/79, R: 17, SpO2: 96% on RA  ED course:   Labs: Hgb 9, UA +LE, WBC and blood, COVID negative  Imaging: CTH global atrophy and progressive volume loss  CXR: No consolidations, effusions appreciated  EKG: NSR, LAD, no ST changes  Medications: Tylenol  Consults: none    (19 Oct 2022 01:12)      PAST MEDICAL & SURGICAL HISTORY:  Memory loss  Memory loss      Fall  Falls      Cerebral artery occlusion with cerebral infarction  CVA (cerebral infarction)      Hyperlipidemia  HLD (hyperlipidemia)      Essential hypertension  HTN (hypertension)      UTI (urinary tract infection)      Sinusitis      Atrial fibrillation  No AC.  Pt. currently SR      AS (aortic stenosis)      No significant past surgical history          REVIEW OF SYSTEMS:    patient uncommunicative    MEDICATIONS  (STANDING):  atorvastatin 10 milliGRAM(s) Oral at bedtime  cefTRIAXone   IVPB 1000 milliGRAM(s) IV Intermittent every 24 hours  memantine 5 milliGRAM(s) Oral at bedtime  mupirocin 2% Ointment 1 Application(s) Topical two times a day  pantoprazole    Tablet 40 milliGRAM(s) Oral before breakfast    MEDICATIONS  (PRN):  acetaminophen     Tablet .. 650 milliGRAM(s) Oral every 6 hours PRN Temp greater or equal to 38C (100.4F), Mild Pain (1 - 3)      cefTRIAXone   IVPB 1000 milliGRAM(s) IV Intermittent every 24 hours      Allergies    doxycycline (Unknown)  [place] (Unknown)    Intolerances    hydrochlorothiazide (Other)      SOCIAL HISTORY:    FAMILY HISTORY:      Vital Signs Last 24 Hrs  T(C): 36.5 (19 Oct 2022 10:35), Max: 37.9 (19 Oct 2022 06:32)  T(F): 97.7 (19 Oct 2022 10:35), Max: 100.3 (19 Oct 2022 06:32)  HR: 60 (19 Oct 2022 12:31) (60 - 78)  BP: 109/65 (19 Oct 2022 12:31) (98/57 - 155/86)  BP(mean): 95 (19 Oct 2022 02:56) (95 - 99)  RR: 18 (19 Oct 2022 12:31) (16 - 18)  SpO2: 96% (19 Oct 2022 12:31) (94% - 96%)    Parameters below as of 19 Oct 2022 12:31  Patient On (Oxygen Delivery Method): room air        PHYSICAL EXAM:    General: W in no acute distress  Eyes: PERRL, EOM intact; conjunctiva and sclera clear  Head: Normocephalic; atraumatic  ENMT: No nasal discharge; airway clear  Neck: Supple; non tender; no masses  Respiratory: No wheezes, rales or rhonchi  Cardiovascular: Regular rate and rhythm. S1 and S2 Normal; No murmurs, gallops or rubs  Gastrointestinal: Soft non-tender non-distended; Normal bowel sounds; No hepatosplenomegaly  Genitourinary: No costovertebral angle tenderness  Extremities: Normal range of motion, No clubbing, cyanosis or edema    sacral decubitus   it does not appear infected  no pus no erythema  Vascular: Peripheral pulses palpable 2+ bilaterally  Neurological: Alert and oriented x3  Skin: Warm and dry. No acute rash  Lymph Nodes: No acute cervical adenopathy  Musculoskeletal: Normal gait, tone, without deformities    LABS:                        9.2    6.85  )-----------( 198      ( 19 Oct 2022 07:16 )             28.2     10-19    138  |  100  |  27<H>  ----------------------------<  99  3.6   |  28  |  0.95    Ca    8.8      19 Oct 2022 07:16  Phos  2.9     10-  Mg     1.8     10-    TPro  6.3  /  Alb  3.2<L>  /  TBili  0.4  /  DBili  x   /  AST  30  /  ALT  26  /  AlkPhos  175<H>  10-    PT/INR - ( 18 Oct 2022 22:20 )   PT: 13.2 sec;   INR: 1.11          PTT - ( 18 Oct 2022 22:20 )  PTT:30.0 sec  Urinalysis Basic - ( 19 Oct 2022 00:32 )    Color: Yellow / Appearance: SL Cloudy / S.020 / pH: x  Gluc: x / Ketone: NEGATIVE  / Bili: Negative / Urobili: 0.2 E.U./dL   Blood: x / Protein: 30 mg/dL / Nitrite: NEGATIVE   Leuk Esterase: Large / RBC: < 5 /HPF / WBC Many /HPF   Sq Epi: x / Non Sq Epi: 0-5 /HPF / Bacteria: Many /HPF        RADIOLOGY & ADDITIONAL STUDIES:  
negative -  no rash
No

## 2023-09-28 NOTE — ED PROVIDER NOTE - CROS ED GI ALL NEG
Writer spoke to patient in clinic for 2 week post procedure check-in.    · Numbness, tingling or color change in the limb used for puncture site - pt denies  · Increasing pain and firmness near the puncture site? no  · Redness or drainage around the puncture site? no  · A temperature greater than 101 degrees? no  · Elevated blood pressures? no  · Verified antiplatelet/anticoagulation regimen (if applicable)?  · Any other concerns? n/a      Reviewed FAST education with the patient and informed to call 911 if experiencing any of the symptoms below:    · B: (Balance) Loss of balance, dizziness, or lightheadedness  · E: (Eyes) loss of vision, blurry vision, double vision, change in vision  · F: (Face) Facial droop, typically one-sided   · A: (Arm) Arm or leg weakness, numbness, or tingling, typically on one side of the body  · S: (Speech) Speech difficulties: trouble speaking, understanding others, confusion, or slurred speech  · T: (Terrible headache) Terrible headache that comes on suddenly, often described as the worse headache of your life               negative...

## 2024-03-19 NOTE — PHYSICAL THERAPY INITIAL EVALUATION ADULT - DISCHARGE PLANNER MADE AWARE
Left a message for Paulette to take B12 supplement 500 to 1000 mcg/day and oral iron every other day, she can purchase a lower dose over-the-counter.  [employer] also makes iron Gummies with vitamin C.    yes

## 2025-03-26 NOTE — CONSULT NOTE ADULT - CONSULT REASON
Spoke with the patient she states that there was an error with the script at the pharmacy and its ok the Hydroxizine is covered and good to go.        Shingle pain is progressively getting a little bit less and the hydroxizine is helping her sleep she slept 6 hours last night   Syncope